# Patient Record
Sex: FEMALE | Race: WHITE | NOT HISPANIC OR LATINO | Employment: OTHER | URBAN - METROPOLITAN AREA
[De-identification: names, ages, dates, MRNs, and addresses within clinical notes are randomized per-mention and may not be internally consistent; named-entity substitution may affect disease eponyms.]

---

## 2023-04-25 ENCOUNTER — CLINICAL SUPPORT (OUTPATIENT)
Dept: PRIMARY CARE | Facility: CLINIC | Age: 78
End: 2023-04-25
Payer: MEDICARE

## 2023-04-25 DIAGNOSIS — D51.9 ANEMIA DUE TO VITAMIN B12 DEFICIENCY, UNSPECIFIED B12 DEFICIENCY TYPE: ICD-10-CM

## 2023-04-25 PROCEDURE — 96372 THER/PROPH/DIAG INJ SC/IM: CPT | Performed by: INTERNAL MEDICINE

## 2023-04-25 RX ORDER — CYANOCOBALAMIN 1000 UG/ML
1000 INJECTION, SOLUTION INTRAMUSCULAR; SUBCUTANEOUS ONCE
Status: COMPLETED | OUTPATIENT
Start: 2023-04-25 | End: 2023-04-25

## 2023-04-25 RX ORDER — CYANOCOBALAMIN 1000 UG/ML
1000 INJECTION, SOLUTION INTRAMUSCULAR; SUBCUTANEOUS ONCE
Status: DISCONTINUED | OUTPATIENT
Start: 2023-04-25 | End: 2023-04-25

## 2023-04-25 RX ADMIN — CYANOCOBALAMIN 1000 MCG: 1000 INJECTION, SOLUTION INTRAMUSCULAR; SUBCUTANEOUS at 13:59

## 2023-05-12 DIAGNOSIS — Z00.00 HEALTH CARE MAINTENANCE: ICD-10-CM

## 2023-05-14 DIAGNOSIS — M81.0 OSTEOPOROSIS, UNSPECIFIED OSTEOPOROSIS TYPE, UNSPECIFIED PATHOLOGICAL FRACTURE PRESENCE: Primary | ICD-10-CM

## 2023-05-19 LAB — CALCIUM (MG/DL) IN SER/PLAS: 9.9 MG/DL (ref 8.6–10.6)

## 2023-05-23 DIAGNOSIS — M81.0 OSTEOPOROSIS, UNSPECIFIED OSTEOPOROSIS TYPE, UNSPECIFIED PATHOLOGICAL FRACTURE PRESENCE: ICD-10-CM

## 2023-05-26 DIAGNOSIS — Z00.00 HEALTH CARE MAINTENANCE: Primary | ICD-10-CM

## 2023-05-26 RX ORDER — DENOSUMAB 60 MG/ML
60 INJECTION SUBCUTANEOUS ONCE
Qty: 1 ML | Refills: 0 | Status: SHIPPED | OUTPATIENT
Start: 2023-05-26 | End: 2023-05-26 | Stop reason: SDUPTHER

## 2023-05-26 RX ORDER — DENOSUMAB 60 MG/ML
60 INJECTION SUBCUTANEOUS ONCE
Qty: 1 ML | Refills: 0 | Status: SHIPPED | OUTPATIENT
Start: 2023-05-26 | End: 2023-11-20 | Stop reason: SDUPTHER

## 2023-06-23 ENCOUNTER — OFFICE VISIT (OUTPATIENT)
Dept: PRIMARY CARE | Facility: CLINIC | Age: 78
End: 2023-06-23
Payer: MEDICARE

## 2023-06-23 VITALS — RESPIRATION RATE: 12 BRPM | HEART RATE: 88 BPM

## 2023-06-23 DIAGNOSIS — M79.89 LEG SWELLING: ICD-10-CM

## 2023-06-23 DIAGNOSIS — M23.304 DEGENERATION DISEASE OF MEDIAL MENISCUS OF LEFT KNEE: Primary | ICD-10-CM

## 2023-06-23 DIAGNOSIS — M17.12 PRIMARY OSTEOARTHRITIS OF LEFT KNEE: ICD-10-CM

## 2023-06-23 DIAGNOSIS — Z00.00 HEALTH CARE MAINTENANCE: ICD-10-CM

## 2023-06-23 PROCEDURE — 99213 OFFICE O/P EST LOW 20 MIN: CPT | Performed by: INTERNAL MEDICINE

## 2023-06-23 PROCEDURE — 1159F MED LIST DOCD IN RCRD: CPT | Performed by: INTERNAL MEDICINE

## 2023-06-23 RX ORDER — PREDNISONE 5 MG/1
5 TABLET ORAL 2 TIMES DAILY
Qty: 16 TABLET | Refills: 0 | Status: SHIPPED | OUTPATIENT
Start: 2023-06-23 | End: 2023-07-01

## 2023-06-23 NOTE — PROGRESS NOTES
Subjective   Patient ID: Tricia Cottrell is a 77 y.o. female who presents for No chief complaint on file..    HPI follow-up visit no staff no chest pain no shortness of breath no side effect with medication had tried only a few days of the Medrol Dosepak she had seen osteoarthritis spine physician after her knee was reexamined at orthopedics recall she had had a plain film ultrasound and MRI for some swelling of the left lateral lower leg although the films had shown medial pathology and a Bakers cyst she could walk up to 3 weeks at a time before she began to have some achiness there    Review of Systems    Objective   There were no vitals taken for this visit.    Physical Exam vital signs noted alert and oriented x3 NCAT no JVD chest clear to auscultation and percussion CV regular rate and rhythm S1-S2 without murmur gallop or rub extremities no clubbing cyanosis or edema on the right on the left there is some lateral calf swelling approximately 1 cm larger than the right musculoskeletal knee crepitus medially some joint effusion including posteriorly ankle more T's normal there is no swelling below the lateral and medial malleolus    Assessment/Plan impression left knee DJD medial meniscus and leg swelling  Plan we will begin low-dose prednisone 5 mg 1 p.o. twice daily with food #16 refill 0 and follow-up with traditional orthopedic if no better films were reviewed with her floresita exercise okay range of motion exercise topical liniment cream Tylenol to supplement continue with blood pressure management and medication and recheck as above and with Ortho and for regular visit

## 2023-07-24 ENCOUNTER — CLINICAL SUPPORT (OUTPATIENT)
Dept: PRIMARY CARE | Facility: CLINIC | Age: 78
End: 2023-07-24
Payer: MEDICARE

## 2023-07-24 DIAGNOSIS — E53.8 B12 DEFICIENCY: ICD-10-CM

## 2023-07-24 DIAGNOSIS — E53.8 B12 DEFICIENCY: Primary | ICD-10-CM

## 2023-07-24 PROCEDURE — 96372 THER/PROPH/DIAG INJ SC/IM: CPT | Performed by: INTERNAL MEDICINE

## 2023-07-24 RX ORDER — CYANOCOBALAMIN 1000 UG/ML
1000 INJECTION, SOLUTION INTRAMUSCULAR; SUBCUTANEOUS ONCE
Status: COMPLETED | OUTPATIENT
Start: 2023-07-24 | End: 2023-07-24

## 2023-07-24 RX ADMIN — CYANOCOBALAMIN 1000 MCG: 1000 INJECTION, SOLUTION INTRAMUSCULAR; SUBCUTANEOUS at 13:17

## 2023-07-24 NOTE — PROGRESS NOTES
Patient was in for a B 12 injection only. Patient brought in medication     B12 1000 mcg given     Left deltoid     NDC [id number]   Lot [id number]  Exp 01/2025      Patricia Urena

## 2023-07-25 ENCOUNTER — APPOINTMENT (OUTPATIENT)
Dept: PRIMARY CARE | Facility: CLINIC | Age: 78
End: 2023-07-25
Payer: MEDICARE

## 2023-09-19 ENCOUNTER — TELEPHONE (OUTPATIENT)
Dept: PRIMARY CARE | Facility: CLINIC | Age: 78
End: 2023-09-19
Payer: MEDICARE

## 2023-09-22 DIAGNOSIS — D51.9 ANEMIA DUE TO VITAMIN B12 DEFICIENCY, UNSPECIFIED B12 DEFICIENCY TYPE: Primary | ICD-10-CM

## 2023-09-22 RX ORDER — CYANOCOBALAMIN 1000 UG/ML
INJECTION, SOLUTION INTRAMUSCULAR; SUBCUTANEOUS
Qty: 1 ML | Refills: 3 | Status: SHIPPED | OUTPATIENT
Start: 2023-09-22 | End: 2024-01-26

## 2023-09-22 RX ORDER — CYANOCOBALAMIN 1000 UG/ML
INJECTION, SOLUTION INTRAMUSCULAR; SUBCUTANEOUS
COMMUNITY
Start: 2015-05-20 | End: 2023-09-22 | Stop reason: SDUPTHER

## 2023-10-24 ENCOUNTER — APPOINTMENT (OUTPATIENT)
Dept: PRIMARY CARE | Facility: CLINIC | Age: 78
End: 2023-10-24
Payer: MEDICARE

## 2023-10-31 ENCOUNTER — CLINICAL SUPPORT (OUTPATIENT)
Dept: PRIMARY CARE | Facility: CLINIC | Age: 78
End: 2023-10-31
Payer: MEDICARE

## 2023-10-31 DIAGNOSIS — D51.9 ANEMIA DUE TO VITAMIN B12 DEFICIENCY, UNSPECIFIED B12 DEFICIENCY TYPE: Primary | ICD-10-CM

## 2023-11-05 RX ORDER — CYANOCOBALAMIN 1000 UG/ML
1000 INJECTION, SOLUTION INTRAMUSCULAR; SUBCUTANEOUS ONCE
Status: DISCONTINUED | OUTPATIENT
Start: 2023-11-05 | End: 2024-04-18

## 2023-11-20 ENCOUNTER — LAB (OUTPATIENT)
Dept: LAB | Facility: LAB | Age: 78
End: 2023-11-20
Payer: MEDICARE

## 2023-11-20 DIAGNOSIS — M81.0 AGE-RELATED OSTEOPOROSIS WITHOUT CURRENT PATHOLOGICAL FRACTURE: Primary | ICD-10-CM

## 2023-11-20 DIAGNOSIS — M81.0 OSTEOPOROSIS, UNSPECIFIED OSTEOPOROSIS TYPE, UNSPECIFIED PATHOLOGICAL FRACTURE PRESENCE: Primary | ICD-10-CM

## 2023-11-20 DIAGNOSIS — Z00.00 HEALTH CARE MAINTENANCE: ICD-10-CM

## 2023-11-20 LAB — CALCIUM SERPL-MCNC: 9.3 MG/DL (ref 8.6–10.6)

## 2023-11-20 PROCEDURE — 82310 ASSAY OF CALCIUM: CPT

## 2023-11-20 PROCEDURE — 36415 COLL VENOUS BLD VENIPUNCTURE: CPT

## 2023-11-20 RX ORDER — ALBUTEROL SULFATE 0.83 MG/ML
3 SOLUTION RESPIRATORY (INHALATION) AS NEEDED
Status: CANCELLED | OUTPATIENT
Start: 2023-12-01

## 2023-11-20 RX ORDER — DIPHENHYDRAMINE HYDROCHLORIDE 50 MG/ML
50 INJECTION INTRAMUSCULAR; INTRAVENOUS AS NEEDED
Status: CANCELLED | OUTPATIENT
Start: 2023-12-01

## 2023-11-20 RX ORDER — DENOSUMAB 60 MG/ML
60 INJECTION SUBCUTANEOUS
Qty: 1 ML | Refills: 1 | Status: SHIPPED | OUTPATIENT
Start: 2023-11-20 | End: 2023-11-20 | Stop reason: SDUPTHER

## 2023-11-20 RX ORDER — FAMOTIDINE 10 MG/ML
20 INJECTION INTRAVENOUS ONCE AS NEEDED
Status: CANCELLED | OUTPATIENT
Start: 2023-12-01

## 2023-11-20 RX ORDER — EPINEPHRINE 0.3 MG/.3ML
0.3 INJECTION SUBCUTANEOUS EVERY 5 MIN PRN
Status: CANCELLED | OUTPATIENT
Start: 2023-12-01

## 2023-11-23 RX ORDER — DENOSUMAB 60 MG/ML
60 INJECTION SUBCUTANEOUS
Qty: 1 ML | Refills: 1 | Status: SHIPPED | OUTPATIENT
Start: 2023-11-23

## 2023-12-01 ENCOUNTER — INFUSION (OUTPATIENT)
Dept: INFUSION THERAPY | Facility: CLINIC | Age: 78
End: 2023-12-01
Payer: MEDICARE

## 2023-12-01 VITALS
OXYGEN SATURATION: 97 % | WEIGHT: 138.56 LBS | DIASTOLIC BLOOD PRESSURE: 110 MMHG | BODY MASS INDEX: 23.78 KG/M2 | TEMPERATURE: 96.9 F | RESPIRATION RATE: 16 BRPM | SYSTOLIC BLOOD PRESSURE: 176 MMHG | HEART RATE: 128 BPM

## 2023-12-01 DIAGNOSIS — M81.0 OSTEOPOROSIS, UNSPECIFIED OSTEOPOROSIS TYPE, UNSPECIFIED PATHOLOGICAL FRACTURE PRESENCE: ICD-10-CM

## 2023-12-01 PROCEDURE — 96372 THER/PROPH/DIAG INJ SC/IM: CPT | Performed by: NURSE PRACTITIONER

## 2023-12-01 RX ORDER — ALBUTEROL SULFATE 0.83 MG/ML
3 SOLUTION RESPIRATORY (INHALATION) AS NEEDED
OUTPATIENT
Start: 2024-05-01

## 2023-12-01 RX ORDER — DIPHENHYDRAMINE HYDROCHLORIDE 50 MG/ML
50 INJECTION INTRAMUSCULAR; INTRAVENOUS AS NEEDED
OUTPATIENT
Start: 2024-05-01

## 2023-12-01 RX ORDER — FAMOTIDINE 10 MG/ML
20 INJECTION INTRAVENOUS ONCE AS NEEDED
OUTPATIENT
Start: 2024-05-01

## 2023-12-01 RX ORDER — EPINEPHRINE 0.3 MG/.3ML
0.3 INJECTION SUBCUTANEOUS EVERY 5 MIN PRN
OUTPATIENT
Start: 2024-05-01

## 2023-12-01 ASSESSMENT — ENCOUNTER SYMPTOMS
BLOOD IN STOOL: 0
ARTHRALGIAS: 0
NUMBNESS: 0
APPETITE CHANGE: 0
NAUSEA: 0
SORE THROAT: 0
EYE PROBLEMS: 0
DIARRHEA: 0
FATIGUE: 0
UNEXPECTED WEIGHT CHANGE: 0
FEVER: 0
ABDOMINAL PAIN: 0
LIGHT-HEADEDNESS: 0
VOMITING: 0
CHILLS: 0
CONSTIPATION: 0
LEG SWELLING: 0
PSYCHIATRIC NEGATIVE: 1
COUGH: 0
SHORTNESS OF BREATH: 0
EXTREMITY WEAKNESS: 0
VOICE CHANGE: 0
FREQUENCY: 0
WOUND: 0
HEMATOLOGIC/LYMPHATIC NEGATIVE: 1
HEADACHES: 0
DIZZINESS: 0
WHEEZING: 0
HEMATURIA: 0
MYALGIAS: 0
TROUBLE SWALLOWING: 0
PALPITATIONS: 0
DYSURIA: 0

## 2023-12-01 NOTE — PROGRESS NOTES
[facility]   infusion Clinic Note   Date: 2023   Name: Gertrudis Cottrell  : 1945   MRN: [id number]         Reason for Visit: Injections (Every 6 month Prolia 60mg subcutaneous injection)         Visit Type: INJECTION      Ordered By: Ezequiel Briseno MD      Accompanied by:Self      Diagnosis: Osteoporosis, unspecified osteoporosis type, unspecified pathological fracture presence       Allergies:   Allergies as of 2023    (No Known Allergies)         Current Medications has a current medication list which includes the following prescription(s): calcium carbonate/vitamin d3, cyanocobalamin, and prolia, and the following Facility-Administered Medications: cyanocobalamin and denosumab.       Vitals:  Vitals:    23 0852   BP: (!) 176/110   BP Location: Right arm   Patient Position: Sitting   BP Cuff Size: Adult   Pulse: (!) 128   Resp: 16   Temp: 36.1 °C (96.9 °F)   TempSrc: Temporal   SpO2: 97%   Weight: 62.9 kg (138 lb 9 oz)             Infusion Pre-procedure Checklist:   - Allergies reviewed: yes   - Medications reviewed: yes       - Previous reaction to current treatment: no      Assess patient for the concerns below. Document provider notification as appropriate.  - Active or recent infection with/without current antibiotic use: no  - Recent or planned invasive dental work: no  - Recent or planned surgeries: no  - Recently received or plans to receive vaccinations: yes Planning on receiving the RSV  - Has treatment related toxicities: no  - Is pregnant:  no      Pain: 0   - Is the pain different from normal: n/a   - Is the pain tolerable: n/a   - Is your Doctor aware:  n/a      Labs: 2023 Calcium 9.3           Fall Risk Screening: Carson Fall Risk  History of Falling, Immediate or Within 3 Months: No  Ambulatory Aid: Walks without aid/bedrest/nurse assist  Intravenous Therapy/Heparin Lock: No  Gait/Transferring: Normal/bedrest/immobile  Mental Status:  Oriented to own ability       Review Of Systems:  Review of Systems   Constitutional:  Negative for appetite change, chills, fatigue, fever and unexpected weight change.   HENT:   Negative for hearing loss, mouth sores, sore throat, tinnitus, trouble swallowing and voice change.    Eyes:  Negative for eye problems.        Glasses for reading   Respiratory:  Negative for cough, shortness of breath and wheezing.    Cardiovascular:  Negative for chest pain, leg swelling and palpitations.   Gastrointestinal:  Negative for abdominal pain, blood in stool, constipation, diarrhea, nausea and vomiting.   Genitourinary:  Negative for dysuria, frequency and hematuria.    Musculoskeletal:  Negative for arthralgias and myalgias.   Skin:  Negative for itching, rash and wound.   Neurological:  Negative for dizziness, extremity weakness, headaches, light-headedness and numbness.   Hematological: Negative.    Psychiatric/Behavioral: Negative.           Infusion Readiness:   - Assessment Concerns Related to Infusion: No  - Provider notified: n/a      Document Below Only If Indicated:   New Patient Education:    N/A (returning patient for continuation of therapy. Ongoing education provided as needed.)        Treatment Conditions & Drug Specific Questions:    Denosumab  (PROLIA. XGEVA)    (Unless otherwise specified on patient specific therapy plan):     TREATMENT CONDITIONS:  Unless otherwise specified on patient specific therapy plan HOLD and notify provider prior to proceeding with today's injection if patients:  o Calcium is LESS THAN 8.6 mg/dL OR  Ionized Calcium LESS THAN 1.1 mmol/L  o Recent or planned invasive dental procedure (within 4 weeks)    Lab Results   Component Value Date    CALCIUM 9.3 11/20/2023        Labs reviewed and patient meets treatment conditions? Yes    DRUG SPECIFIC QUESTIONS:  Is the patient taking calcium and vitamin D? Yes  (Recommended)    Pt Instructed on following risks: (1) hypocalcemia, (2)  osteonecrosis of the jaw, (3) atypical femoral fractures, (4) serious infections, and (5) dermatologic reactions?  Yes      REMINDER:  REMS DRUG    Recommended Vitals/Observation:  Vitals: Obtain vitals prior to injection.  Observation: Patient may leave immediately following injection.        Weight Based Drug Calculations:    WEIGHT BASED DRUGS: NOT APPLICABLE / FLAT DOSE          Initiated By: Maria Dolores Tillman LPN   Time: 9:32 AM     We administered denosumab.

## 2023-12-01 NOTE — PATIENT INSTRUCTIONS
Today :We administered denosumab. (Prolia)   Blood Calcium within 28 days of next visit    For:   1. Osteoporosis, unspecified osteoporosis type, unspecified pathological fracture presence         Your next appointment is due in:  6 months      Please read the  Medication Guide that was given to you.     (Tell all doctors including dentists that you are taking this medication)     Go to the emergency room or call 911 if:  -You have signs of allergic reaction:   -Rash, hives, itching.   -Swollen, blistered, peeling skin.   -Swelling of face, lips, mouth, tongue or throat.   -Tightness of chest, trouble breathing, swallowing or talking     Call your doctor:  - If injection site gets red, warm, swollen, itchy or leaks fluid or pus.     (Leave band aid on your injection site for at least 2 hours and keep area clean and dry  - If you get sick or have symptoms of infection or are not feeling well for any reason.    (Wash your hands often, stay away from people who are sick)  - If you have side effects from your medication that do not go away or are bothersome.     (Refer to the teaching your nurse gave you for side effects to call your doctor about)    - Common side effects may include:  stuffy nose, headache, feeling tired, muscle aches, upset stomach  - Before receiving any vaccines     - Call the [facility] at   If:  - You get sick, are on antibiotics, have had a recent vaccine, have surgery or dental work and your doctor wants your visit rescheduled.  - You need to cancel and reschedule your visit for any reason. Call at least 2 days before your visit if you need to cancel.   - Your insurance changes before your next visit.    (We will need to get approval from your new insurance. This can take up to two weeks.)     The [facility] is opened Monday thru Friday. We are closed on weekends and holidays.   Voice mail will take your call if the center is closed. If you leave a message  please allow 24 hours for a call back during weekdays. If you leave a message on a weekend/holiday, we will call you back the next business day.

## 2024-01-04 DIAGNOSIS — Z00.00 HEALTH CARE MAINTENANCE: Primary | ICD-10-CM

## 2024-01-04 RX ORDER — ATORVASTATIN CALCIUM 10 MG/1
10 TABLET, FILM COATED ORAL NIGHTLY
Qty: 90 TABLET | Refills: 0 | Status: SHIPPED | OUTPATIENT
Start: 2024-01-04 | End: 2024-04-04

## 2024-01-04 RX ORDER — ATORVASTATIN CALCIUM 10 MG/1
1 TABLET, FILM COATED ORAL NIGHTLY
COMMUNITY
Start: 2014-03-09 | End: 2024-01-04 | Stop reason: SDUPTHER

## 2024-01-04 RX ORDER — LOSARTAN POTASSIUM 25 MG/1
25 TABLET ORAL DAILY
Qty: 90 TABLET | Refills: 0 | Status: SHIPPED | OUTPATIENT
Start: 2024-01-04 | End: 2024-04-04

## 2024-01-04 RX ORDER — LEVOTHYROXINE SODIUM 100 UG/1
100 TABLET ORAL
Qty: 90 TABLET | Refills: 0 | Status: SHIPPED | OUTPATIENT
Start: 2024-01-04 | End: 2024-04-04

## 2024-01-04 RX ORDER — LOSARTAN POTASSIUM 25 MG/1
1 TABLET ORAL DAILY
COMMUNITY
Start: 2014-05-10 | End: 2024-01-04 | Stop reason: SDUPTHER

## 2024-01-04 RX ORDER — LEVOTHYROXINE SODIUM 100 UG/1
100 TABLET ORAL
COMMUNITY
Start: 2014-04-11 | End: 2024-01-04 | Stop reason: SDUPTHER

## 2024-01-26 DIAGNOSIS — D51.9 ANEMIA DUE TO VITAMIN B12 DEFICIENCY, UNSPECIFIED B12 DEFICIENCY TYPE: ICD-10-CM

## 2024-01-26 RX ORDER — CYANOCOBALAMIN 1000 UG/ML
INJECTION, SOLUTION INTRAMUSCULAR; SUBCUTANEOUS
Qty: 3 ML | Refills: 1 | Status: SHIPPED | OUTPATIENT
Start: 2024-01-26

## 2024-01-31 ENCOUNTER — APPOINTMENT (OUTPATIENT)
Dept: PRIMARY CARE | Facility: CLINIC | Age: 79
End: 2024-01-31
Payer: MEDICARE

## 2024-02-02 ENCOUNTER — APPOINTMENT (OUTPATIENT)
Dept: PRIMARY CARE | Facility: CLINIC | Age: 79
End: 2024-02-02
Payer: MEDICARE

## 2024-02-05 ENCOUNTER — TELEPHONE (OUTPATIENT)
Dept: OBSTETRICS AND GYNECOLOGY | Facility: CLINIC | Age: 79
End: 2024-02-05
Payer: MEDICARE

## 2024-02-05 DIAGNOSIS — Z12.31 ENCOUNTER FOR SCREENING MAMMOGRAM FOR BREAST CANCER: Primary | ICD-10-CM

## 2024-02-07 ENCOUNTER — OFFICE VISIT (OUTPATIENT)
Dept: PRIMARY CARE | Facility: CLINIC | Age: 79
End: 2024-02-07
Payer: MEDICARE

## 2024-02-07 DIAGNOSIS — E53.8 B12 DEFICIENCY: Primary | ICD-10-CM

## 2024-02-07 PROCEDURE — 1126F AMNT PAIN NOTED NONE PRSNT: CPT | Performed by: INTERNAL MEDICINE

## 2024-02-07 NOTE — PROGRESS NOTES
Pt is visible in the milieu  Flat, depressed  Unable to identify goal for today, as he "just woke up"  Tending ADLs  Appetite is good  Support and reassurance provided, staff availability reinforced  Pt depression is unrated, mild anxiety noted, denies hallucinations/homicidal and suicidal ideations  Agreed to informed staff should he begin to experience suicidal ideations  Denies pain  Subjective   Patient ID: Tricia Cottrell is a 78 y.o. female who presents for No chief complaint on file..    For vitamin B12 injection  HPI and the right deltoid area  Procedure area cleansed and prepped and 1 cc of the cyanocobalamin medication injected without incident bandage applied    Lot number [id number] for a be 2 1   expiration date 6/20/2025    Review of Systems    Objective   There were no vitals taken for this visit.    Physical Exam    Assessment/Plan impression vitamin B12 injection  Plan will check him for her CPE and blood work next month as previously scheduled follow-up for next vitamin B12 injection as arranged

## 2024-03-06 ENCOUNTER — OFFICE VISIT (OUTPATIENT)
Dept: PRIMARY CARE | Facility: CLINIC | Age: 79
End: 2024-03-06
Payer: MEDICARE

## 2024-03-06 ENCOUNTER — LAB (OUTPATIENT)
Dept: LAB | Facility: LAB | Age: 79
End: 2024-03-06
Payer: MEDICARE

## 2024-03-06 VITALS — WEIGHT: 138 LBS | BODY MASS INDEX: 23.69 KG/M2 | DIASTOLIC BLOOD PRESSURE: 77 MMHG | SYSTOLIC BLOOD PRESSURE: 133 MMHG

## 2024-03-06 DIAGNOSIS — D51.9 ANEMIA DUE TO VITAMIN B12 DEFICIENCY, UNSPECIFIED B12 DEFICIENCY TYPE: ICD-10-CM

## 2024-03-06 DIAGNOSIS — Z00.00 HEALTH CARE MAINTENANCE: Primary | ICD-10-CM

## 2024-03-06 DIAGNOSIS — E03.9 HYPOTHYROIDISM, UNSPECIFIED TYPE: ICD-10-CM

## 2024-03-06 DIAGNOSIS — M81.0 OSTEOPOROSIS, UNSPECIFIED OSTEOPOROSIS TYPE, UNSPECIFIED PATHOLOGICAL FRACTURE PRESENCE: Chronic | ICD-10-CM

## 2024-03-06 DIAGNOSIS — E78.2 MIXED HYPERLIPIDEMIA: ICD-10-CM

## 2024-03-06 DIAGNOSIS — Z12.11 ENCOUNTER FOR SCREENING FOR MALIGNANT NEOPLASM OF COLON: ICD-10-CM

## 2024-03-06 LAB
25(OH)D3 SERPL-MCNC: 30 NG/ML (ref 30–100)
ALBUMIN SERPL BCP-MCNC: 4 G/DL (ref 3.4–5)
ALP SERPL-CCNC: 74 U/L (ref 33–136)
ALT SERPL W P-5'-P-CCNC: 18 U/L (ref 7–45)
ANION GAP SERPL CALC-SCNC: 12 MMOL/L (ref 10–20)
AST SERPL W P-5'-P-CCNC: 23 U/L (ref 9–39)
BILIRUB SERPL-MCNC: 0.5 MG/DL (ref 0–1.2)
BUN SERPL-MCNC: 14 MG/DL (ref 6–23)
CALCIUM SERPL-MCNC: 9.6 MG/DL (ref 8.6–10.6)
CHLORIDE SERPL-SCNC: 104 MMOL/L (ref 98–107)
CHOLEST SERPL-MCNC: 165 MG/DL (ref 0–199)
CHOLESTEROL/HDL RATIO: 3.7
CO2 SERPL-SCNC: 27 MMOL/L (ref 21–32)
CREAT SERPL-MCNC: 0.77 MG/DL (ref 0.5–1.05)
EGFRCR SERPLBLD CKD-EPI 2021: 79 ML/MIN/1.73M*2
ERYTHROCYTE [DISTWIDTH] IN BLOOD BY AUTOMATED COUNT: 13.4 % (ref 11.5–14.5)
GLUCOSE SERPL-MCNC: 103 MG/DL (ref 74–99)
HCT VFR BLD AUTO: 36.8 % (ref 36–46)
HDLC SERPL-MCNC: 44.3 MG/DL
HGB BLD-MCNC: 11.8 G/DL (ref 12–16)
LDLC SERPL CALC-MCNC: 77 MG/DL
MCH RBC QN AUTO: 28.1 PG (ref 26–34)
MCHC RBC AUTO-ENTMCNC: 32.1 G/DL (ref 32–36)
MCV RBC AUTO: 88 FL (ref 80–100)
NON HDL CHOLESTEROL: 121 MG/DL (ref 0–149)
NRBC BLD-RTO: 0 /100 WBCS (ref 0–0)
PLATELET # BLD AUTO: 188 X10*3/UL (ref 150–450)
POTASSIUM SERPL-SCNC: 4.6 MMOL/L (ref 3.5–5.3)
PROT SERPL-MCNC: 7.1 G/DL (ref 6.4–8.2)
RBC # BLD AUTO: 4.2 X10*6/UL (ref 4–5.2)
SODIUM SERPL-SCNC: 138 MMOL/L (ref 136–145)
T4 FREE SERPL-MCNC: 1.24 NG/DL (ref 0.78–1.48)
TRIGL SERPL-MCNC: 218 MG/DL (ref 0–149)
TSH SERPL-ACNC: 0.16 MIU/L (ref 0.44–3.98)
VIT B12 SERPL-MCNC: 211 PG/ML (ref 211–911)
VLDL: 44 MG/DL (ref 0–40)
WBC # BLD AUTO: 5.8 X10*3/UL (ref 4.4–11.3)

## 2024-03-06 PROCEDURE — 1160F RVW MEDS BY RX/DR IN RCRD: CPT | Performed by: INTERNAL MEDICINE

## 2024-03-06 PROCEDURE — 80053 COMPREHEN METABOLIC PANEL: CPT

## 2024-03-06 PROCEDURE — 80061 LIPID PANEL: CPT

## 2024-03-06 PROCEDURE — 84439 ASSAY OF FREE THYROXINE: CPT

## 2024-03-06 PROCEDURE — 82306 VITAMIN D 25 HYDROXY: CPT

## 2024-03-06 PROCEDURE — 82607 VITAMIN B-12: CPT

## 2024-03-06 PROCEDURE — 84443 ASSAY THYROID STIM HORMONE: CPT

## 2024-03-06 PROCEDURE — G0439 PPPS, SUBSEQ VISIT: HCPCS | Performed by: INTERNAL MEDICINE

## 2024-03-06 PROCEDURE — 99214 OFFICE O/P EST MOD 30 MIN: CPT | Performed by: INTERNAL MEDICINE

## 2024-03-06 PROCEDURE — 1036F TOBACCO NON-USER: CPT | Performed by: INTERNAL MEDICINE

## 2024-03-06 PROCEDURE — 1126F AMNT PAIN NOTED NONE PRSNT: CPT | Performed by: INTERNAL MEDICINE

## 2024-03-06 PROCEDURE — 1159F MED LIST DOCD IN RCRD: CPT | Performed by: INTERNAL MEDICINE

## 2024-03-06 PROCEDURE — 85027 COMPLETE CBC AUTOMATED: CPT

## 2024-03-06 PROCEDURE — 36415 COLL VENOUS BLD VENIPUNCTURE: CPT

## 2024-03-06 PROCEDURE — 1170F FXNL STATUS ASSESSED: CPT | Performed by: INTERNAL MEDICINE

## 2024-03-06 PROCEDURE — 93000 ELECTROCARDIOGRAM COMPLETE: CPT | Performed by: INTERNAL MEDICINE

## 2024-03-06 NOTE — PROGRESS NOTES
Subjective   Patient ID: Tricia Cottrell is a 78 y.o. female who presents for No chief complaint on file..    HPI  Exam CPE see updated front sheet no chest pain no shortness of breath no side effect with medication occasional left knee there for about has not needed cortisone in quite some time has been using a very low B12 injections bowels normal no dysuria    Past medical history osteoporosis    Medications noted and unchanged    Allergies noted and unchanged    Social history no tobacco no    Family history noted and unchanged    Prevention exercises regularly plan COVID vaccination booster she is okay with Cologuard instead of the colonoscopy    Depression screen not depressed    Review of Systems    Objective   There were no vitals taken for this visit.    Physical Exam vital signs noted alert and oriented x 3 NCAT PERRLA EOMI nares without discharge OP benign TM normal bilateral EAC clear bilateral no AC nodes no JVD.  Thyromegaly chest clear to auscultations regular rate and rhythm S1-S2 abdomen soft nontender normal active bowel sounds LS spine normal curvature negative straight leg raise negative logroll negative SI joint extremities no clubbing cyanosis pulses DTR 2+ upper extremity musculoskeletal left knee full range of motion without much in the way of DJD changes some small posterior effusion    Assessment/Plan impression General medical examination anemia vitamin B12 DJD knee hyperlipidemia osteoporosis hypothyroid  Right  Plan check EKG advised on heart she is had blood work for Prolia including chemistry panel to check on glucose potassium function as well as calcium check with the level has had her mammogram okay for Cologuard requisition made check lipid panel advised on cholesterol profile check TSH  Thyroid check CBC advised on blood counts good diet regular exercise increase water consumption Tylenol as needed for the knee follow-up with orthopedics when needed recheck based on above 2  months for vitamin B12 shot and 3 months for her blood pressure TT 60 cc 31

## 2024-03-11 ASSESSMENT — ENCOUNTER SYMPTOMS
LOSS OF SENSATION IN FEET: 0
DEPRESSION: 0
OCCASIONAL FEELINGS OF UNSTEADINESS: 0

## 2024-03-11 ASSESSMENT — ACTIVITIES OF DAILY LIVING (ADL)
MANAGING_FINANCES: INDEPENDENT
GROCERY_SHOPPING: INDEPENDENT
DRESSING: INDEPENDENT
BATHING: INDEPENDENT
DOING_HOUSEWORK: INDEPENDENT
TAKING_MEDICATION: INDEPENDENT

## 2024-03-11 ASSESSMENT — PATIENT HEALTH QUESTIONNAIRE - PHQ9
2. FEELING DOWN, DEPRESSED OR HOPELESS: NOT AT ALL
1. LITTLE INTEREST OR PLEASURE IN DOING THINGS: NOT AT ALL
SUM OF ALL RESPONSES TO PHQ9 QUESTIONS 1 AND 2: 0

## 2024-03-14 ENCOUNTER — APPOINTMENT (OUTPATIENT)
Dept: RADIOLOGY | Facility: CLINIC | Age: 79
End: 2024-03-14
Payer: COMMERCIAL

## 2024-03-15 ENCOUNTER — HOSPITAL ENCOUNTER (OUTPATIENT)
Dept: RADIOLOGY | Facility: CLINIC | Age: 79
Discharge: HOME | End: 2024-03-15
Payer: MEDICARE

## 2024-03-15 VITALS — BODY MASS INDEX: 23.56 KG/M2 | HEIGHT: 64 IN | WEIGHT: 138 LBS

## 2024-03-15 DIAGNOSIS — Z12.31 ENCOUNTER FOR SCREENING MAMMOGRAM FOR BREAST CANCER: ICD-10-CM

## 2024-03-15 PROCEDURE — 77063 BREAST TOMOSYNTHESIS BI: CPT

## 2024-03-15 PROCEDURE — 77063 BREAST TOMOSYNTHESIS BI: CPT | Performed by: RADIOLOGY

## 2024-03-15 PROCEDURE — 77067 SCR MAMMO BI INCL CAD: CPT | Performed by: RADIOLOGY

## 2024-03-18 DIAGNOSIS — R92.8 ABNORMALITY OF RIGHT BREAST ON SCREENING MAMMOGRAM: Primary | ICD-10-CM

## 2024-03-20 ENCOUNTER — HOSPITAL ENCOUNTER (OUTPATIENT)
Dept: RADIOLOGY | Facility: CLINIC | Age: 79
Discharge: HOME | End: 2024-03-20
Payer: MEDICARE

## 2024-03-20 DIAGNOSIS — R92.8 ABNORMALITY OF RIGHT BREAST ON SCREENING MAMMOGRAM: ICD-10-CM

## 2024-03-20 LAB — NONINV COLON CA DNA+OCC BLD SCRN STL QL: POSITIVE

## 2024-03-20 PROCEDURE — 76642 ULTRASOUND BREAST LIMITED: CPT | Mod: RT

## 2024-03-20 PROCEDURE — 77061 BREAST TOMOSYNTHESIS UNI: CPT | Mod: RT

## 2024-03-20 PROCEDURE — 77065 DX MAMMO INCL CAD UNI: CPT | Mod: RIGHT SIDE | Performed by: RADIOLOGY

## 2024-03-20 PROCEDURE — G0279 TOMOSYNTHESIS, MAMMO: HCPCS | Mod: RIGHT SIDE | Performed by: RADIOLOGY

## 2024-03-20 PROCEDURE — 76981 USE PARENCHYMA: CPT | Mod: RT

## 2024-03-20 PROCEDURE — 76642 ULTRASOUND BREAST LIMITED: CPT | Mod: RIGHT SIDE | Performed by: RADIOLOGY

## 2024-04-04 DIAGNOSIS — Z00.00 HEALTH CARE MAINTENANCE: ICD-10-CM

## 2024-04-04 RX ORDER — LEVOTHYROXINE SODIUM 100 UG/1
100 TABLET ORAL
Qty: 90 TABLET | Refills: 0 | Status: SHIPPED | OUTPATIENT
Start: 2024-04-04

## 2024-04-04 RX ORDER — ATORVASTATIN CALCIUM 10 MG/1
10 TABLET, FILM COATED ORAL NIGHTLY
Qty: 90 TABLET | Refills: 0 | Status: SHIPPED | OUTPATIENT
Start: 2024-04-04

## 2024-04-04 RX ORDER — LOSARTAN POTASSIUM 25 MG/1
25 TABLET ORAL DAILY
Qty: 90 TABLET | Refills: 0 | Status: SHIPPED | OUTPATIENT
Start: 2024-04-04

## 2024-04-12 ENCOUNTER — OFFICE VISIT (OUTPATIENT)
Dept: PRIMARY CARE | Facility: CLINIC | Age: 79
End: 2024-04-12
Payer: MEDICARE

## 2024-04-12 VITALS — SYSTOLIC BLOOD PRESSURE: 139 MMHG | DIASTOLIC BLOOD PRESSURE: 76 MMHG

## 2024-04-12 DIAGNOSIS — D51.9 ANEMIA DUE TO VITAMIN B12 DEFICIENCY, UNSPECIFIED B12 DEFICIENCY TYPE: ICD-10-CM

## 2024-04-12 DIAGNOSIS — Z12.11 ENCOUNTER FOR SCREENING FOR MALIGNANT NEOPLASM OF COLON: Primary | ICD-10-CM

## 2024-04-12 DIAGNOSIS — R19.5 POSITIVE COLORECTAL CANCER SCREENING USING COLOGUARD TEST: ICD-10-CM

## 2024-04-12 DIAGNOSIS — K21.9 GASTROESOPHAGEAL REFLUX DISEASE WITHOUT ESOPHAGITIS: ICD-10-CM

## 2024-04-12 PROCEDURE — 1160F RVW MEDS BY RX/DR IN RCRD: CPT | Performed by: INTERNAL MEDICINE

## 2024-04-12 PROCEDURE — 1159F MED LIST DOCD IN RCRD: CPT | Performed by: INTERNAL MEDICINE

## 2024-04-12 PROCEDURE — 99213 OFFICE O/P EST LOW 20 MIN: CPT | Performed by: INTERNAL MEDICINE

## 2024-04-12 NOTE — PROGRESS NOTES
Subjective   Patient ID: Tricia Cottrell is a 78 y.o. female who presents for No chief complaint on file..    HPI Follow-up visit no chest pain no shortness of breath discussed with recent Cologuard that was positive recent blood count with slightly lower values and that and vitamin B12 she has had colonoscopy and EGD in the past recently has had some upset to her stomach stools appear to be normal    Review of Systems    Objective   There were no vitals taken for this visit.    Physical Exam vital signs noted alert and oriented x 3 NCAT not much in the way of pallor no icterus no jaundice no JVD chest clear to auscultation CV regular rate and rhythm S1-S2 abdomen soft nontender normal active bowel sounds extremities no clubbing cyanosis or edema normal distal pulses    Assessment/Plan    impression p anemia GERD positive Cologuard  Plan increase frequency of vitamin B12 injections to monthly scheduled for EGD and colonoscopy requisition made continue to monitor blood pressure good diet regular exercise increase waterconsumption avoid nsaid ppi +/- if having endoscopy soon and recheck based on above and for next vitamin B12 injection

## 2024-04-15 DIAGNOSIS — Z12.11 SPECIAL SCREENING FOR MALIGNANT NEOPLASMS, COLON: ICD-10-CM

## 2024-04-15 RX ORDER — POLYETHYLENE GLYCOL 3350, SODIUM SULFATE ANHYDROUS, SODIUM BICARBONATE, SODIUM CHLORIDE, POTASSIUM CHLORIDE 236; 22.74; 6.74; 5.86; 2.97 G/4L; G/4L; G/4L; G/4L; G/4L
POWDER, FOR SOLUTION ORAL
Qty: 4000 ML | Refills: 0 | Status: SHIPPED | OUTPATIENT
Start: 2024-04-15 | End: 2024-05-30 | Stop reason: HOSPADM

## 2024-04-18 NOTE — PROGRESS NOTES
Patient did receive B12 today but we are not able to document due to not having LOT/NDC numbers or expiration date.

## 2024-04-19 ENCOUNTER — OFFICE VISIT (OUTPATIENT)
Dept: PRIMARY CARE | Facility: CLINIC | Age: 79
End: 2024-04-19
Payer: MEDICARE

## 2024-04-19 DIAGNOSIS — D51.9 ANEMIA DUE TO VITAMIN B12 DEFICIENCY, UNSPECIFIED B12 DEFICIENCY TYPE: Primary | ICD-10-CM

## 2024-04-19 PROCEDURE — 1159F MED LIST DOCD IN RCRD: CPT | Performed by: INTERNAL MEDICINE

## 2024-04-19 PROCEDURE — 96372 THER/PROPH/DIAG INJ SC/IM: CPT | Performed by: INTERNAL MEDICINE

## 2024-04-19 PROCEDURE — 1160F RVW MEDS BY RX/DR IN RCRD: CPT | Performed by: INTERNAL MEDICINE

## 2024-04-19 RX ORDER — CYANOCOBALAMIN 1000 UG/ML
1000 INJECTION, SOLUTION INTRAMUSCULAR; SUBCUTANEOUS ONCE
Status: COMPLETED | OUTPATIENT
Start: 2024-04-19 | End: 2024-04-19

## 2024-04-19 RX ADMIN — CYANOCOBALAMIN 1000 MCG: 1000 INJECTION, SOLUTION INTRAMUSCULAR; SUBCUTANEOUS at 14:05

## 2024-05-07 ENCOUNTER — APPOINTMENT (OUTPATIENT)
Dept: PRIMARY CARE | Facility: CLINIC | Age: 79
End: 2024-05-07
Payer: MEDICARE

## 2024-05-09 ENCOUNTER — OFFICE VISIT (OUTPATIENT)
Dept: GASTROENTEROLOGY | Facility: EXTERNAL LOCATION | Age: 79
End: 2024-05-09
Payer: MEDICARE

## 2024-05-09 DIAGNOSIS — K57.30 DIVERTICULOSIS OF LARGE INTESTINE WITHOUT DIVERTICULITIS: ICD-10-CM

## 2024-05-09 DIAGNOSIS — K29.70 GASTRITIS WITHOUT BLEEDING, UNSPECIFIED CHRONICITY, UNSPECIFIED GASTRITIS TYPE: ICD-10-CM

## 2024-05-09 DIAGNOSIS — K21.9 GASTROESOPHAGEAL REFLUX DISEASE WITHOUT ESOPHAGITIS: ICD-10-CM

## 2024-05-09 DIAGNOSIS — Z12.11 ENCOUNTER FOR SCREENING FOR MALIGNANT NEOPLASM OF COLON: ICD-10-CM

## 2024-05-09 DIAGNOSIS — D12.5 BENIGN NEOPLASM OF SIGMOID COLON: ICD-10-CM

## 2024-05-09 DIAGNOSIS — R19.5 POSITIVE COLORECTAL CANCER SCREENING USING COLOGUARD TEST: Primary | ICD-10-CM

## 2024-05-09 DIAGNOSIS — R10.13 ABDOMINAL PAIN, EPIGASTRIC: ICD-10-CM

## 2024-05-09 DIAGNOSIS — D12.0 BENIGN NEOPLASM OF CECUM: ICD-10-CM

## 2024-05-09 DIAGNOSIS — K64.9 HEMORRHOIDS WITHOUT COMPLICATION: ICD-10-CM

## 2024-05-09 DIAGNOSIS — D12.2 BENIGN NEOPLASM OF ASCENDING COLON: ICD-10-CM

## 2024-05-09 DIAGNOSIS — K44.9 HIATAL HERNIA: ICD-10-CM

## 2024-05-09 PROCEDURE — 88341 IMHCHEM/IMCYTCHM EA ADD ANTB: CPT | Performed by: PATHOLOGY

## 2024-05-09 PROCEDURE — 88342 IMHCHEM/IMCYTCHM 1ST ANTB: CPT | Performed by: PATHOLOGY

## 2024-05-09 PROCEDURE — 0753T DGTZ GLS MCRSCP SLD LEVEL IV: CPT

## 2024-05-09 PROCEDURE — 43239 EGD BIOPSY SINGLE/MULTIPLE: CPT | Performed by: INTERNAL MEDICINE

## 2024-05-09 PROCEDURE — 0760T DGTZ GLS MCRSCP SL IMM 1ST: CPT

## 2024-05-09 PROCEDURE — 88342 IMHCHEM/IMCYTCHM 1ST ANTB: CPT

## 2024-05-09 PROCEDURE — 88305 TISSUE EXAM BY PATHOLOGIST: CPT | Performed by: PATHOLOGY

## 2024-05-09 PROCEDURE — 45385 COLONOSCOPY W/LESION REMOVAL: CPT | Performed by: INTERNAL MEDICINE

## 2024-05-09 PROCEDURE — 1160F RVW MEDS BY RX/DR IN RCRD: CPT | Performed by: INTERNAL MEDICINE

## 2024-05-09 PROCEDURE — 1159F MED LIST DOCD IN RCRD: CPT | Performed by: INTERNAL MEDICINE

## 2024-05-09 PROCEDURE — 88305 TISSUE EXAM BY PATHOLOGIST: CPT

## 2024-05-09 PROCEDURE — 88341 IMHCHEM/IMCYTCHM EA ADD ANTB: CPT

## 2024-05-09 NOTE — PROGRESS NOTES
EGD/colonoscopy performed today 5/9/2024 at the [facility] ([facility]).  See procedure report(s) under Media tab.

## 2024-05-10 ENCOUNTER — LAB REQUISITION (OUTPATIENT)
Dept: LAB | Facility: HOSPITAL | Age: 79
End: 2024-05-10
Payer: COMMERCIAL

## 2024-05-17 ENCOUNTER — TELEPHONE (OUTPATIENT)
Dept: PRIMARY CARE | Facility: CLINIC | Age: 79
End: 2024-05-17

## 2024-05-17 DIAGNOSIS — D51.9 ANEMIA DUE TO VITAMIN B12 DEFICIENCY, UNSPECIFIED B12 DEFICIENCY TYPE: Primary | ICD-10-CM

## 2024-05-17 DIAGNOSIS — S42.409K: Primary | ICD-10-CM

## 2024-05-20 ENCOUNTER — OFFICE VISIT (OUTPATIENT)
Dept: ORTHOPEDIC SURGERY | Facility: HOSPITAL | Age: 79
End: 2024-05-20
Payer: MEDICARE

## 2024-05-20 VITALS — BODY MASS INDEX: 23.9 KG/M2 | HEIGHT: 64 IN | WEIGHT: 140 LBS

## 2024-05-20 DIAGNOSIS — S42.412A LEFT SUPRACONDYLAR HUMERUS FRACTURE, CLOSED, INITIAL ENCOUNTER: Primary | ICD-10-CM

## 2024-05-20 PROCEDURE — 1159F MED LIST DOCD IN RCRD: CPT | Performed by: FAMILY MEDICINE

## 2024-05-20 PROCEDURE — 1160F RVW MEDS BY RX/DR IN RCRD: CPT | Performed by: FAMILY MEDICINE

## 2024-05-20 PROCEDURE — 99204 OFFICE O/P NEW MOD 45 MIN: CPT | Performed by: FAMILY MEDICINE

## 2024-05-20 PROCEDURE — 99214 OFFICE O/P EST MOD 30 MIN: CPT | Performed by: FAMILY MEDICINE

## 2024-05-20 PROCEDURE — 1036F TOBACCO NON-USER: CPT | Performed by: FAMILY MEDICINE

## 2024-05-20 PROCEDURE — 1125F AMNT PAIN NOTED PAIN PRSNT: CPT | Performed by: FAMILY MEDICINE

## 2024-05-20 RX ORDER — HYDROCODONE BITARTRATE AND ACETAMINOPHEN 5; 325 MG/1; MG/1
1 TABLET ORAL EVERY 6 HOURS
COMMUNITY
Start: 2024-05-16 | End: 2024-05-21 | Stop reason: ALTCHOICE

## 2024-05-20 RX ORDER — IBUPROFEN 600 MG/1
600 TABLET ORAL EVERY 8 HOURS PRN
COMMUNITY
Start: 2024-05-16 | End: 2024-05-30 | Stop reason: HOSPADM

## 2024-05-20 ASSESSMENT — PAIN SCALES - GENERAL: PAINLEVEL_OUTOF10: 3

## 2024-05-20 ASSESSMENT — PAIN - FUNCTIONAL ASSESSMENT: PAIN_FUNCTIONAL_ASSESSMENT: 0-10

## 2024-05-20 NOTE — PROGRESS NOTES
** Please excuse any errors in grammar or translation related to this dictation. Voice recognition software was utilized to prepare this document. **    Assessment & Plan:  Dx: Left supracondylar elbow fracture with posterior displacement.    Informed patient she will likely need surgical intervention to address this injury.  She has been set up with an appointment to see Dr. Park tomorrow.  Continue to take pain medication (Norco) prescribed by the ER as needed.  Can alternate use of Norco with ibuprofen however she minimize additional acetaminophen use.  Continue wearing splint and sling for comfort and immobilization until visit tomorrow.  All questions answered and patient is agreeable to plan of care.      Chief complaint:  Left elbow fracture    HPI:  78 y.o. female presents the Ortho injury clinic with a complaint of left elbow fracture.  This occurred after she tripped and fell landing on her left elbow on 5/16 while on vacation in [place].  She was evaluated in the ER and informed of a displaced fracture.  She was placed into a posterior arm splint and a sling.  Reports she has sensation and motion in her fingers.    Patient Active Problem List   Diagnosis    Anemia due to vitamin B12 deficiency    Osteoporosis     Past Surgical History:   Procedure Laterality Date    BREAST BIOPSY  1980s    HYSTERECTOMY  1997     Current Outpatient Medications on File Prior to Visit   Medication Sig Dispense Refill    HYDROcodone-acetaminophen (Norco) 5-325 mg tablet Take 1 tablet by mouth every 6 hours.      ibuprofen 600 mg tablet Take 1 tablet (600 mg) by mouth every 8 hours if needed.      atorvastatin (Lipitor) 10 mg tablet TAKE 1 TABLET (10 MG) BY MOUTH ONCE DAILY AT BEDTIME. 90 tablet 0    calcium carbonate/vitamin D3 (CALCIUM + D ORAL) Take by mouth.      cyanocobalamin (Vitamin B-12) 1,000 mcg/mL injection INJECT 1ML IN THE MUSCLE ONCE A MONTH 3 mL 1    denosumab (Prolia) 60 mg/mL syringe Inject 1 mL (60 mg  total) under the skin every 6 months. 1 mL 1    levothyroxine (Synthroid, Levoxyl) 100 mcg tablet TAKE 1 TABLET (100 MCG) BY MOUTH ONCE DAILY IN THE MORNING. TAKE BEFORE MEALS. 90 tablet 0    losartan (Cozaar) 25 mg tablet TAKE 1 TABLET BY MOUTH EVERY DAY 90 tablet 0    polyethylene glycol-electrolytes (Golytely) 420 gram solution Begin drinking first half of prep 6pm the night before procedure. Start second half 5 hours before procedure time. 4000 mL 0     No current facility-administered medications on file prior to visit.       Exam:  Limited exam completed as patient is in posterior arm splint.  Sensation intact to light touch throughout volar surface of her hands and all digits.  2+ ulnar and radial pulses.  Brisk capillary refill.    Results:  Reviewed ER report and xray images patient provided on her iPad.  Posterior displaced supracondylar fracture present.    Lab Results   Component Value Date    HGBA1C 6.1 10/24/2018    CREATININE 0.77 03/06/2024    EGFR 79 03/06/2024

## 2024-05-21 ENCOUNTER — OFFICE VISIT (OUTPATIENT)
Dept: ORTHOPEDIC SURGERY | Facility: CLINIC | Age: 79
End: 2024-05-21
Payer: MEDICARE

## 2024-05-21 ENCOUNTER — HOSPITAL ENCOUNTER (OUTPATIENT)
Dept: RADIOLOGY | Facility: HOSPITAL | Age: 79
Discharge: HOME | End: 2024-05-21
Payer: MEDICARE

## 2024-05-21 ENCOUNTER — APPOINTMENT (OUTPATIENT)
Dept: ORTHOPEDIC SURGERY | Facility: CLINIC | Age: 79
End: 2024-05-21
Payer: MEDICARE

## 2024-05-21 DIAGNOSIS — S42.492A CLOSED BICONDYLAR FRACTURE OF DISTAL HUMERUS, LEFT, INITIAL ENCOUNTER: ICD-10-CM

## 2024-05-21 DIAGNOSIS — S42.492A CLOSED BICONDYLAR FRACTURE OF DISTAL HUMERUS, LEFT, INITIAL ENCOUNTER: Primary | ICD-10-CM

## 2024-05-21 PROCEDURE — 73200 CT UPPER EXTREMITY W/O DYE: CPT | Mod: LEFT SIDE | Performed by: RADIOLOGY

## 2024-05-21 PROCEDURE — 73200 CT UPPER EXTREMITY W/O DYE: CPT | Mod: LT

## 2024-05-21 PROCEDURE — 99214 OFFICE O/P EST MOD 30 MIN: CPT | Mod: 25 | Performed by: STUDENT IN AN ORGANIZED HEALTH CARE EDUCATION/TRAINING PROGRAM

## 2024-05-21 PROCEDURE — 1159F MED LIST DOCD IN RCRD: CPT | Performed by: STUDENT IN AN ORGANIZED HEALTH CARE EDUCATION/TRAINING PROGRAM

## 2024-05-21 PROCEDURE — 1160F RVW MEDS BY RX/DR IN RCRD: CPT | Performed by: STUDENT IN AN ORGANIZED HEALTH CARE EDUCATION/TRAINING PROGRAM

## 2024-05-21 PROCEDURE — 99204 OFFICE O/P NEW MOD 45 MIN: CPT | Performed by: STUDENT IN AN ORGANIZED HEALTH CARE EDUCATION/TRAINING PROGRAM

## 2024-05-21 RX ORDER — HYDROCODONE BITARTRATE AND ACETAMINOPHEN 5; 325 MG/1; MG/1
1 TABLET ORAL EVERY 6 HOURS PRN
Qty: 25 TABLET | Refills: 0 | Status: ON HOLD | OUTPATIENT
Start: 2024-05-21 | End: 2024-05-30

## 2024-05-21 NOTE — PROGRESS NOTES
CHIEF COMPLAINT: No chief complaint on file.    History: 78 y.o. female presents to the office today for evaluation of her left elbow.  Patient is right-hand dominant.  She is quite healthy for her age.  She is still active with her  doing activities and traveling.  Unfortunately, she was in [place] less than a week ago, and fell.  She does state a left distal humerus fracture.  She was placed in a posterior slab splint and they actually traveled back here to [place] for treatment.  She says that actually she is really not having too much pain in the elbow elicit shifts.  Has been relatively comfortable.  Comfortable today in the clinic.  Denies numbness or tingling in the fingers.    Past medical history, past surgical history, medications, allergies, family history, social history, and review of systems were reviewed today.    A 12 point review of systems was negative other than as stated in the HPI.    No past medical history on file.     No Known Allergies     Past Surgical History:   Procedure Laterality Date    BREAST BIOPSY  1980s    HYSTERECTOMY  1997        No family history on file.     Social History     Socioeconomic History    Marital status:      Spouse name: Not on file    Number of children: Not on file    Years of education: Not on file    Highest education level: Not on file   Occupational History    Not on file   Tobacco Use    Smoking status: Never    Smokeless tobacco: Never   Vaping Use    Vaping status: Never Used   Substance and Sexual Activity    Alcohol use: Not Currently    Drug use: Not Currently    Sexual activity: Not on file   Other Topics Concern    Not on file   Social History Narrative    Not on file     Social Determinants of Health     Financial Resource Strain: Not on file   Food Insecurity: Not on file   Transportation Needs: Not on file   Physical Activity: Not on file   Stress: Not on file   Social Connections: Not on file   Intimate Partner Violence: Not on file  "  Housing Stability: Not on file        CURRENT MEDICATIONS:   Current Outpatient Medications   Medication Sig Dispense Refill    atorvastatin (Lipitor) 10 mg tablet TAKE 1 TABLET (10 MG) BY MOUTH ONCE DAILY AT BEDTIME. 90 tablet 0    calcium carbonate/vitamin D3 (CALCIUM + D ORAL) Take by mouth.      cyanocobalamin (Vitamin B-12) 1,000 mcg/mL injection INJECT 1ML IN THE MUSCLE ONCE A MONTH 3 mL 1    denosumab (Prolia) 60 mg/mL syringe Inject 1 mL (60 mg total) under the skin every 6 months. 1 mL 1    HYDROcodone-acetaminophen (Norco) 5-325 mg tablet Take 1 tablet by mouth every 6 hours if needed for severe pain (7 - 10) for up to 7 days. 25 tablet 0    ibuprofen 600 mg tablet Take 1 tablet (600 mg) by mouth every 8 hours if needed.      levothyroxine (Synthroid, Levoxyl) 100 mcg tablet TAKE 1 TABLET (100 MCG) BY MOUTH ONCE DAILY IN THE MORNING. TAKE BEFORE MEALS. 90 tablet 0    losartan (Cozaar) 25 mg tablet TAKE 1 TABLET BY MOUTH EVERY DAY 90 tablet 0    polyethylene glycol-electrolytes (Golytely) 420 gram solution Begin drinking first half of prep 6pm the night before procedure. Start second half 5 hours before procedure time. 4000 mL 0     No current facility-administered medications for this visit.       Physical Examination:      6/1/2023    10:45 AM 6/23/2023     1:10 PM 12/1/2023     8:52 AM 3/6/2024     2:01 PM 3/15/2024    10:04 AM 4/12/2024     2:58 PM 5/20/2024     9:01 AM   Vitals   Systolic 121  176 133  139    Diastolic 75  110 77  76    Heart Rate 80 88 128       Temp 36.6 °C (97.8 °F)  36.1 °C (96.9 °F)       Resp 16 12 16       Height (in)     1.626 m (5' 4\")  1.626 m (5' 4\")   Weight (lb) 140.5  138.56 138 138  140   BMI 24.12 kg/m2  23.78 kg/m2 23.69 kg/m2 23.69 kg/m2  24.03 kg/m2   BSA (m2) 1.7 m2  1.69 m2 1.68 m2 1.68 m2  1.69 m2   Visit Report  Report  Report  Report Report      There is no height or weight on file to calculate BMI.    Well-appearing, appears stated age, pleasant and " cooperative, appropriate mood and behavior. Height and weight reviewed. Alert and oriented x3.  Auditory function intact.  No acute distress.  Intact ocular function, NIRAJ, EOMI. Breathing is unlabored .  There is no evidence of jugular venous distension. Skin appearance is normal without evidence of rash or other lesions. 2+ radial pulses bilaterally, fingers pink and wwp, good capillary refill, no pitting edema. No appreciable lymphadenopathy in bilateral upper extremities. SILT throughout both upper extremities, median/radial/ulnar/musculocutaneous/axillary nerve motor and sensory intact (except for abnormalities noted in focused musculoskeletal exam section below).     Neck exam: Full range of motion of the neck in flexion/extension and rotational movements. No significant areas of tenderness to palpation in the neck.    On exam of bilateral upper extremities, full range of motion of the fingers distally, neurovascular intact.  Range of motion of the shoulder and elbow are deferred secondary to fracture.  Range of motion on the right side in terms of the elbow is rectal motion from 0-1 50.    Imaging: Radiographs of the left elbow reviewed today.  Personally interpreted by myself.  There is an intra-articular left distal humerus fracture.  There does not appear to be at least 1 intra-articular split.  There is some comminution.    Assessment: Left intra-articular distal humerus fracture    Plan: Long discussion with the patient and  about treatment options.  Discussed that there are 3 options in the situation.  The first to be nonoperative management in a sling for a few weeks, then range of motion.  The other option would be surgical which would be either open reduction internal fixation or total elbow.  I talked with him about the restrictions of total elbow and possible complications.  They were not interested in the total elbow option.  The patient is active, like maximize her function.  Therefore, I  do think that surgery is reasonable.  They like to proceed with open reduction internal fixation.  Will get a stat CT scan to further evaluate the bony fragments. I will will likely contact one of our orthopedic trauma colleagues to assist, as they are specialists in complex fractures.  Once the CT scan is completed I have communicated with my colleagues, we will reach out to the patient with a final plan.    Dragon software was used to dictate this note, please be aware that minor errors in transcription may be present.    Santy Park MD    Shoulder/Elbow Surgery  [facility]

## 2024-05-22 ENCOUNTER — PREP FOR PROCEDURE (OUTPATIENT)
Dept: OPERATING ROOM | Facility: HOSPITAL | Age: 79
End: 2024-05-22

## 2024-05-22 ENCOUNTER — OFFICE VISIT (OUTPATIENT)
Dept: PRIMARY CARE | Facility: CLINIC | Age: 79
End: 2024-05-22
Payer: MEDICARE

## 2024-05-22 ENCOUNTER — LAB REQUISITION (OUTPATIENT)
Dept: LAB | Facility: HOSPITAL | Age: 79
End: 2024-05-22
Payer: MEDICARE

## 2024-05-22 ENCOUNTER — LAB (OUTPATIENT)
Dept: LAB | Facility: LAB | Age: 79
End: 2024-05-22
Payer: MEDICARE

## 2024-05-22 ENCOUNTER — OFFICE VISIT (OUTPATIENT)
Dept: ORTHOPEDIC SURGERY | Facility: CLINIC | Age: 79
End: 2024-05-22
Payer: MEDICARE

## 2024-05-22 VITALS — WEIGHT: 140 LBS | HEIGHT: 64 IN | BODY MASS INDEX: 23.9 KG/M2

## 2024-05-22 DIAGNOSIS — S42.411A CLOSED FRACTURE OF SUPRACONDYLAR HUMERUS, RIGHT, INITIAL ENCOUNTER: Primary | ICD-10-CM

## 2024-05-22 DIAGNOSIS — S42.222B: ICD-10-CM

## 2024-05-22 DIAGNOSIS — E53.8 B12 DEFICIENCY: Primary | ICD-10-CM

## 2024-05-22 DIAGNOSIS — Z01.818 ENCOUNTER FOR OTHER PREPROCEDURAL EXAMINATION: ICD-10-CM

## 2024-05-22 DIAGNOSIS — Z01.818 PRE-OP TESTING: ICD-10-CM

## 2024-05-22 DIAGNOSIS — M81.0 OSTEOPOROSIS, UNSPECIFIED OSTEOPOROSIS TYPE, UNSPECIFIED PATHOLOGICAL FRACTURE PRESENCE: Primary | ICD-10-CM

## 2024-05-22 LAB
ABO GROUP (TYPE) IN BLOOD: NORMAL
ANION GAP SERPL CALC-SCNC: 12 MMOL/L (ref 10–20)
ANTIBODY SCREEN: NORMAL
BASOPHILS # BLD AUTO: 0.04 X10*3/UL (ref 0–0.1)
BASOPHILS NFR BLD AUTO: 0.5 %
BUN SERPL-MCNC: 15 MG/DL (ref 6–23)
CALCIUM SERPL-MCNC: 8.8 MG/DL (ref 8.6–10.3)
CHLORIDE SERPL-SCNC: 104 MMOL/L (ref 98–107)
CO2 SERPL-SCNC: 27 MMOL/L (ref 21–32)
CREAT SERPL-MCNC: 0.65 MG/DL (ref 0.5–1.05)
EGFRCR SERPLBLD CKD-EPI 2021: 90 ML/MIN/1.73M*2
EOSINOPHIL # BLD AUTO: 0.27 X10*3/UL (ref 0–0.4)
EOSINOPHIL NFR BLD AUTO: 3.6 %
ERYTHROCYTE [DISTWIDTH] IN BLOOD BY AUTOMATED COUNT: 13.5 % (ref 11.5–14.5)
GLUCOSE SERPL-MCNC: 111 MG/DL (ref 74–99)
HCT VFR BLD AUTO: 36.1 % (ref 36–46)
HGB BLD-MCNC: 11.8 G/DL (ref 12–16)
IMM GRANULOCYTES # BLD AUTO: 0.02 X10*3/UL (ref 0–0.5)
IMM GRANULOCYTES NFR BLD AUTO: 0.3 % (ref 0–0.9)
INR PPP: 1.1 (ref 0.9–1.1)
LYMPHOCYTES # BLD AUTO: 1.2 X10*3/UL (ref 0.8–3)
LYMPHOCYTES NFR BLD AUTO: 16 %
MCH RBC QN AUTO: 28.2 PG (ref 26–34)
MCHC RBC AUTO-ENTMCNC: 32.7 G/DL (ref 32–36)
MCV RBC AUTO: 86 FL (ref 80–100)
MONOCYTES # BLD AUTO: 0.65 X10*3/UL (ref 0.05–0.8)
MONOCYTES NFR BLD AUTO: 8.7 %
NEUTROPHILS # BLD AUTO: 5.33 X10*3/UL (ref 1.6–5.5)
NEUTROPHILS NFR BLD AUTO: 70.9 %
NRBC BLD-RTO: 0 /100 WBCS (ref 0–0)
PLATELET # BLD AUTO: 205 X10*3/UL (ref 150–450)
POTASSIUM SERPL-SCNC: 4.6 MMOL/L (ref 3.5–5.3)
PROTHROMBIN TIME: 11.9 SECONDS (ref 9.8–12.8)
RBC # BLD AUTO: 4.19 X10*6/UL (ref 4–5.2)
RH FACTOR (ANTIGEN D): NORMAL
SODIUM SERPL-SCNC: 138 MMOL/L (ref 136–145)
WBC # BLD AUTO: 7.5 X10*3/UL (ref 4.4–11.3)

## 2024-05-22 PROCEDURE — 86905 BLOOD TYPING RBC ANTIGENS: CPT | Mod: 91

## 2024-05-22 PROCEDURE — 86900 BLOOD TYPING SEROLOGIC ABO: CPT

## 2024-05-22 PROCEDURE — 86901 BLOOD TYPING SEROLOGIC RH(D): CPT

## 2024-05-22 PROCEDURE — 1036F TOBACCO NON-USER: CPT | Performed by: ORTHOPAEDIC SURGERY

## 2024-05-22 PROCEDURE — 85610 PROTHROMBIN TIME: CPT

## 2024-05-22 PROCEDURE — 29105 APPLICATION LONG ARM SPLINT: CPT | Performed by: ORTHOPAEDIC SURGERY

## 2024-05-22 PROCEDURE — 85025 COMPLETE CBC W/AUTO DIFF WBC: CPT

## 2024-05-22 PROCEDURE — 36415 COLL VENOUS BLD VENIPUNCTURE: CPT

## 2024-05-22 PROCEDURE — 86870 RBC ANTIBODY IDENTIFICATION: CPT

## 2024-05-22 PROCEDURE — 1160F RVW MEDS BY RX/DR IN RCRD: CPT | Performed by: ORTHOPAEDIC SURGERY

## 2024-05-22 PROCEDURE — 96372 THER/PROPH/DIAG INJ SC/IM: CPT | Performed by: INTERNAL MEDICINE

## 2024-05-22 PROCEDURE — 86850 RBC ANTIBODY SCREEN: CPT

## 2024-05-22 PROCEDURE — 1160F RVW MEDS BY RX/DR IN RCRD: CPT | Performed by: INTERNAL MEDICINE

## 2024-05-22 PROCEDURE — 86077 PHYS BLOOD BANK SERV XMATCH: CPT | Performed by: ORTHOPAEDIC SURGERY

## 2024-05-22 PROCEDURE — 99215 OFFICE O/P EST HI 40 MIN: CPT | Performed by: ORTHOPAEDIC SURGERY

## 2024-05-22 PROCEDURE — 1159F MED LIST DOCD IN RCRD: CPT | Performed by: ORTHOPAEDIC SURGERY

## 2024-05-22 PROCEDURE — 1159F MED LIST DOCD IN RCRD: CPT | Performed by: INTERNAL MEDICINE

## 2024-05-22 PROCEDURE — 80048 BASIC METABOLIC PNL TOTAL CA: CPT

## 2024-05-22 RX ORDER — CYANOCOBALAMIN 1000 UG/ML
1000 INJECTION, SOLUTION INTRAMUSCULAR; SUBCUTANEOUS ONCE
Status: SHIPPED | OUTPATIENT
Start: 2024-05-22

## 2024-05-22 RX ORDER — CEFAZOLIN SODIUM 2 G/100ML
2 INJECTION, SOLUTION INTRAVENOUS ONCE
Status: CANCELLED | OUTPATIENT
Start: 2024-05-22 | End: 2024-05-22

## 2024-05-22 RX ORDER — CYANOCOBALAMIN 1000 UG/ML
1000 INJECTION, SOLUTION INTRAMUSCULAR; SUBCUTANEOUS ONCE
Status: COMPLETED | OUTPATIENT
Start: 2024-05-22 | End: 2024-05-22

## 2024-05-22 RX ORDER — CYANOCOBALAMIN 1000 UG/ML
1000 INJECTION, SOLUTION INTRAMUSCULAR; SUBCUTANEOUS ONCE
Status: CANCELLED | OUTPATIENT
Start: 2024-05-22 | End: 2024-05-22

## 2024-05-22 RX ORDER — SODIUM CHLORIDE 9 MG/ML
100 INJECTION, SOLUTION INTRAVENOUS CONTINUOUS
Status: CANCELLED | OUTPATIENT
Start: 2024-05-22

## 2024-05-22 RX ADMIN — CYANOCOBALAMIN 1000 MCG: 1000 INJECTION, SOLUTION INTRAMUSCULAR; SUBCUTANEOUS at 13:23

## 2024-05-22 ASSESSMENT — PAIN - FUNCTIONAL ASSESSMENT: PAIN_FUNCTIONAL_ASSESSMENT: NO/DENIES PAIN

## 2024-05-22 NOTE — PROGRESS NOTES
78-year-old female who is 6 days out from a fall onto her left elbow.  She was vacationing in [place] and sustained an intra-articular left distal humerus fracture.  She was treated in a long-arm splint.  Yesterday she saw my partner Dr. Park who referred to me.  She is now here for a preoperative consultation.  She denies numbness or tingling in her fingers.  She is right-hand dominant.  She is quite active.  She is also in a sling.  She has some cramping but no significant pain in her left elbow.  She is otherwise healthy with stable medical comorbidities.    The patients full medical history, surgical history, medications, allergies, family, medical history, social history, and a complete 30 point review of systems is documented in the medical record on the signed, scanned medical intake sheet or reviewed in the history of present illness.    Gen: The patient is alert and oriented ×3, is in no acute distress, and appear their stated age and weight.    Psychiatric: Mood and affect are appropriate.    Eyes: Sclera are white, and pupils are round and symmetric.    ENT: Mucous membranes are moist.     Neck: Supple. Thyroid is midline.    Respiratory: Respirations are nonlabored, chest rise is symmetric.    Cardiac: Rate is regular by palpation of distal pulses.     Abdomen: Nondistended.    Integument: No obvious cutaneous lesions are noted. No signs of lymphangitis. No signs of systemic edema.    Musculoskeletal examination of the left upper extremity demonstrates evolving ecchymosis throughout the arm and hand.  Very small superficial medial abrasion but no lacerations in the skin.  She can actively move her digits and wrist and there is no sensory deficits noted to light touch.  Hand is well-perfused.      Multiple views of her x-rays from outside source were reviewed and demonstrate an intra-articular distal humerus fracture    CT scan confirms that with some intra-articular comminution and comminution of the  talat.    78-year-old female with a displaced intra-articular supracondylar distal humerus fracture on the left that requires surgical intervention for stabilization.  Additional options were discussed but I believe this to her best treatment option.  She was placed in a long-arm plaster splint in the office today.  Will plan for surgery next Thursday 5/30.  Nonoperative and operative treatment options were presented to the patient. After discussion, operative treatment was elected. Risks and benefits of surgery were discussed with the patient which include, but are not limited to, death, infection, bleeding, neurologic damage, nonunion, malunion, posttraumatic arthritis, incomplete resolution of symptoms, failure of the operation, and others. The patient understood and elected to proceed.    Natural History reviewed. All questions answered. The patient was in agreement with the plan.      **This note was created using voice recognition software and was not corrected for typographical or grammatical errors.**

## 2024-05-23 LAB
BB ANTIBODY IDENTIFICATION: NORMAL
CASE #: NORMAL
PATH REV-IMMUNOHEMATOLOGY-PR30: NORMAL

## 2024-05-28 ENCOUNTER — APPOINTMENT (OUTPATIENT)
Dept: PRIMARY CARE | Facility: CLINIC | Age: 79
End: 2024-05-28
Payer: MEDICARE

## 2024-05-28 LAB
LAB AP ASR DISCLAIMER: NORMAL
LABORATORY COMMENT REPORT: NORMAL
PATH REPORT.FINAL DX SPEC: NORMAL
PATH REPORT.GROSS SPEC: NORMAL
PATH REPORT.RELEVANT HX SPEC: NORMAL
PATH REPORT.TOTAL CANCER: NORMAL

## 2024-05-29 ENCOUNTER — ANESTHESIA EVENT (OUTPATIENT)
Dept: OPERATING ROOM | Facility: HOSPITAL | Age: 79
End: 2024-05-29
Payer: MEDICARE

## 2024-05-30 ENCOUNTER — APPOINTMENT (OUTPATIENT)
Dept: RADIOLOGY | Facility: HOSPITAL | Age: 79
End: 2024-05-30
Payer: MEDICARE

## 2024-05-30 ENCOUNTER — ANESTHESIA (OUTPATIENT)
Dept: OPERATING ROOM | Facility: HOSPITAL | Age: 79
End: 2024-05-30
Payer: MEDICARE

## 2024-05-30 ENCOUNTER — HOSPITAL ENCOUNTER (OUTPATIENT)
Facility: HOSPITAL | Age: 79
Setting detail: OUTPATIENT SURGERY
Discharge: HOME | End: 2024-05-30
Attending: ORTHOPAEDIC SURGERY | Admitting: ORTHOPAEDIC SURGERY
Payer: MEDICARE

## 2024-05-30 VITALS
SYSTOLIC BLOOD PRESSURE: 119 MMHG | OXYGEN SATURATION: 96 % | BODY MASS INDEX: 23.9 KG/M2 | HEIGHT: 64 IN | RESPIRATION RATE: 16 BRPM | HEART RATE: 80 BPM | DIASTOLIC BLOOD PRESSURE: 61 MMHG | TEMPERATURE: 97 F | WEIGHT: 139.99 LBS

## 2024-05-30 DIAGNOSIS — S42.411A CLOSED FRACTURE OF SUPRACONDYLAR HUMERUS, RIGHT, INITIAL ENCOUNTER: Primary | ICD-10-CM

## 2024-05-30 DIAGNOSIS — S42.492A CLOSED BICONDYLAR FRACTURE OF DISTAL HUMERUS, LEFT, INITIAL ENCOUNTER: ICD-10-CM

## 2024-05-30 LAB
ABO GROUP (TYPE) IN BLOOD: NORMAL
RH FACTOR (ANTIGEN D): NORMAL

## 2024-05-30 PROCEDURE — C1713 ANCHOR/SCREW BN/BN,TIS/BN: HCPCS | Mod: MUE | Performed by: ORTHOPAEDIC SURGERY

## 2024-05-30 PROCEDURE — 2720000007 HC OR 272 NO HCPCS: Performed by: ORTHOPAEDIC SURGERY

## 2024-05-30 PROCEDURE — 36415 COLL VENOUS BLD VENIPUNCTURE: CPT | Performed by: ORTHOPAEDIC SURGERY

## 2024-05-30 PROCEDURE — 3600000004 HC OR TIME - INITIAL BASE CHARGE - PROCEDURE LEVEL FOUR: Performed by: ORTHOPAEDIC SURGERY

## 2024-05-30 PROCEDURE — 7100000001 HC RECOVERY ROOM TIME - INITIAL BASE CHARGE: Performed by: ORTHOPAEDIC SURGERY

## 2024-05-30 PROCEDURE — 24546 OPTX HUM FX W/NTRCNDYLR XTN: CPT | Performed by: ORTHOPAEDIC SURGERY

## 2024-05-30 PROCEDURE — 99223 1ST HOSP IP/OBS HIGH 75: CPT | Performed by: STUDENT IN AN ORGANIZED HEALTH CARE EDUCATION/TRAINING PROGRAM

## 2024-05-30 PROCEDURE — 3600000009 HC OR TIME - EACH INCREMENTAL 1 MINUTE - PROCEDURE LEVEL FOUR: Performed by: ORTHOPAEDIC SURGERY

## 2024-05-30 PROCEDURE — 3700000002 HC GENERAL ANESTHESIA TIME - EACH INCREMENTAL 1 MINUTE: Performed by: ORTHOPAEDIC SURGERY

## 2024-05-30 PROCEDURE — 2500000004 HC RX 250 GENERAL PHARMACY W/ HCPCS (ALT 636 FOR OP/ED): Performed by: ANESTHESIOLOGY

## 2024-05-30 PROCEDURE — 2500000004 HC RX 250 GENERAL PHARMACY W/ HCPCS (ALT 636 FOR OP/ED): Performed by: ORTHOPAEDIC SURGERY

## 2024-05-30 PROCEDURE — 3700000001 HC GENERAL ANESTHESIA TIME - INITIAL BASE CHARGE: Performed by: ORTHOPAEDIC SURGERY

## 2024-05-30 PROCEDURE — 7100000002 HC RECOVERY ROOM TIME - EACH INCREMENTAL 1 MINUTE: Performed by: ORTHOPAEDIC SURGERY

## 2024-05-30 PROCEDURE — 7100000010 HC PHASE TWO TIME - EACH INCREMENTAL 1 MINUTE: Performed by: ORTHOPAEDIC SURGERY

## 2024-05-30 PROCEDURE — 7100000009 HC PHASE TWO TIME - INITIAL BASE CHARGE: Performed by: ORTHOPAEDIC SURGERY

## 2024-05-30 PROCEDURE — 86902 BLOOD TYPE ANTIGEN DONOR EA: CPT

## 2024-05-30 PROCEDURE — 2500000005 HC RX 250 GENERAL PHARMACY W/O HCPCS: Performed by: NURSE ANESTHETIST, CERTIFIED REGISTERED

## 2024-05-30 PROCEDURE — A4217 STERILE WATER/SALINE, 500 ML: HCPCS | Performed by: ORTHOPAEDIC SURGERY

## 2024-05-30 PROCEDURE — 2500000004 HC RX 250 GENERAL PHARMACY W/ HCPCS (ALT 636 FOR OP/ED)

## 2024-05-30 PROCEDURE — 2780000003 HC OR 278 NO HCPCS: Mod: MUE | Performed by: ORTHOPAEDIC SURGERY

## 2024-05-30 PROCEDURE — 2500000004 HC RX 250 GENERAL PHARMACY W/ HCPCS (ALT 636 FOR OP/ED): Performed by: NURSE ANESTHETIST, CERTIFIED REGISTERED

## 2024-05-30 DEVICE — SCREW, LOCK VA 2.7 X 16 ST T8 SDRV: Type: IMPLANTABLE DEVICE | Site: ELBOW | Status: FUNCTIONAL

## 2024-05-30 DEVICE — SCREW, LOCK VA 2.7 X 26 ST T8: Type: IMPLANTABLE DEVICE | Site: ELBOW | Status: FUNCTIONAL

## 2024-05-30 DEVICE — IMPLANTABLE DEVICE: Type: IMPLANTABLE DEVICE | Site: ELBOW | Status: FUNCTIONAL

## 2024-05-30 DEVICE — SCREW, LOCK VA 2.7 X 44 ST T8: Type: IMPLANTABLE DEVICE | Site: ELBOW | Status: FUNCTIONAL

## 2024-05-30 DEVICE — SCREW, LOCK VA 2.7 X 28 ST T8: Type: IMPLANTABLE DEVICE | Site: ELBOW | Status: FUNCTIONAL

## 2024-05-30 DEVICE — SCREW, CORTICAL, SELF-TAPPING, 3.5 X 18 MM, STAINLESS STEEL: Type: IMPLANTABLE DEVICE | Site: ELBOW | Status: FUNCTIONAL

## 2024-05-30 DEVICE — SCREW, CORTEX, SELF TAP, W/T6 STARDRIVE, 2.0 X 16MM: Type: IMPLANTABLE DEVICE | Site: ELBOW | Status: FUNCTIONAL

## 2024-05-30 DEVICE — SCREW, CORTICAL, SELF-TAPPING, 3.5 X 24 MM, STAINLESS STEEL: Type: IMPLANTABLE DEVICE | Site: ELBOW | Status: FUNCTIONAL

## 2024-05-30 DEVICE — SCREW, CORTEX, SELF TAP, W/T6 STARDRIVE, 2.0 X 12MM: Type: IMPLANTABLE DEVICE | Site: ELBOW | Status: FUNCTIONAL

## 2024-05-30 DEVICE — SCREW, LOCK VA 2.7 X 42 ST T8: Type: IMPLANTABLE DEVICE | Site: ELBOW | Status: FUNCTIONAL

## 2024-05-30 DEVICE — SCREW, LOCK 2.7 X 20 VA ST T8 STARDRIVE RECESS: Type: IMPLANTABLE DEVICE | Site: ELBOW | Status: FUNCTIONAL

## 2024-05-30 DEVICE — SCREW, CORT 2.0 X 14 ST T6 SDRV: Type: IMPLANTABLE DEVICE | Site: ELBOW | Status: FUNCTIONAL

## 2024-05-30 RX ORDER — LIDOCAINE HCL/PF 100 MG/5ML
SYRINGE (ML) INTRAVENOUS AS NEEDED
Status: DISCONTINUED | OUTPATIENT
Start: 2024-05-30 | End: 2024-05-30

## 2024-05-30 RX ORDER — SODIUM CHLORIDE, SODIUM LACTATE, POTASSIUM CHLORIDE, CALCIUM CHLORIDE 600; 310; 30; 20 MG/100ML; MG/100ML; MG/100ML; MG/100ML
INJECTION, SOLUTION INTRAVENOUS CONTINUOUS PRN
Status: DISCONTINUED | OUTPATIENT
Start: 2024-05-30 | End: 2024-05-30

## 2024-05-30 RX ORDER — AMOXICILLIN 250 MG
1 CAPSULE ORAL DAILY
Qty: 7 TABLET | Refills: 2 | Status: SHIPPED | OUTPATIENT
Start: 2024-05-30 | End: 2024-06-20

## 2024-05-30 RX ORDER — OXYCODONE HYDROCHLORIDE 5 MG/1
5 TABLET ORAL EVERY 4 HOURS PRN
Status: DISCONTINUED | OUTPATIENT
Start: 2024-05-30 | End: 2024-05-30 | Stop reason: HOSPADM

## 2024-05-30 RX ORDER — VANCOMYCIN HYDROCHLORIDE 1 G/20ML
INJECTION, POWDER, LYOPHILIZED, FOR SOLUTION INTRAVENOUS AS NEEDED
Status: DISCONTINUED | OUTPATIENT
Start: 2024-05-30 | End: 2024-05-30 | Stop reason: HOSPADM

## 2024-05-30 RX ORDER — SODIUM CHLORIDE 9 MG/ML
100 INJECTION, SOLUTION INTRAVENOUS CONTINUOUS
Status: DISCONTINUED | OUTPATIENT
Start: 2024-05-30 | End: 2024-05-30 | Stop reason: HOSPADM

## 2024-05-30 RX ORDER — TOBRAMYCIN 1.2 G/30ML
INJECTION, POWDER, LYOPHILIZED, FOR SOLUTION INTRAVENOUS AS NEEDED
Status: DISCONTINUED | OUTPATIENT
Start: 2024-05-30 | End: 2024-05-30 | Stop reason: HOSPADM

## 2024-05-30 RX ORDER — FENTANYL CITRATE 50 UG/ML
INJECTION, SOLUTION INTRAMUSCULAR; INTRAVENOUS AS NEEDED
Status: DISCONTINUED | OUTPATIENT
Start: 2024-05-30 | End: 2024-05-30

## 2024-05-30 RX ORDER — CEFAZOLIN 1 G/1
INJECTION, POWDER, FOR SOLUTION INTRAVENOUS AS NEEDED
Status: DISCONTINUED | OUTPATIENT
Start: 2024-05-30 | End: 2024-05-30

## 2024-05-30 RX ORDER — SODIUM CHLORIDE, SODIUM LACTATE, POTASSIUM CHLORIDE, CALCIUM CHLORIDE 600; 310; 30; 20 MG/100ML; MG/100ML; MG/100ML; MG/100ML
100 INJECTION, SOLUTION INTRAVENOUS CONTINUOUS
Status: DISCONTINUED | OUTPATIENT
Start: 2024-05-30 | End: 2024-05-30 | Stop reason: HOSPADM

## 2024-05-30 RX ORDER — FERROUS SULFATE, DRIED 160(50) MG
1 TABLET, EXTENDED RELEASE ORAL DAILY
Qty: 30 TABLET | Refills: 2 | Status: SHIPPED | OUTPATIENT
Start: 2024-05-30 | End: 2024-06-29

## 2024-05-30 RX ORDER — ROCURONIUM BROMIDE 10 MG/ML
INJECTION, SOLUTION INTRAVENOUS AS NEEDED
Status: DISCONTINUED | OUTPATIENT
Start: 2024-05-30 | End: 2024-05-30

## 2024-05-30 RX ORDER — HYDROCODONE BITARTRATE AND ACETAMINOPHEN 5; 325 MG/1; MG/1
1 TABLET ORAL EVERY 6 HOURS PRN
Qty: 28 TABLET | Refills: 0 | Status: SHIPPED | OUTPATIENT
Start: 2024-05-30

## 2024-05-30 RX ORDER — HYDROMORPHONE HYDROCHLORIDE 1 MG/ML
0.5 INJECTION, SOLUTION INTRAMUSCULAR; INTRAVENOUS; SUBCUTANEOUS EVERY 5 MIN PRN
Status: DISCONTINUED | OUTPATIENT
Start: 2024-05-30 | End: 2024-05-30 | Stop reason: HOSPADM

## 2024-05-30 RX ORDER — ONDANSETRON HYDROCHLORIDE 2 MG/ML
4 INJECTION, SOLUTION INTRAVENOUS ONCE AS NEEDED
Status: DISCONTINUED | OUTPATIENT
Start: 2024-05-30 | End: 2024-05-30 | Stop reason: HOSPADM

## 2024-05-30 RX ORDER — ASPIRIN 81 MG/1
81 TABLET ORAL 2 TIMES DAILY
Qty: 60 TABLET | Refills: 0 | Status: SHIPPED | OUTPATIENT
Start: 2024-05-30 | End: 2024-06-29

## 2024-05-30 RX ORDER — PROPOFOL 10 MG/ML
INJECTION, EMULSION INTRAVENOUS AS NEEDED
Status: DISCONTINUED | OUTPATIENT
Start: 2024-05-30 | End: 2024-05-30

## 2024-05-30 RX ORDER — PHENYLEPHRINE HCL IN 0.9% NACL 0.4MG/10ML
SYRINGE (ML) INTRAVENOUS AS NEEDED
Status: DISCONTINUED | OUTPATIENT
Start: 2024-05-30 | End: 2024-05-30

## 2024-05-30 RX ORDER — EPINEPHRINE 1 MG/ML
INJECTION INTRAMUSCULAR; INTRAVENOUS; SUBCUTANEOUS AS NEEDED
Status: DISCONTINUED | OUTPATIENT
Start: 2024-05-30 | End: 2024-05-30

## 2024-05-30 RX ORDER — CEFAZOLIN SODIUM 2 G/100ML
2 INJECTION, SOLUTION INTRAVENOUS ONCE
Status: DISCONTINUED | OUTPATIENT
Start: 2024-05-30 | End: 2024-05-30 | Stop reason: HOSPADM

## 2024-05-30 RX ORDER — LIDOCAINE HYDROCHLORIDE 10 MG/ML
0.1 INJECTION INFILTRATION; PERINEURAL ONCE
Status: DISCONTINUED | OUTPATIENT
Start: 2024-05-30 | End: 2024-05-30 | Stop reason: HOSPADM

## 2024-05-30 RX ORDER — ONDANSETRON HYDROCHLORIDE 2 MG/ML
INJECTION, SOLUTION INTRAVENOUS AS NEEDED
Status: DISCONTINUED | OUTPATIENT
Start: 2024-05-30 | End: 2024-05-30

## 2024-05-30 RX ORDER — CEPHALEXIN 500 MG/1
500 CAPSULE ORAL 3 TIMES DAILY
Qty: 3 CAPSULE | Refills: 0 | Status: SHIPPED | OUTPATIENT
Start: 2024-05-30 | End: 2024-05-31

## 2024-05-30 RX ORDER — ROPIVACAINE HYDROCHLORIDE 5 MG/ML
INJECTION, SOLUTION EPIDURAL; INFILTRATION; PERINEURAL AS NEEDED
Status: DISCONTINUED | OUTPATIENT
Start: 2024-05-30 | End: 2024-05-30

## 2024-05-30 RX ORDER — TRANEXAMIC ACID 100 MG/ML
INJECTION, SOLUTION INTRAVENOUS AS NEEDED
Status: DISCONTINUED | OUTPATIENT
Start: 2024-05-30 | End: 2024-05-30

## 2024-05-30 RX ORDER — SODIUM CHLORIDE 0.9 G/100ML
IRRIGANT IRRIGATION AS NEEDED
Status: DISCONTINUED | OUTPATIENT
Start: 2024-05-30 | End: 2024-05-30 | Stop reason: HOSPADM

## 2024-05-30 RX ADMIN — Medication 160 MCG: at 07:42

## 2024-05-30 RX ADMIN — PROPOFOL 100 MG: 10 INJECTION, EMULSION INTRAVENOUS at 07:25

## 2024-05-30 RX ADMIN — CEFAZOLIN 2 G: 1 INJECTION, POWDER, FOR SOLUTION INTRAMUSCULAR; INTRAVENOUS at 07:41

## 2024-05-30 RX ADMIN — Medication 120 MCG: at 07:38

## 2024-05-30 RX ADMIN — ROCURONIUM BROMIDE 20 MG: 10 INJECTION INTRAVENOUS at 09:09

## 2024-05-30 RX ADMIN — ONDANSETRON 4 MG: 2 INJECTION INTRAMUSCULAR; INTRAVENOUS at 09:59

## 2024-05-30 RX ADMIN — ROCURONIUM BROMIDE 20 MG: 10 INJECTION INTRAVENOUS at 09:55

## 2024-05-30 RX ADMIN — SUGAMMADEX 400 MG: 100 INJECTION, SOLUTION INTRAVENOUS at 10:13

## 2024-05-30 RX ADMIN — TRANEXAMIC ACID 1000 MG: 100 INJECTION INTRAVENOUS at 07:41

## 2024-05-30 RX ADMIN — LIDOCAINE HYDROCHLORIDE 75 MG: 20 INJECTION INTRAVENOUS at 07:25

## 2024-05-30 RX ADMIN — Medication 120 MCG: at 07:52

## 2024-05-30 RX ADMIN — Medication 200 MCG: at 07:54

## 2024-05-30 RX ADMIN — FENTANYL CITRATE 100 MCG: 50 INJECTION, SOLUTION INTRAMUSCULAR; INTRAVENOUS at 07:25

## 2024-05-30 RX ADMIN — SODIUM CHLORIDE, POTASSIUM CHLORIDE, SODIUM LACTATE AND CALCIUM CHLORIDE 100 ML/HR: 600; 310; 30; 20 INJECTION, SOLUTION INTRAVENOUS at 10:43

## 2024-05-30 RX ADMIN — PROPOFOL 100 MG: 10 INJECTION, EMULSION INTRAVENOUS at 07:33

## 2024-05-30 RX ADMIN — ROPIVACAINE HYDROCHLORIDE 30 ML: 5 INJECTION, SOLUTION EPIDURAL; INFILTRATION; PERINEURAL at 06:15

## 2024-05-30 RX ADMIN — ROCURONIUM BROMIDE 50 MG: 10 INJECTION INTRAVENOUS at 07:25

## 2024-05-30 RX ADMIN — EPINEPHRINE 100 MCG: 1 INJECTION INTRAMUSCULAR; INTRAVENOUS; SUBCUTANEOUS at 06:15

## 2024-05-30 RX ADMIN — DEXAMETHASONE SODIUM PHOSPHATE 10 MG: 4 INJECTION INTRA-ARTICULAR; INTRALESIONAL; INTRAMUSCULAR; INTRAVENOUS; SOFT TISSUE at 07:41

## 2024-05-30 RX ADMIN — Medication 120 MCG: at 08:06

## 2024-05-30 RX ADMIN — ROCURONIUM BROMIDE 20 MG: 10 INJECTION INTRAVENOUS at 08:29

## 2024-05-30 RX ADMIN — SODIUM CHLORIDE, POTASSIUM CHLORIDE, SODIUM LACTATE AND CALCIUM CHLORIDE: 600; 310; 30; 20 INJECTION, SOLUTION INTRAVENOUS at 06:53

## 2024-05-30 SDOH — HEALTH STABILITY: MENTAL HEALTH: CURRENT SMOKER: 0

## 2024-05-30 ASSESSMENT — PAIN - FUNCTIONAL ASSESSMENT
PAIN_FUNCTIONAL_ASSESSMENT: 0-10

## 2024-05-30 ASSESSMENT — COLUMBIA-SUICIDE SEVERITY RATING SCALE - C-SSRS
2. HAVE YOU ACTUALLY HAD ANY THOUGHTS OF KILLING YOURSELF?: NO
6. HAVE YOU EVER DONE ANYTHING, STARTED TO DO ANYTHING, OR PREPARED TO DO ANYTHING TO END YOUR LIFE?: NO
1. IN THE PAST MONTH, HAVE YOU WISHED YOU WERE DEAD OR WISHED YOU COULD GO TO SLEEP AND NOT WAKE UP?: NO

## 2024-05-30 ASSESSMENT — PAIN SCALES - GENERAL
PAINLEVEL_OUTOF10: 3
PAINLEVEL_OUTOF10: 0 - NO PAIN
PAIN_LEVEL: 4
PAINLEVEL_OUTOF10: 0 - NO PAIN

## 2024-05-30 NOTE — CONSULTS
Gertrudis Cottrell is a 78 y.o. year old female patient who presents for Left open reduction internal fixation distal humerus with Dr Oates. Acute Pain consulted for block for postoperative pain control.     Anticipated Postop Pain Issues -   Palliative: typically relieved with IV analgesics and regional local anesthetics  Provocative: typically with movement  Quality: typically burning and aching  Radiation: typically none  Severity: typically severe 8-10/10  Timing: typically constant    Past Medical History:   Diagnosis Date    Anemia     Hyperlipidemia     Hypertension     Hypothyroidism     PONV (postoperative nausea and vomiting)         Past Surgical History:   Procedure Laterality Date    BREAST BIOPSY  1980s    HYSTERECTOMY  1997        No family history on file.     Social History     Socioeconomic History    Marital status:      Spouse name: Not on file    Number of children: Not on file    Years of education: Not on file    Highest education level: Not on file   Occupational History    Not on file   Tobacco Use    Smoking status: Never    Smokeless tobacco: Never   Vaping Use    Vaping status: Never Used   Substance and Sexual Activity    Alcohol use: Not Currently    Drug use: Not Currently    Sexual activity: Not on file   Other Topics Concern    Not on file   Social History Narrative    Not on file     Social Determinants of Health     Financial Resource Strain: Not on file   Food Insecurity: Not on file   Transportation Needs: Not on file   Physical Activity: Not on file   Stress: Not on file   Social Connections: Not on file   Intimate Partner Violence: Not on file   Housing Stability: Not on file        Allergies   Allergen Reactions    Ciprofloxacin Diarrhea         Review of Systems  Gen: No fatigue, anorexia, insomnia, fever.   Eyes: No vision loss, double vision, drainage, eye pain.   ENT: No pharyngitis, dry mouth, no hearing changes or ear discharge  Cardiac: No chest pain,  palpitations, syncope, near syncope.   Pulmonary: No shortness of breath, cough, hemoptysis.   Heme/lymph: No swollen glands, fever, bleeding.   GI: No abdominal pain, change in bowel habits, melena, hematemesis, hematochezia, nausea, vomiting, diarrhea.   : No discharge, dysuria, frequency, urgency, hematuria.  Endo: No polyuria or weight loss.   Musculoskeletal: Negative for any pain  Skin: No rashes or lesions  Neuro: Normal speech, no numbness or weakness. No gait difficulties  Review of systems is otherwise negative unless stated above or in history of present illness.    Physical Exam:  Constitutional:  no distress, alert and cooperative  Eyes: clear sclera  Head/Neck: No apparent injury, trachea midline  Respiratory/Thorax: Patent airways, thorax symmetric, breathing comfortably  Cardiovascular: no pitting edema  Gastrointestinal: Nondistended  Musculoskeletal: ROM intact, L arm in cast  Extremities: no clubbing  Neurological: alert, marsh with L arm in cast  Psychological: Appropriate affect      Assessment: Gertrudis Cottrell is a 78 y.o. year old female patient who presents for Left open reduction internal fixation distal humerus with Dr Oates. Acute Pain consulted for block for postoperative pain control.     Plan:  - L supraclavicular single shot block performed preoperatively on 5/30/24  - Please be aware of local anesthetic toxic dose and absorption variability before considering lidocaine patches  - Acute pain service will follow POD1 if inpatient  - Rest of pain management per primary team    Acute Pain Resident  pg [phone] ph [phone]

## 2024-05-30 NOTE — OP NOTE
Open Reduction Internal Fixation Distal Humerus (L) Operative Note     Date: 2024  OR Location: [facility] OR    Name: Gertrudis Cottrell, : 1945, Age: 78 y.o., MRN: [id number], Sex: female    Diagnosis  Pre-op Diagnosis     * Closed fracture of supracondylar humerus, right, initial encounter [S42.411A] Post-op Diagnosis     * Closed fracture of supracondylar humerus, right, initial encounter [S42.411A]     Procedures  Open Reduction Internal Fixation Distal Humerus  91083 - ND OPEN TX HUMERAL SUPRACONDYLAR FRACTURE W/XTN  This was of increased complexity and operative time due to the high degree of bony impaction and bone loss requiring the use of a bone void filler at the lateral condyle    Decompression neuroplasty left ulnar nerve    Surgeons      * Ezequiel Oates - Primary    Resident/Fellow/Other Assistant:  Surgeons and Role:     * Hilda Patton PA-C - Assisting     * Madelin Mcfarland MD - Resident - Assisting    Procedure Summary  Anesthesia: General  ASA: III  Anesthesia Staff: Anesthesiologist: Eliseo Joshi MD  CRNA: JOANNE Barnes-CRNA; HEMANT Guerin  Estimated Blood Loss: Please see anesthesia record   Intra-op Medications:   Administrations occurring from 0715 to 1000 on 24:   Medication Name Total Dose   vancomycin (Vancocin) vial for injection 1 g   tobramycin (Nebcin) injection 381 mg   sodium chloride 0.9 % irrigation solution 3,000 mL              Anesthesia Record               Intraprocedure I/O Totals          Intake    Dexmedetomidine 0.00 mL    The total shown is the total volume documented since Anesthesia Start was filed.    Tranexamic Acid 0.00 mL    The total shown is the total volume documented since Anesthesia Start was filed.    Total Intake 0 mL       Output    Est. Blood Loss 10 mL    Total Output 10 mL       Net    Net Volume -10 mL          Specimen: No specimens collected     Staff:   Circulator: Jackeline Barrera Person: Jorge Luis          Drains and/or Catheters: * None in log *    Tourniquet Times:     Total Tourniquet Time Documented:  Arm - Upper (Left) - 116 minutes  Total: Arm - Upper (Left) - 116 minutes      Implants:  Implants       Type Name Action Serial No.      Screw PLATE, LPC 2.0 X 38 5H - [id number] Implanted      Screw SCREW, CORTEX, SELF TAP, W/T6 STARDRIVE, 2.0 X 12MM - [id number] Implanted      Screw SCREW, VELIA 2.0 X 14 ST T6 SDRV - [id number] Implanted      Screw SCREW, LOCK 2.0 X 8 ST T6 STARDRV - [id number] Implanted      Screw SCREW, VELIA 2.0 X 14 ST T6 SDRV - [id number] Implanted      Screw SCREW, CORTEX, SELF TAP, W/T6 STARDRIVE, 2.0 X 16MM - [id number] Implanted      Screw SCREW, CORTEX, SELF TAP, W/T6 STARDRIVE, 2.0 X 18MM - [id number] Implanted      Screw SCREW CORTEX 1.5 X 18MM, SELF TAP W/T4 STARDRIVE - [id number] Implanted      Screw SCREW CORTEX 1.5 X 16MM, SELF TAP W/T4 STARDRIVE - [id number] Implanted      Screw SCREW, CORTICAL, SELF-TAPPING, 3.5 X 24 MM, STAINLESS STEEL - [id number] Implanted      Screw PLATE, 2.7/3.5MM LAT DSTL HUM, 1H LT 69MM-SHORT - [id number] Implanted      Screw SCREW, 2.7 X 44 ST METAPHYSEAL T8 - [id number] Implanted      Screw SCREW, LOCK VA 2.7 X 42 ST T8 - [id number] Implanted      Screw SCREW, LOCK VA 2.7 X 44 ST T8 - [id number] Implanted      Screw HUMERAL, MEDIAL DISTAL, VA-LCP, 1 HOLE, LT, SHORT, 72MM - [id number] Implanted      Screw SCREW, CORTICAL, SELF-TAPPING, 3.5 X 18 MM, STAINLESS STEEL - [id number] Implanted      Screw SCREW, LOCK VA 2.7 X 16 ST T8 SDRV - [id number] Implanted      Screw SCREW, LOCK 2.7 X 20 VA ST T8 STARDRIVE RECESS - [id number] Implanted      Screw SCREW, LOCK VA 2.7 X 58 ST T8 - [id number] Implanted      Screw SCREW, LOCK VA 2.7 X 26 ST T8 - [id number] Implanted      Implant OSTEOCRETE, BONE VOID FILLER, 5CC - [id number] Implanted      Screw SCREW, LOCK VA 2.7 X 28 ST T8 - [id number] Implanted               Findings: Please see below    Indications: Tricia  Adina Cottrell is an 78 y.o. female who is having surgery for Closed fracture of supracondylar humerus, right, initial encounter [S42.626R].     The patient was seen in the preoperative area. The risks, benefits, complications, treatment options, non-operative alternatives, expected recovery and outcomes were discussed with the patient. The possibilities of reaction to medication, pulmonary aspiration, injury to surrounding structures, bleeding, recurrent infection, the need for additional procedures, failure to diagnose a condition, and creating a complication requiring transfusion or operation were discussed with the patient. The patient concurred with the proposed plan, giving informed consent.  The site of surgery was properly noted/marked if necessary per policy. The patient has been actively warmed in preoperative area. Preoperative antibiotics have been ordered and given within 1 hours of incision. Venous thrombosis prophylaxis have been ordered including bilateral sequential compression devices    Procedure Details: The patient was met in the preoperative holding area and identified by name and medical record number.  She was transferred to the operating room and positioned lateral on a flat Joseph table with a plexiglass armboard.  An axillary roll was utilized all bony problems were padded.  Preoperative antibiotic prophylaxis and tranexamic acid was administered.  Preoperative timeout was performed by myself prior to prepping the correct operative hole was identified.  The limb was then prepped and draped in the usual sterile fashion using ChloraPrep.  We first made an extensile posterior approach incision with a 10 blade scalpel.  Tourniquet was elevated prior to making incision.  Full-thickness cutaneous flaps were then raised after incising the triceps fascia and the olecranon bursa.  Suture retractors were then used to the skin flaps and the lateral para tricipital window was bluntly developed using  tenotomy scissors and electrocautery.  Blunt dissection was then carried down to the posterior aspect of the humerus and sharp incision was made to create an arthrotomy laterally in the area of the radiocapitellar and posterior ulnotrochlear joint.  We then raised our medial window and located the ulnar nerve.  Neuroplasty and full dissection with ulnar nerve decompression at the cubital tunnel was then performed.  This was then protected with a vessel loop.  We then made our more superior medial arthrotomy to gain access to the intra-articular space and cleaned off the area of the epicondyle with a lateral extent of the fracture at the fossa as well as the medial extent of the medial cortex was located.  Were then able to realign things medially first and used a 2.0 mini fragment plate to temporarily and fixate the medial column.  We then moved over to the lateral side and was able to locate the lateral column read which was from a comminuted piece of cortex that was pinned in place with a 0.062 K wire followed by the capitellar piece which was separate.  The arthrotomy was extended and it was noted the capitellar piece was rotated 90 degrees.  This was derotated with an additional K wire and then pinned in place to match the trochlear segment anatomically at the articular surface.  There was a heavy degree of bone loss in the area of the lateral condyle just proximal to the capitellum and there is a piece of osteochondral articular surface that was embedded in the posterior triceps that had to be released.  This piece was a large independent piece that was first pinned in place and anatomic reduction and then held in place with 2 independent 1.5 mm mini fragment screws.  Additional 1.5 mm mini fragment screw was then placed to hold the superior aspect of the lateral cortical piece and placed followed by a 2.0 mini fragment screw.  A K wire was then removed at the articular surface after it was supplemented first  with a 2.7 mm position screw after compression was achieved with a large pointed reduction clamp.  With the joint fixed and compressed we then removed the K wires and temporizing clamps and placed a lateral based variable angle locking plate by anchoring it first proximally with a 3.5 mm nonlocking screw and then multiple periarticular long unicortical 2.7 mm locking screws under fluoroscopic guidance.  Additional proximal fixation was then placed to secure the plate.  We then placed a medial plate around the medial epicondyle and used multiple points of fixation to lock into the articular block and proximally the plate was secured first with a 3.5 mm nonlocking screw followed by additional 2.7 mm bicortical locking screws.  We then assessed the area of bone loss and used 5 cc of osteocrete magnesium bone void filler to pack into the void and support the subchondral bone of the independent osteochondral piece.  At the completion of fixation we then released the vessel loop and took and saved final x-rays.  Fluoroscopic guidance was used throughout the entirety of the case to ensure extra-articular position of the screws and anatomic reduction.  Once final x-rays taken and saved the tourniquet was dropped and hemostasis was obtained.  Copious amounts of irrigation of the past to the wound.  Antibiotic powder was placed deeply prevent infection and the wound was then closed in layers in a standard fashion with 1 Vicryl 2-0 Vicryl and staples.  Sterile dressing was applied and a standard posterior mold sugar-tong long-arm splint was then placed in 90 degrees of elbow flexion and neutral forearm rotation.    Postop plan:    No lifting left upper extremity.  No motion for 2 weeks.  Maintain splint clean and dry.  Transition to a hinged elbow brace for gentle motion in 2 weeks with staple removal and 2 views left elbow in the office when she follows up.  DVT prophylaxis with aspirin and infection prophylaxis with oral  antibiotics for 24 hours.  Calcium and vitamin D for bone health.  Complications:  None; patient tolerated the procedure well.    Disposition: PACU - hemodynamically stable.  Condition: stable         Additional Details: None additional    Attending Attestation: I was present and scrubbed for the key portions of the procedure.    Ezequiel Oates  Phone Number: [phone]

## 2024-05-30 NOTE — ANESTHESIA PROCEDURE NOTES
Airway  Date/Time: 5/30/2024 7:56 AM  Urgency: elective    Airway not difficult    Staffing  Performed: CRNA   Authorized by: HEMANT Barnes    Performed by: HEMANT Barnes  Patient location during procedure: OR    Indications and Patient Condition  Indications for airway management: anesthesia and airway protection  Spontaneous Ventilation: absent  Sedation level: deep  Preoxygenated: yes  Patient position: sniffing  MILS not maintained throughout  Mask difficulty assessment: 1 - vent by mask  Planned trial extubation    Final Airway Details  Final airway type: endotracheal airway      Successful airway: ETT  Cuffed: yes   Successful intubation technique: direct laryngoscopy  Facilitating devices/methods: intubating stylet  Endotracheal tube insertion site: oral  Blade: Mickey  Blade size: #3  ETT size (mm): 7.0  Cormack-Lehane Classification: grade IIb - view of arytenoids or posterior of glottis only  Placement verified by: chest auscultation and capnometry   Measured from: teeth  ETT to teeth (cm): 21  Number of attempts at approach: 1  Ventilation between attempts: BVM  Number of other approaches attempted: 0    Additional Comments  Ett taped in place, min air to cuff for deal

## 2024-05-30 NOTE — H&P
[facility] Department of Orthopaedic Surgery   Surgical History & Physical >30 Days    Reason for Surgery: left distal humerus fx  Planned Procedure: operative fixation of left distal humerus    History & Physical Reviewed:  Gertrudis Cottrell is a 78 y.o. female presenting with the above symptoms. This patient was evaluated as an outpatient, and a plan was made for operative management. Risks and benefits were discussed, and the patient and/or caregivers elected to proceed. The patient presents for the above listed procedure today.     No past medical history on file.  Past Surgical History:   Procedure Laterality Date    BREAST BIOPSY  1980s    HYSTERECTOMY  1997     Social History     Tobacco Use    Smoking status: Never    Smokeless tobacco: Never   Substance Use Topics    Alcohol use: Not Currently     Prior to Admission medications    Medication Sig Start Date End Date Taking? Authorizing Provider   atorvastatin (Lipitor) 10 mg tablet TAKE 1 TABLET (10 MG) BY MOUTH ONCE DAILY AT BEDTIME. 4/4/24   Ezequiel Briseno MD   calcium carbonate/vitamin D3 (CALCIUM + D ORAL) Take by mouth.    Historical Provider, MD   cyanocobalamin (Vitamin B-12) 1,000 mcg/mL injection INJECT 1ML IN THE MUSCLE ONCE A MONTH 1/26/24   Ezequiel Briseno MD   denosumab (Prolia) 60 mg/mL syringe Inject 1 mL (60 mg total) under the skin every 6 months. 11/23/23   Ezequiel Briseno MD   HYDROcodone-acetaminophen (Norco) 5-325 mg tablet Take 1 tablet by mouth every 6 hours if needed for severe pain (7 - 10) for up to 7 days. 5/21/24 5/28/24  Santy Park MD   ibuprofen 600 mg tablet Take 1 tablet (600 mg) by mouth every 8 hours if needed. 5/16/24   Historical Provider, MD   levothyroxine (Synthroid, Levoxyl) 100 mcg tablet TAKE 1 TABLET (100 MCG) BY MOUTH ONCE DAILY IN THE MORNING. TAKE BEFORE MEALS. 4/4/24   Ezequiel Briseno MD   losartan (Cozaar) 25 mg tablet TAKE 1 TABLET BY MOUTH EVERY DAY 4/4/24   Ezequiel Briseno MD    polyethylene glycol-electrolytes (Golytely) 420 gram solution Begin drinking first half of prep 6pm the night before procedure. Start second half 5 hours before procedure time. 4/15/24   Hollis Johnson MD     No Known Allergies    Review of Systems:   Gen: Denies recent weight loss  Neuro: Denies recent confusion  Ophtho: Denies changes in vision  ENT: Denies changes in hearing  Endo: Denies weight loss/weight gain  CV: Denies chest pain  Resp: Denies shortness of breath  GI: Denies melena/hematochezia  : Denies painful urination  MSK: Per above HPI  Heme: No abnormal bleeding  Psych: Denies hallucinations    Physical Exam:  - Constitutional: No acute distress, cooperative  - Eyes: EOM grossly intact  - Head/Neck: Trachea midline  - Respiratory/Thorax: Normal work of breathing  - Cardiovascular: RRR on peripheral palpation  - Gastrointestinal: Nondistended  - Psychological: Appropriate mood/behavior  - Skin: Warm and dry. Additional findings in musculoskeletal evaluation  - Musculoskeletal: Moves all extremities     ERAS patient?: No    COVID-19 Risk Consent:   Surgeon has reviewed the key risks related to funmilayo COVID-19 and subsequent sequelae.       Marko Chaudhry MD   Orthopaedic Surgery PGY-1

## 2024-05-30 NOTE — ANESTHESIA POSTPROCEDURE EVALUATION
Patient: Gertrudis Cottrell    Procedure Summary       Date: 05/30/24 Room / Location: [facility] OR 08 / Virtual [facility] OR    Anesthesia Start: 0719 Anesthesia Stop: 1037    Procedure: Open Reduction Internal Fixation Distal Humerus (Left: Elbow) Diagnosis:       Closed fracture of supracondylar humerus, right, initial encounter      (Closed fracture of supracondylar humerus, right, initial encounter [S42.411A])    Surgeons: Ezequiel Oates MD Responsible Provider: HEMANT Barnes    Anesthesia Type: general ASA Status: 3            Anesthesia Type: general    Vitals Value Taken Time   /68 05/30/24 1045   Temp 36 °C (96.8 °F) 05/30/24 1032   Pulse 77 05/30/24 1049   Resp 12 05/30/24 1049   SpO2 92 % 05/30/24 1049   Vitals shown include unfiled device data.    Anesthesia Post Evaluation    Patient location during evaluation: PACU  Patient participation: complete - patient participated  Level of consciousness: awake and alert  Pain score: 4  Pain management: adequate  Airway patency: patent  Cardiovascular status: acceptable  Respiratory status: acceptable  Hydration status: acceptable  Postoperative Nausea and Vomiting: none    There were no known notable events for this encounter.

## 2024-05-30 NOTE — PROCEDURES
Peripheral Block    Patient location during procedure: pre-op  Start time: 5/30/2024 6:15 AM  End time: 5/30/2024 6:30 AM  Reason for block: at surgeon's request and post-op pain management  Staffing  Performed: resident   Authorized by: Azalea Higgins MD    Performed by: Pedro Luis Abdi MD  Preanesthetic Checklist  Completed: patient identified, IV checked, site marked, risks and benefits discussed, surgical consent, monitors and equipment checked, pre-op evaluation and timeout performed   Timeout performed at: 5/30/2024 6:30 AM  Peripheral Block  Patient position: laying flat  Prep: ChloraPrep  Patient monitoring: heart rate and continuous pulse ox  Block type: supraclavicular  Laterality: left  Injection technique: single-shot  Guidance: ultrasound guided  Local infiltration: ropivacaine  Infiltration strength: 0.5 %  Dose: 20 mL  Needle  Needle type: pencil-tip   Needle gauge: 22 G  Needle length: 5 cm  Needle localization: ultrasound guidance     image stored in chart  Assessment  Injection assessment: negative aspiration for heme, no paresthesia on injection, incremental injection and local visualized surrounding nerve on ultrasound  Paresthesia pain: none  Heart rate change: no  Slow fractionated injection: yes  Additional Notes  Supraclavicular brachial plexus block: Informed consent obtained.  Risk, benefits, alternatives discussed.  ASA monitors placed and timeout performed.  Patient positioned, prepped with chlorhexidine, and draped with sterile towels.  Ultrasound guidance used to visualize the brachial plexus and surrounding structures with visualization of the needle throughout duration of the procedure.  Aspiration was negative.  20 cc of 0.5% ropivacaine and 16mcg precedex injected.  Patient tolerated procedure well.    Timeout by LEAH Gongora

## 2024-05-30 NOTE — BRIEF OP NOTE
Date: 2024  OR Location: [facility] OR    Name: Gertrudis Cottrell, : 1945, Age: 78 y.o., MRN: [id number], Sex: female    Diagnosis  Pre-op Diagnosis     * Closed fracture of supracondylar humerus, right, initial encounter [S42.411A] Post-op Diagnosis     * Closed fracture of supracondylar humerus, right, initial encounter [S42.411A]     Procedures  Open Reduction Internal Fixation Distal Humerus  75021 - MO OPEN TX HUMERAL SUPRACONDYLAR FRACTURE W/XTN      Surgeons      * Ezequiel Oates - Primary    Resident/Fellow/Other Assistant:  Surgeons and Role:     * Hilda Patton PA-C - Assisting     * Madelin Mcfarland MD - Resident - Assisting    Procedure Summary  Anesthesia: General  ASA: III  Anesthesia Staff: Anesthesiologist: Eliseo Joshi MD  CRNA: JOANNE Barnes-CRNA; JOANNE Guerin-CRNA  Estimated Blood Loss: 5mL  Intra-op Medications:   Administrations occurring from 0715 to 1000 on 24:   Medication Name Total Dose   vancomycin (Vancocin) vial for injection 1 g   tobramycin (Nebcin) injection 381 mg   sodium chloride 0.9 % irrigation solution 3,000 mL              Anesthesia Record               Intraprocedure I/O Totals          Intake    Dexmedetomidine 0.00 mL    The total shown is the total volume documented since Anesthesia Start was filed.    Tranexamic Acid 0.00 mL    The total shown is the total volume documented since Anesthesia Start was filed.    lactated Ringer's 900.00 mL    Total Intake 900 mL       Output    Est. Blood Loss 10 mL    Total Output 10 mL       Net    Net Volume 890 mL          Specimen: No specimens collected     Staff:   Circulator: Jackeline Barrera Person: Ne[place]agar          Findings: distal humerus fracture    Complications:  None; patient tolerated the procedure well.     Disposition: PACU - hemodynamically stable.  Condition: stable  Specimens Collected: No specimens collected    Madelin Mcfarland MD

## 2024-05-31 DIAGNOSIS — K29.70 GASTRITIS WITHOUT BLEEDING, UNSPECIFIED CHRONICITY, UNSPECIFIED GASTRITIS TYPE: Primary | ICD-10-CM

## 2024-06-03 ENCOUNTER — APPOINTMENT (OUTPATIENT)
Dept: INFUSION THERAPY | Facility: CLINIC | Age: 79
End: 2024-06-03
Payer: MEDICARE

## 2024-06-07 ENCOUNTER — LAB (OUTPATIENT)
Dept: LAB | Facility: LAB | Age: 79
End: 2024-06-07
Payer: MEDICARE

## 2024-06-07 DIAGNOSIS — K29.70 GASTRITIS WITHOUT BLEEDING, UNSPECIFIED CHRONICITY, UNSPECIFIED GASTRITIS TYPE: ICD-10-CM

## 2024-06-07 PROCEDURE — 87449 NOS EACH ORGANISM AG IA: CPT

## 2024-06-10 LAB — H PYLORI AG STL QL IA: NEGATIVE

## 2024-06-18 ENCOUNTER — OFFICE VISIT (OUTPATIENT)
Dept: ORTHOPEDIC SURGERY | Facility: CLINIC | Age: 79
End: 2024-06-18
Payer: MEDICARE

## 2024-06-18 ENCOUNTER — HOSPITAL ENCOUNTER (OUTPATIENT)
Dept: RADIOLOGY | Facility: CLINIC | Age: 79
Discharge: HOME | End: 2024-06-18
Payer: MEDICARE

## 2024-06-18 VITALS — HEIGHT: 64 IN | BODY MASS INDEX: 23.73 KG/M2 | WEIGHT: 139 LBS

## 2024-06-18 DIAGNOSIS — S42.492A CLOSED BICONDYLAR FRACTURE OF DISTAL HUMERUS, LEFT, INITIAL ENCOUNTER: ICD-10-CM

## 2024-06-18 DIAGNOSIS — S42.492A CLOSED BICONDYLAR FRACTURE OF DISTAL HUMERUS, LEFT, INITIAL ENCOUNTER: Primary | ICD-10-CM

## 2024-06-18 PROBLEM — Z47.89 ORTHOPEDIC AFTERCARE: Status: ACTIVE | Noted: 2024-06-18

## 2024-06-18 PROCEDURE — 73070 X-RAY EXAM OF ELBOW: CPT | Mod: LEFT SIDE | Performed by: RADIOLOGY

## 2024-06-18 PROCEDURE — 1159F MED LIST DOCD IN RCRD: CPT | Performed by: PHYSICIAN ASSISTANT

## 2024-06-18 PROCEDURE — 73070 X-RAY EXAM OF ELBOW: CPT | Mod: LT

## 2024-06-18 PROCEDURE — 1036F TOBACCO NON-USER: CPT | Performed by: PHYSICIAN ASSISTANT

## 2024-06-18 PROCEDURE — L3761 EO, ADJ LOCK JOINT PREFAB OT: HCPCS | Performed by: PHYSICIAN ASSISTANT

## 2024-06-18 PROCEDURE — 1160F RVW MEDS BY RX/DR IN RCRD: CPT | Performed by: PHYSICIAN ASSISTANT

## 2024-06-18 PROCEDURE — 99024 POSTOP FOLLOW-UP VISIT: CPT | Performed by: PHYSICIAN ASSISTANT

## 2024-06-18 NOTE — PROGRESS NOTES
History of Present Illness   Gertrudis Cottrell is a 78 y.o. female presenting today for post-op check from ORIF left distal humerus fracture on 5/30/2024. Overall doing ok. Has mild pain that is not requiring narcotics for pain control.  Incisions are well healing without redness or drainage. Compliant with WB recommendations in her splint.  Has been having some swelling of her hand that is slowly improving. Denies numbness, tingling, f/c, CP, SOB or any other complaints or concerns.      Past Medical History:   Diagnosis Date    Anemia     Hyperlipidemia     Hypertension     Hypothyroidism     PONV (postoperative nausea and vomiting)        Medication Documentation Review Audit       Reviewed by Ramírez Sheppard on 06/18/24 at 1014      Medication Order Taking? Sig Documenting Provider Last Dose Status   aspirin 81 mg EC tablet [id number]  Take 1 tablet (81 mg) by mouth 2 times a day. Hilda Patton PA-C  Active   atorvastatin (Lipitor) 10 mg tablet [id number] No TAKE 1 TABLET (10 MG) BY MOUTH ONCE DAILY AT BEDTIME. Ezequiel Briseno MD 5/29/2024 Active   calcium carbonate-vitamin D3 500 mg-5 mcg (200 unit) tablet [id number]  Take 1 tablet by mouth once daily. Hilda Patton PA-C  Active   calcium carbonate/vitamin D3 (CALCIUM + D ORAL) [id number] No Take by mouth. Historical Provider, MD Past Week Active   cyanocobalamin (Vitamin B-12) 1,000 mcg/mL injection [id number] No INJECT 1ML IN THE MUSCLE ONCE A MONTH Ezequiel Briseno MD Past Week Active   cyanocobalamin (Vitamin B-12) injection 1,000 mcg [id number]   Ezequiel Briseno MD  Active   denosumab (Prolia) 60 mg/mL syringe [id number] No Inject 1 mL (60 mg total) under the skin every 6 months. Ezequiel Briseno MD Past Week Active   HYDROcodone-acetaminophen (Norco) 5-325 mg tablet [id number]  Take 1 tablet by mouth every 6 hours if needed for severe pain (7 - 10). Hilda Patton PA-C  Active   levothyroxine (Synthroid, Levoxyl) 100 mcg tablet [id number] No TAKE 1  TABLET (100 MCG) BY MOUTH ONCE DAILY IN THE MORNING. TAKE BEFORE MEALS. Ezequiel Briseno MD 5/29/2024 Active   losartan (Cozaar) 25 mg tablet [id number] No TAKE 1 TABLET BY MOUTH EVERY DAY Ezequiel Briseno MD 5/30/2024 Active   sennosides-docusate sodium (Dori-Colace) 8.6-50 mg tablet [id number]  Take 1 tablet by mouth once daily for 21 days. Hilda Patton PA-C  Active                    Allergies   Allergen Reactions    Ciprofloxacin Diarrhea       Social History     Socioeconomic History    Marital status:      Spouse name: Not on file    Number of children: Not on file    Years of education: Not on file    Highest education level: Not on file   Occupational History    Not on file   Tobacco Use    Smoking status: Never    Smokeless tobacco: Never   Vaping Use    Vaping status: Never Used   Substance and Sexual Activity    Alcohol use: Not Currently    Drug use: Not Currently    Sexual activity: Not on file   Other Topics Concern    Not on file   Social History Narrative    Not on file     Social Determinants of Health     Financial Resource Strain: Not on file   Food Insecurity: Not on file   Transportation Needs: Not on file   Physical Activity: Not on file   Stress: Not on file   Social Connections: Not on file   Intimate Partner Violence: Not on file   Housing Stability: Not on file       Past Surgical History:   Procedure Laterality Date    BREAST BIOPSY  1980s    HYSTERECTOMY  1997          Review of Systems:  30 point ROS reviewed and negative other than as listed in the HPI     Physical Exam:  Gen: The pt is A&Ox3, NAD, and appear state age and weight  Psychiatric: mood and affect are appropriate   Eyes: sclera are white, EOM grossly intact  ENT: MMM  Neck: supple, thyroid is midline  Respiratory: respirations are nonlabored, chest rise symmetric  CV: rate is regular by palpation of distal pulses  Abdomen: nondistended   Integument: no obvious cutaneous lesions noted. No signs of lymphangitis. No  signs of systemic edema.   MSK: Left elbow surgical incision well healing without edema, erythema, drainage, or other s/sx of infection. Appropriately tender to palpation around the incision. SILT intact throughout axillary, radial, median, and ulnar nerve distributions. Hand warm and well perfused.     Imaging:  I personally reviewed multiple views of the left elbow were obtained in the office today demonstrate maintenance of reduction, interval healing, and a stable position of the hardware.      Assessment   78 y.o. female post-op from open reduction internal fixation left distal humerus fracture on 5/30/2024.     Plan:  Continue nonweightbearing on left upper extremity.  She was transition to a T scope elbow brace at today's visit to allow her range of motion as tolerated.  I provided her with a referral to physical therapy.  Incisions well healing; sutures/staples removed in office and steri's placed with wound care instructed  Continue DVT ppx and vit d/calcium for bone health      Patient was prescribed a T scope for left elbow injury. This mobility device is required for the following reasons:     1. The patient has a mobility limitation that significantly impairs their ability to participate in one or more mobility-related activities of daily living (MRADL) in the home; and  2. The patient is able to safely use the mobility device; and  3. The functional mobility deficit can be sufficiently resolved with use of the mobility device     Verbal and written instructions for the use, wear schedule, cleaning and application of this item were given. Education provided also included gait training and safety precautions when using this device. Patient was instructed that should the item result in increased pain, decreased sensation, increased swelling, or an overall worsening of their medical condition, to please contact our office immediately.      Follow-up 1 month with 2 views left elbow.     All of the patient's  questions/concerns address and they are in agreement with the plan.

## 2024-06-18 NOTE — PROGRESS NOTES
Physical Therapy    Physical Therapy Evaluation and Treatment      Patient Name: Gertrudis Cottrell  MRN: [id number]  Today's Date: 6/19/24  Visit 1  Time Calculation  Start Time: 1350  Stop Time: 1450  Time Calculation (min): 60 min  PT Evaluation Time Entry  PT Evaluation (Moderate) Time Entry: 30    PT Therapeutic Procedures Time Entry  Therapeutic Exercise Time Entry: 30           Assessment:    Patient presents with clinical signs and symptoms  L  elbow  fracture with  ORIF .  Gertrudis Cottrell wearing hinge L elbow brace open fully. These impairments affect ADLs, work, recreational activity, exercise, transfer ability,  and sleep function that requires skilled PT intervention to resolve and enable patient to return to previous level of function. Factors that may affect progress in PT are  fracture healing and patient compliance.  Patient response to initial treatment of  AARBRYAN and  instructed in assist  was understood and performed well.      Plan:  OP PT Plan  Treatment/Interventions: Education/ Instruction, Hot pack, Manual therapy, Self care/ home management, Taping techniques, Therapeutic exercises, Therapeutic activities  PT Plan: Skilled PT  PT Frequency: 2 times per week  Duration: 12  Onset Date: 05/30/24  Certification Period Start Date: 06/19/24  Certification Period End Date: 09/17/24  Rehab Potential: Good  Plan of Care Agreement: Patient    Current Problem:   1. Orthopedic aftercare        2. Closed bicondylar fracture of distal humerus, left, initial encounter  Referral to Physical Therapy          Subjective    Fracture L elbow in fall had ORIF 5/30/24 . Cast off yesterday now in hinge brace.  here to learn how to assist. Patient goes by Adina  Pain:   1-2/10 L hand, feels stiff L elbow  Home Living:   Lives at home with   Prior Level of Function:  Prior Function Per Pt/Caregiver Report  Hand Dominance: Rightindependent ADLs drives car.    Objective   Posterior elbow steri  strips in place , clean and dry  Hinged elbow brace open fully   Cognition:     A+Ox3    Outcome Measures:  Quick Dash 64.65%   ROM  L elbow flexion 88 deg  L  , Extension -50 deg L,  Supination  full L ,  pronation 35 deg  wrist flexion 25 deg   L ,  Extend L 35 , L hand finger flexion severely limited secondary to swelling  L forearm and hand pitting edema: Circumference L elbow L 27.5 cm R 22 c m,  wrist  L  7 cm  R  5 cm,   Knuckles   8 3/4 cm  R 8 cm  Treatments:  There ex : AAROM L elbow extension, flexion and forearm pronation/supination  x10  Towel  x 10  Standing shoulder shrugs x 10 with 5 sec hold    EDUCATION:   extensive education regarding mechanism of injury, relevant functional anatomy, treatment program rational, self management, HEP, and POC   Access Code: [email or web address]  URL: [email or web address]  Date: 06/19/2024  Prepared by: James Roblero    Exercises  - Supported Elbow Flexion Extension PROM  - 3 x daily - 7 x weekly - 2 sets - 10 reps - 5 hold  - Elbow Flexion and Extension Caregiver PROM  - 3 x daily - 7 x weekly - 2 sets - 10 reps - 5 hold  - Forearm Pronation and Supination Caregiver PROM  - 3 x daily - 7 x weekly - 2 sets - 10 reps - 5 hold  - Towel Roll Squeeze  - 3 x daily - 7 x weekly - 2 sets - 10 reps - 5 hold    Goals:  1. Increase   L elbow ext/flex AROM to   -10 to 120 deg,  L forearm pron/supination to 45/90  deg , L wrist and finger AROM to WNL all directions,   2. Increase L elbow strength to  3+ /5 MMT grade, L wrist and  strength to 4/5  3. Decrease  L elbow circumference 3 centimeters and wrist and knuckles by 3/4 cm from baseline measurement  4. Tricia Cottrell will be able to carry 5 lb weight at side 20 ft  5. independent Home exercise program  6. Improve QuickDash score by 20 points

## 2024-06-19 ENCOUNTER — EVALUATION (OUTPATIENT)
Dept: PHYSICAL THERAPY | Facility: CLINIC | Age: 79
End: 2024-06-19
Payer: MEDICARE

## 2024-06-19 DIAGNOSIS — Z47.89 ORTHOPEDIC AFTERCARE: Primary | ICD-10-CM

## 2024-06-19 DIAGNOSIS — S42.492A CLOSED BICONDYLAR FRACTURE OF DISTAL HUMERUS, LEFT, INITIAL ENCOUNTER: ICD-10-CM

## 2024-06-19 PROCEDURE — 97110 THERAPEUTIC EXERCISES: CPT | Mod: GP | Performed by: PHYSICAL THERAPIST

## 2024-06-19 PROCEDURE — 97162 PT EVAL MOD COMPLEX 30 MIN: CPT | Mod: GP | Performed by: PHYSICAL THERAPIST

## 2024-06-19 ASSESSMENT — ENCOUNTER SYMPTOMS
LOSS OF SENSATION IN FEET: 0
OCCASIONAL FEELINGS OF UNSTEADINESS: 0
DEPRESSION: 0

## 2024-06-20 NOTE — PROGRESS NOTES
Physical Therapy    Physical Therapy Evaluation and Treatment      Patient Name: Gertrudis Cottrell  MRN: [id number]  Today's Date: 6/21/24  Visit 2  Time Calculation  Start Time: 0700  Stop Time: 0730  Time Calculation (min): 30 min       PT Therapeutic Procedures Time Entry  Manual Therapy Time Entry: 20  Therapeutic Exercise Time Entry: 10           Assessment:    Gertrudis Cottrell gained elbow extension to -30 deg , shoulder flexion to 95 deg, forearm pronation to 10 deg, and multiple finger flexion to ~ 40 deg each.  Brent michelle good PROM assist. Some post treatment Shoulder discomfort.      Plan:   .Continue POC focus on PROM and AAROM of L UE    Current Problem:   1. Orthopedic aftercare              Subjective     Gertrudis Cottrell was somewhat sore after initial visit. She notes that when L arm elevated the L hand swelling subsides.  Pain:   1-2/10 L hand, feels stiff L elbow      Objective   Posterior elbow steri strips in place , clean and dry  Hinged elbow brace open fully     Treatments:  Manual Therapy:  L elbow joint mob into extension, forearm pronation, wrist flex/ extend, Finger joint mobilization into flexion  Shoulder AAROM into flexion   instructed in finger joint  flexion PROM  There ex :   AAROM L elbow extension, flexion and forearm pronation/supination  x10  Towel  x 10  Standing shoulder shrugs x 10 with 5 sec hold    EDUCATION:    Goals:  1. Increase   L elbow ext/flex AROM to   -10 to 120 deg,  L forearm pron/supination to 45/90  deg , L wrist and finger AROM to WNL all directions,   2. Increase L elbow strength to  3+ /5 MMT grade, L wrist and  strength to 4/5  3. Decrease  L elbow circumference 3 centimeters and wrist and knuckles by 3/4 cm from baseline measurement  4. Gertrudis Cottrell will be able to carry 5 lb weight at side 20 ft  5. independent Home exercise program  6. Improve QuickDash score by 20 points

## 2024-06-21 ENCOUNTER — TREATMENT (OUTPATIENT)
Dept: PHYSICAL THERAPY | Facility: CLINIC | Age: 79
End: 2024-06-21
Payer: MEDICARE

## 2024-06-21 DIAGNOSIS — Z47.89 ORTHOPEDIC AFTERCARE: Primary | ICD-10-CM

## 2024-06-21 DIAGNOSIS — S42.492A CLOSED BICONDYLAR FRACTURE OF DISTAL HUMERUS, LEFT, INITIAL ENCOUNTER: ICD-10-CM

## 2024-06-21 PROCEDURE — 97140 MANUAL THERAPY 1/> REGIONS: CPT | Mod: GP | Performed by: PHYSICAL THERAPIST

## 2024-06-21 PROCEDURE — 97110 THERAPEUTIC EXERCISES: CPT | Mod: GP | Performed by: PHYSICAL THERAPIST

## 2024-06-21 RX ORDER — ASPIRIN 81 MG/1
81 TABLET ORAL 2 TIMES DAILY
Qty: 180 TABLET | Refills: 1 | OUTPATIENT
Start: 2024-06-21 | End: 2024-07-21

## 2024-06-24 DIAGNOSIS — Z00.00 HEALTH CARE MAINTENANCE: ICD-10-CM

## 2024-06-24 NOTE — PROGRESS NOTES
Physical Therapy    Physical Therapy Evaluation and Treatment      Patient Name: Gertrudis Cottrell  MRN: [id number]  Today's Date: 6/25/24  Visit 3  Time Calculation  Start Time: 1640  Stop Time: 1730  Time Calculation (min): 50 min       PT Therapeutic Procedures Time Entry  Manual Therapy Time Entry: 35  Therapeutic Exercise Time Entry: 15           Assessment:    Gertrudis Cottrell demoes better finger flexion ROM. She holds much UQ tension in anticipation/protective muscl guarding that we tried to mitigate with diaphragmatic breathing. L hand swelling and hand color  noticeable better after treatment      Plan:   .Continue POC focus on PROM and AAROM of L UE    Current Problem:   1. Closed fracture of supracondylar humerus, right, initial encounter        2. Orthopedic aftercare  Follow Up In Physical Therapy      3. Closed bicondylar fracture of distal humerus, left, initial encounter  Follow Up In Physical Therapy            Subjective     Gertrudis Cottrell was somewhat sore after initial visit. Some finger and elbow mobility increase perceived She notes that when L arm elevated the L hand swelling subsides.  Pain:   1-2/10 L hand, feels stiff L elbow      Objective   Posterior elbow steri strips in place , clean and dry  Hinged elbow brace open fully     Treatments:  Manual Therapy:  L elbow joint mob into extension, forearm pronation, wrist flex/ extend, Finger joint mobilization into flexion  Shoulder AAROM into flexion and ER/IR  L wrist extension mobilization  MWM all MP,PIPs, and DIPs  finger joint  flexion mobilization  There ex :   Diaphragmatic breathing   AAROM L elbow extension, flexion and forearm pronation/supination  x10  Standing shoulder shrugs x 10 with 5 sec hold    EDUCATION:  Diaphragmatic breath to achieve relaxation response  Goals:  1. Increase   L elbow ext/flex AROM to   -10 to 120 deg,  L forearm pron/supination to 45/90  deg , L wrist and finger AROM to WNL all directions,   2.  Increase L elbow strength to  3+ /5 MMT grade, L wrist and  strength to 4/5  3. Decrease  L elbow circumference 3 centimeters and wrist and knuckles by 3/4 cm from baseline measurement  4. Tricia Cottrell will be able to carry 5 lb weight at side 20 ft  5. independent Home exercise program  6. Improve QuickDash score by 20 points

## 2024-06-25 ENCOUNTER — TREATMENT (OUTPATIENT)
Dept: PHYSICAL THERAPY | Facility: CLINIC | Age: 79
End: 2024-06-25
Payer: MEDICARE

## 2024-06-25 DIAGNOSIS — S42.411A CLOSED FRACTURE OF SUPRACONDYLAR HUMERUS, RIGHT, INITIAL ENCOUNTER: Primary | ICD-10-CM

## 2024-06-25 DIAGNOSIS — S42.492A CLOSED BICONDYLAR FRACTURE OF DISTAL HUMERUS, LEFT, INITIAL ENCOUNTER: ICD-10-CM

## 2024-06-25 DIAGNOSIS — Z47.89 ORTHOPEDIC AFTERCARE: ICD-10-CM

## 2024-06-25 PROCEDURE — 97110 THERAPEUTIC EXERCISES: CPT | Mod: GP | Performed by: PHYSICAL THERAPIST

## 2024-06-25 PROCEDURE — 97140 MANUAL THERAPY 1/> REGIONS: CPT | Mod: GP | Performed by: PHYSICAL THERAPIST

## 2024-06-27 RX ORDER — ATORVASTATIN CALCIUM 10 MG/1
10 TABLET, FILM COATED ORAL NIGHTLY
Qty: 90 TABLET | Refills: 0 | Status: SHIPPED | OUTPATIENT
Start: 2024-06-27

## 2024-06-27 RX ORDER — LEVOTHYROXINE SODIUM 100 UG/1
100 TABLET ORAL
Qty: 90 TABLET | Refills: 0 | Status: SHIPPED | OUTPATIENT
Start: 2024-06-27

## 2024-06-27 RX ORDER — LOSARTAN POTASSIUM 25 MG/1
25 TABLET ORAL DAILY
Qty: 90 TABLET | Refills: 0 | Status: SHIPPED | OUTPATIENT
Start: 2024-06-27

## 2024-06-28 ENCOUNTER — APPOINTMENT (OUTPATIENT)
Dept: PRIMARY CARE | Facility: CLINIC | Age: 79
End: 2024-06-28
Payer: MEDICARE

## 2024-06-28 DIAGNOSIS — Z23 ENCOUNTER FOR IMMUNIZATION: Primary | ICD-10-CM

## 2024-06-28 PROCEDURE — 96372 THER/PROPH/DIAG INJ SC/IM: CPT | Performed by: INTERNAL MEDICINE

## 2024-06-28 RX ORDER — CYANOCOBALAMIN 1000 UG/ML
1000 INJECTION, SOLUTION INTRAMUSCULAR; SUBCUTANEOUS ONCE
Status: COMPLETED | OUTPATIENT
Start: 2024-06-28 | End: 2024-06-28

## 2024-07-01 NOTE — PROGRESS NOTES
Physical Therapy    Physical Therapy Evaluation and Treatment      Patient Name: Gertrudis Cottrell  MRN: [id number]  Today's Date: 7/2/24  Visit 4  Time Calculation  Start Time: 1810  Stop Time: 1905  Time Calculation (min): 55 min       PT Therapeutic Procedures Time Entry  Manual Therapy Time Entry: 30  Therapeutic Exercise Time Entry: 25           Assessment:    Gertrudis Cottrell has gain L uE head tap motor control in supine and standing . Her L  hand is observabley less swollen an d the color is less dusky. Post treatment elbow flexion to 95 deg.    Plan:   .Continue POC focus on manual edema reduction PROM and AAROM of L UE. Continue to observe for signs reflex sympathetic dystrophy     Current Problem:   1. Orthopedic aftercare              Subjective     Gertrudis Cottrell notes L forearm and hand swelling down.  helping with elbow and finger mobility  Pain:   1-2/10 L hand, feels stiff L elbow, ulnar lateral hand and 5 th digit tingling      Objective   Posterior elbow steri strips all off now  Gertrudis Cottrell entered clinic without brace on    Treatments:  Manual Therapy:  L elbow joint mob into extension, forearm supinatiion with posterior radial head glide, wrist flex/ extend, all Finger joint mobilization into flexion  Shoulder AAROM to AROM into flexion    L wrist extension mobilization  MWM all MP,PIPs, and DIPs  finger joint  flexion mobilization  Edema reduction massage lateral elbow and forearm   There ex :   Supine head tap 2 x 10  Standing head tap 1x10  Coordinated pillow slide including hip hinge/ practice the movement pattern  AAROM L elbow extension, flexion and forearm pronation/supination  x10  Bent over arm dangle to get  L UQ relaxation and allow elbow ro extend with help of gravity    EDUCATION:  Instructed  Brent in edema reduction massage L lateral elbow and forearm  Goals:  1. Increase   L elbow ext/flex AROM to   -10 to 120 deg,  L forearm pron/supination to  45/90  deg , L wrist and finger AROM to WNL all directions,   2. Increase L elbow strength to  3+ /5 MMT grade, L wrist and  strength to 4/5  3. Decrease  L elbow circumference 3 centimeters and wrist and knuckles by 3/4 cm from baseline measurement  4. Tricia Cottrell will be able to carry 5 lb weight at side 20 ft  5. independent Home exercise program  6. Improve QuickDash score by 20 points

## 2024-07-02 ENCOUNTER — TREATMENT (OUTPATIENT)
Dept: PHYSICAL THERAPY | Facility: CLINIC | Age: 79
End: 2024-07-02
Payer: MEDICARE

## 2024-07-02 DIAGNOSIS — Z47.89 ORTHOPEDIC AFTERCARE: Primary | ICD-10-CM

## 2024-07-02 PROCEDURE — 97140 MANUAL THERAPY 1/> REGIONS: CPT | Mod: GP | Performed by: PHYSICAL THERAPIST

## 2024-07-02 PROCEDURE — 97110 THERAPEUTIC EXERCISES: CPT | Mod: GP | Performed by: PHYSICAL THERAPIST

## 2024-07-05 ENCOUNTER — PATIENT MESSAGE (OUTPATIENT)
Dept: ORTHOPEDIC SURGERY | Facility: CLINIC | Age: 79
End: 2024-07-05
Payer: MEDICARE

## 2024-07-05 DIAGNOSIS — D51.9 ANEMIA DUE TO VITAMIN B12 DEFICIENCY, UNSPECIFIED B12 DEFICIENCY TYPE: ICD-10-CM

## 2024-07-05 DIAGNOSIS — S42.411A CLOSED FRACTURE OF SUPRACONDYLAR HUMERUS, RIGHT, INITIAL ENCOUNTER: Primary | ICD-10-CM

## 2024-07-08 ENCOUNTER — APPOINTMENT (OUTPATIENT)
Dept: PHYSICAL THERAPY | Facility: CLINIC | Age: 79
End: 2024-07-08
Payer: MEDICARE

## 2024-07-08 RX ORDER — GABAPENTIN 300 MG/1
300 CAPSULE ORAL 3 TIMES DAILY
Qty: 90 CAPSULE | Refills: 0 | Status: SHIPPED | OUTPATIENT
Start: 2024-07-08 | End: 2024-08-07

## 2024-07-09 NOTE — PROGRESS NOTES
"Physical Therapy    Physical Therapy Treatment    Patient Name: Gertrudis Cottrell  MRN: [id number]    Today's Date: 7/10/2024  Time Calculation  Start Time: 1305  Stop Time: 1350  Time Calculation (min): 45 min     PT Therapeutic Procedures Time Entry  Manual Therapy Time Entry: 20  Therapeutic Exercise Time Entry: 25     Insurance : Medicare/[employer]      Authorization /Certification / Visits allowed: med necc   Visit 7      Assessment:  Expressed better understanding of gently pushing ROM and STM for improved AROM and normalizing color and edema in L forearm and hand. Able to increase ecc AROM with R UE assist for head tap.   Plan:  Continue w/ elbow end range mobility, continue w/ medial and ulnar nerve glides, add ball on wall and PNF patterns, as tolerated. Check L UE coloration and sensitivity.     Current Problem    Problem List Items Addressed This Visit             ICD-10-CM    Orthopedic aftercare - Primary Z47.89       Subjective    Expressed that she is still anxious re: \"the hardware\" during manual at home and here. Compliant w/ HEP w/ help of her , Fanny.   Precautions  Osteoporosis--taking Previa   Pain  1-2/10 in hand and elbow     Objective   Pitting edema (~grade1) at elbow incision with adherence to underlying tissue   Darker pink mottled color L forearm--> fingers ; only hand after manual       Treatments:  Manual Therapy:  Deep-->light pressure STM /desensitization elbow to DIP joints   Elbow scar massage   L elbow joint mob into extension, forearm supinatiion with posterior radial head glide, wrist flex/ extend, all Finger joint mobilization into flexion  Shoulder AAROM to AROM into flexion    L wrist extension mobilization--not today   MWM all MP,PIPs, and DIPs  finger joint  flexion mobilization  Edema reduction massage lateral elbow and forearm   There ex :   Towel /extend all digits w/ clinician assist for increased flexion   Seated head tap  x 10; x 10  ecc  (R UE assist to " increased end range)  Standing head tap 1x10--not today   Coordinated pillow slide including hip hinge/ practice the movement pattern--not today   Gravity assisted sustained extension stretch over 5--->3 towels x 5 min  AAROM L elbow extension, flexion and forearm pronation/supination  x10  Prayer stretch x 5 up/down, x 5 L/R   Bent over arm dangle to get  L UQ relaxation and allow elbow ro extend with help of gravity--not today  OP EDUCATION:  Densitization techniques   Scar massage   Self ROM     Goals:

## 2024-07-10 ENCOUNTER — TREATMENT (OUTPATIENT)
Dept: PHYSICAL THERAPY | Facility: CLINIC | Age: 79
End: 2024-07-10
Payer: MEDICARE

## 2024-07-10 DIAGNOSIS — Z47.89 ORTHOPEDIC AFTERCARE: Primary | ICD-10-CM

## 2024-07-10 PROCEDURE — 97110 THERAPEUTIC EXERCISES: CPT | Mod: GP,CQ | Performed by: PHYSICAL THERAPY ASSISTANT

## 2024-07-10 PROCEDURE — 97140 MANUAL THERAPY 1/> REGIONS: CPT | Mod: GP,CQ | Performed by: PHYSICAL THERAPY ASSISTANT

## 2024-07-10 RX ORDER — CYANOCOBALAMIN 1000 UG/ML
INJECTION, SOLUTION INTRAMUSCULAR; SUBCUTANEOUS
Qty: 3 ML | Refills: 1 | Status: SHIPPED | OUTPATIENT
Start: 2024-07-10

## 2024-07-15 ENCOUNTER — TREATMENT (OUTPATIENT)
Dept: PHYSICAL THERAPY | Facility: CLINIC | Age: 79
End: 2024-07-15
Payer: MEDICARE

## 2024-07-15 DIAGNOSIS — Z47.89 ORTHOPEDIC AFTERCARE: ICD-10-CM

## 2024-07-15 DIAGNOSIS — S42.492A CLOSED BICONDYLAR FRACTURE OF DISTAL HUMERUS, LEFT, INITIAL ENCOUNTER: ICD-10-CM

## 2024-07-15 PROCEDURE — 97110 THERAPEUTIC EXERCISES: CPT | Mod: GP,CQ | Performed by: PHYSICAL THERAPY ASSISTANT

## 2024-07-15 PROCEDURE — 97140 MANUAL THERAPY 1/> REGIONS: CPT | Mod: GP,CQ | Performed by: PHYSICAL THERAPY ASSISTANT

## 2024-07-15 NOTE — PROGRESS NOTES
Physical Therapy    Physical Therapy Treatment    Patient Name: Gertrudis Cottrell  MRN: [id number]    Today's Date: 7/15/2024  Time Calculation  Start Time: 1430  Stop Time: 1515  Time Calculation (min): 45 min     PT Therapeutic Procedures Time Entry  Manual Therapy Time Entry: 15  Therapeutic Exercise Time Entry: 30     Insurance : Medicare/Aetna      Authorization /Certification / Visits allowed: med necc   Visit 7      Assessment:  Improved functional use during the session without c/o of more than intermittent lateral palm and 5th digit tingles.  Noted she holds her digits in full extension and elevated for edema control. Asked her to alternate between edema control and now add passive elbow extension through out the day. Recommended she wear a glove taped to a tennis ball as a DIY resting splint.   Plan:  Continue w/ elbow end range mobility, continue w/ medial and ulnar nerve glides, add PNF patterns, as tolerated.     Current Problem    Problem List Items Addressed This Visit             ICD-10-CM    Orthopedic aftercare Z47.89     Other Visit Diagnoses         Codes    Closed bicondylar fracture of distal humerus, left, initial encounter     S42.492A            Subjective    Feeling more confident w/ using her L UE with ADLs: washing windows, holding items---. Focused on edema control and holds hand at eye level most of the time.   Precautions  Osteoporosis--taking Previa   Pain  1-2/10 in hand and elbow     Objective   Edema only at elbow incision with adherence to underlying tissue   Improved color and edema in hand and L forearm      Treatments:  Manual Therapy:  L elbow joint mob into extension, forearm supinatiion with posterior radial head glide, wrist flex/ extend, all Finger joint mobilization into flexion  Shoulder AROM into flexion    L wrist extension mobilization--not today   MWM all MP,PIPs, and DIPs  finger joint  flexion mobilization  Passive elbow extension w/ wt of clinician's hand x 5 min  (-50 deg lacking extension )  There ex :   Gravity assisted  sustained extension stretch in standing x 1 min x 3 reps   Towel /extend all digits w/ clinician assist for increased flexion   Seated head tap 2  x 10  ecc  (R UE assist to increased end range)  Passive  around tennis ball x 2 min (simulate glove taped to ball for HEP)  Yellow clothespin stacking on post 12 pins x 2 (to/from post -- assist w/ R UE for placing)    webbing tool x 5 reps ; too difficult for thumb and 4-5th digit   Ball on wall (up/down, lateral, circles cw/ccw) x 20 reps x 2 sets each   Door washing B UE w/ towel : (up/down, lateral, circles cw/ccw) x 10 reps each   Prayer stretch x 5 up/down, x 5 L/R     Not today :   Standing head tap 1x10  Coordinated pillow slide including hip hinge/ practice the movement pattern  AAROM L elbow extension, flexion and forearm pronation/supination  x10  Bent over arm dangle to get  L UQ relaxation and allow elbow ro extend with help of gravity  OP EDUCATION:  Densitization techniques   Scar massage   Self ROM     Goals:

## 2024-07-16 ENCOUNTER — HOSPITAL ENCOUNTER (OUTPATIENT)
Dept: RADIOLOGY | Facility: CLINIC | Age: 79
Discharge: HOME | End: 2024-07-16
Payer: MEDICARE

## 2024-07-16 ENCOUNTER — OFFICE VISIT (OUTPATIENT)
Dept: ORTHOPEDIC SURGERY | Facility: CLINIC | Age: 79
End: 2024-07-16
Payer: MEDICARE

## 2024-07-16 VITALS — BODY MASS INDEX: 23.73 KG/M2 | WEIGHT: 139 LBS | HEIGHT: 64 IN

## 2024-07-16 DIAGNOSIS — S42.411A CLOSED FRACTURE OF SUPRACONDYLAR HUMERUS, RIGHT, INITIAL ENCOUNTER: Primary | ICD-10-CM

## 2024-07-16 DIAGNOSIS — S42.411A CLOSED FRACTURE OF SUPRACONDYLAR HUMERUS, RIGHT, INITIAL ENCOUNTER: ICD-10-CM

## 2024-07-16 PROCEDURE — 99211 OFF/OP EST MAY X REQ PHY/QHP: CPT | Performed by: PHYSICIAN ASSISTANT

## 2024-07-16 PROCEDURE — 1159F MED LIST DOCD IN RCRD: CPT | Performed by: PHYSICIAN ASSISTANT

## 2024-07-16 PROCEDURE — 73070 X-RAY EXAM OF ELBOW: CPT | Mod: LEFT SIDE | Performed by: RADIOLOGY

## 2024-07-16 PROCEDURE — 1036F TOBACCO NON-USER: CPT | Performed by: PHYSICIAN ASSISTANT

## 2024-07-16 PROCEDURE — 73070 X-RAY EXAM OF ELBOW: CPT | Mod: LT

## 2024-07-16 PROCEDURE — 1125F AMNT PAIN NOTED PAIN PRSNT: CPT | Performed by: PHYSICIAN ASSISTANT

## 2024-07-16 RX ORDER — GABAPENTIN 600 MG/1
600 TABLET ORAL 3 TIMES DAILY
Qty: 90 TABLET | Refills: 0 | Status: SHIPPED | OUTPATIENT
Start: 2024-07-16 | End: 2024-08-15

## 2024-07-16 ASSESSMENT — PAIN SCALES - GENERAL: PAINLEVEL_OUTOF10: 3

## 2024-07-16 ASSESSMENT — PAIN - FUNCTIONAL ASSESSMENT: PAIN_FUNCTIONAL_ASSESSMENT: 0-10

## 2024-07-16 ASSESSMENT — PAIN DESCRIPTION - DESCRIPTORS: DESCRIPTORS: ACHING

## 2024-07-16 NOTE — PROGRESS NOTES
Chief Complaint   Patient presents with    Follow-up       1. \"Have you been to the ER, urgent care clinic since your last visit? Hospitalized since your last visit? \" No    2. \"Have you seen or consulted any other health care providers outside of the [address] [place] [address] since your last visit? \" No     3. For patients aged 39-70: Has the patient had a colonoscopy / FIT/ Cologuard? NA - based on age      If the patient is female:    4. For patients aged 41-77: Has the patient had a mammogram within the past 2 years? NA - based on age or sex      11. For patients aged 21-65: Has the patient had a pap smear?  Yes - no Care Gap present History of Present Illness   Gertrudis Cottrell is a 78 y.o. female presenting today for post-op check from [facility] left distal humerus fracture on 5/30/2024.  Continues to do well.  Main complaint is a burning pain in the radial distribution in her hand which seems to be partially controlled with gabapentin.  Has been working with therapy and feels her range of motion is improving.  Continues to have intermittent swelling in the hand that seems to improve with massage, range of motion exercises, and elevation.  Incision is well-healed.  No other complaints or concerns.     Past Medical History:   Diagnosis Date    Anemia     Hyperlipidemia     Hypertension     Hypothyroidism     PONV (postoperative nausea and vomiting)        Medication Documentation Review Audit       Reviewed by Ramírez Sheppard on 07/16/24 at 1024      Medication Order Taking? Sig Documenting Provider Last Dose Status   atorvastatin (Lipitor) 10 mg tablet [id number]  TAKE 1 TABLET (10 MG) BY MOUTH ONCE DAILY AT BEDTIME. Ezequiel Briseno MD  Active   calcium carbonate/vitamin D3 (CALCIUM + D ORAL) [id number] No Take by mouth. Historical Provider, MD Past Week Active   Discontinued 07/10/24 2210   cyanocobalamin (Vitamin B-12) 1,000 mcg/mL injection [id number]  INJECT 1ML IN THE MUSCLE ONCE A MONTH Ezequiel Briseno MD  Active   cyanocobalamin (Vitamin B-12) injection 1,000 mcg [id number]   Ezequiel Briseno MD  Active   denosumab (Prolia) 60 mg/mL syringe [id number] No Inject 1 mL (60 mg total) under the skin every 6 months. Ezequiel Briseno MD Past Week Active   gabapentin (Neurontin) 300 mg capsule [id number]  Take 1 capsule (300 mg) by mouth 3 times a day. Hilda Patton PA-C  Active   HYDROcodone-acetaminophen (Norco) 5-325 mg tablet [id number]  Take 1 tablet by mouth every 6 hours if needed for severe pain (7 - 10). Hilda Patton PA-C  Active   levothyroxine (Synthroid, Levoxyl) 100 mcg tablet [id number]  TAKE 1 TABLET (100 MCG) BY MOUTH ONCE DAILY  IN THE MORNING. TAKE BEFORE MEALS. Ezequiel Briseno MD  Active   losartan (Cozaar) 25 mg tablet [id number]  TAKE 1 TABLET BY MOUTH EVERY DAY Ezequiel Briseno MD  Active                    Allergies   Allergen Reactions    Ciprofloxacin Diarrhea       Social History     Socioeconomic History    Marital status:      Spouse name: Not on file    Number of children: Not on file    Years of education: Not on file    Highest education level: Not on file   Occupational History    Not on file   Tobacco Use    Smoking status: Never    Smokeless tobacco: Never   Vaping Use    Vaping status: Never Used   Substance and Sexual Activity    Alcohol use: Not Currently    Drug use: Not Currently    Sexual activity: Not on file   Other Topics Concern    Not on file   Social History Narrative    Not on file     Social Determinants of Health     Financial Resource Strain: Not on file   Food Insecurity: Not on file   Transportation Needs: Not on file   Physical Activity: Not on file   Stress: Not on file   Social Connections: Not on file   Intimate Partner Violence: Not on file   Housing Stability: Not on file       Past Surgical History:   Procedure Laterality Date    BREAST BIOPSY  1980s    HYSTERECTOMY  1997          Review of Systems:  30 point ROS reviewed and negative other than as listed in the HPI     Physical Exam:  Gen: The pt is A&Ox3, NAD, and appear state age and weight  Psychiatric: mood and affect are appropriate   Eyes: sclera are white, EOM grossly intact  ENT: MMM  Neck: supple, thyroid is midline  Respiratory: respirations are nonlabored, chest rise symmetric  CV: rate is regular by palpation of distal pulses  Abdomen: nondistended   Integument: no obvious cutaneous lesions noted. No signs of lymphangitis. No signs of systemic edema.   MSK: Left elbow surgical incision well healing without edema, erythema, drainage, or other s/sx of infection. Appropriately tender to palpation around the incision. SILT intact  throughout axillary, radial, median, and ulnar nerve distributions. Hand warm and well perfused.     Imaging:  I personally reviewed multiple views of the left elbow were obtained in the office today demonstrate maintenance of reduction, interval healing, and a stable position of the hardware.      Assessment   78 y.o. female post-op from open reduction internal fixation left distal humerus fracture on 5/30/2024.     Plan:  She can weight-bear up to 5 pounds on the left upper extremity today.  She can wear her brace as needed.  She can do range of motion as tolerated and is okay to continue to push her flexion and extension.  She should continue targeted hand exercises as well.  We discussed scar care.    We will increase her gabapentin at this time.  She will keep me posted on how it helps with pain control as I informed her that we could increase the dose even from here as well.     Follow-up 6 weeks with 2 views left elbow.     All of the patient's questions/concerns address and they are in agreement with the plan.

## 2024-07-17 ENCOUNTER — TREATMENT (OUTPATIENT)
Dept: PHYSICAL THERAPY | Facility: CLINIC | Age: 79
End: 2024-07-17
Payer: MEDICARE

## 2024-07-17 DIAGNOSIS — S42.492A CLOSED BICONDYLAR FRACTURE OF DISTAL HUMERUS, LEFT, INITIAL ENCOUNTER: ICD-10-CM

## 2024-07-17 DIAGNOSIS — Z47.89 ORTHOPEDIC AFTERCARE: ICD-10-CM

## 2024-07-17 PROCEDURE — 97110 THERAPEUTIC EXERCISES: CPT | Mod: GP,CQ | Performed by: PHYSICAL THERAPY ASSISTANT

## 2024-07-17 PROCEDURE — 97140 MANUAL THERAPY 1/> REGIONS: CPT | Mod: GP,CQ | Performed by: PHYSICAL THERAPY ASSISTANT

## 2024-07-17 NOTE — PROGRESS NOTES
Physical Therapy    Physical Therapy Treatment    Patient Name: Gertrudis Cottrell  MRN: [id number]    Today's Date: 7/17/2024  Time Calculation  Start Time: 1300  Stop Time: 1400  Time Calculation (min): 60 min     PT Therapeutic Procedures Time Entry  Manual Therapy Time Entry: 15  Therapeutic Exercise Time Entry: 40     Insurance : Medicare/Aetna      Authorization /Certification / Visits allowed: med necc   Visit 7      Assessment:  Nice progression w/o c/o with increased active use of L UE. Tactile cues for trunk rotation and subsequent recip UE swing w/ fast walking. Encouraged her to progress glove/ball splint from 1 hr on/off schedule to all day but taking it off to actively use B hands for ADLs, etc.   Plan:  Continue w/ active use of L UE : UBE > 40 rpm, walking fast w/ #2 ankle wts at wrist , cone stacking OH shelves,   add resistance to PNF patterns, as tolerated.     Current Problem    Problem List Items Addressed This Visit             ICD-10-CM    Orthopedic aftercare Z47.89     Other Visit Diagnoses         Codes    Closed bicondylar fracture of distal humerus, left, initial encounter     S42.492A            Subjective    Good news from the PA: pleased with her progress; she increased her Gabapentin and suggested vitamin E strips. Found the glove/ball resting splint remarkably helpful and her entire arm felt better.   Precautions  Osteoporosis--taking Previa   Pain  0-1/10 in hand and elbow     Objective   Supports her L UE w/ R UE at 90 deg L elbow flexion at rest     Treatments:  Manual Therapy:  L elbow joint mob into extension, forearm supination , wrist flex/ extend, all Finger joint mobilization into flexion  IASTM biceps   Shoulder AROM into flexion    L wrist extension mobilization  MWM all MP,PIPs, and DIPs  finger joint  flexion mobilization  Passive elbow extension  (55 deg lacking extension )  There ex :   UBE 2.5'/2.5' standing at 40 RPM   Standing D1 and D2 PNF patterns w/ AA for  increase endrange flexion and for increased supination/pronation   Amb #1 ankle wt at wrist 50' x 4 fast pace w/ recip UE swing   Yellow /red clothespin stacking on post 12 pins x 2 each (to/from post -- assist w/ R UE for red)   Yellow Theraputty /extend all digits w/ assist from R UE for increased flexion   Ball on wall (up/down, lateral, circles cw/ccw) x 20 reps x 2 sets each         Not today :   Door washing B UE w/ towel : (up/down, lateral, circles cw/ccw) x 10 reps each   Prayer stretch x 5 up/down, x 5 L/R   Standing head tap 1x10  Coordinated pillow slide including hip hinge/ practice the movement pattern  AAROM L elbow extension, flexion and forearm pronation/supination  x10  Bent over arm dangle to get  L UQ relaxation and allow elbow ro extend with help of gravity  OP EDUCATION:  Densitization techniques   Scar massage   Self ROM     Goals:  1. Increase   L elbow ext/flex AROM to   -10 to 120 deg,  L forearm pron/supination to 45/90  deg , L wrist and finger AROM to WNL all directions,   2. Increase L elbow strength to  3+ /5 MMT grade, L wrist and  strength to 4/5  3. Decrease  L elbow circumference 3 centimeters and wrist and knuckles by 3/4 cm from baseline measurement  4. Tricia Cottrell will be able to carry 5 lb weight at side 20 ft  5. independent Home exercise program  6. Improve QuickDash score by 20 points

## 2024-07-22 ENCOUNTER — TREATMENT (OUTPATIENT)
Dept: PHYSICAL THERAPY | Facility: CLINIC | Age: 79
End: 2024-07-22
Payer: MEDICARE

## 2024-07-22 ENCOUNTER — APPOINTMENT (OUTPATIENT)
Dept: INFUSION THERAPY | Facility: CLINIC | Age: 79
End: 2024-07-22
Payer: MEDICARE

## 2024-07-22 DIAGNOSIS — S42.492A CLOSED BICONDYLAR FRACTURE OF DISTAL HUMERUS, LEFT, INITIAL ENCOUNTER: ICD-10-CM

## 2024-07-22 DIAGNOSIS — Z47.89 ORTHOPEDIC AFTERCARE: ICD-10-CM

## 2024-07-22 PROCEDURE — 97110 THERAPEUTIC EXERCISES: CPT | Mod: GP,CQ | Performed by: PHYSICAL THERAPY ASSISTANT

## 2024-07-22 PROCEDURE — 97140 MANUAL THERAPY 1/> REGIONS: CPT | Mod: GP,CQ | Performed by: PHYSICAL THERAPY ASSISTANT

## 2024-07-22 NOTE — PROGRESS NOTES
Physical Therapy    Physical Therapy Treatment    Patient Name: Gertrudis Cottrell  MRN: [id number]    Today's Date: 7/22/2024  Time Calculation  Start Time: 1345  Stop Time: 1430  Time Calculation (min): 45 min     PT Therapeutic Procedures Time Entry  Manual Therapy Time Entry: 10  Therapeutic Exercise Time Entry: 35     Insurance : Medicare/Aetna      Authorization /Certification / Visits allowed: [facility]   Visit 8      Assessment:  Tolerated increased ROM stretching w/ added wts for supination/pronation and elbow extension. D/w PT, patient and spouse re: dynamic splinting.   Plan:  Continue w/ active use of L UE : UBE > 40 rpm, walking fast w/ #3 ankle wts at wrist , add cone stacking OH shelves,   add resistance to PNF patterns, as tolerated.     Current Problem    Problem List Items Addressed This Visit             ICD-10-CM    Orthopedic aftercare Z47.89     Other Visit Diagnoses         Codes    Closed bicondylar fracture of distal humerus, left, initial encounter     S42.492A              Subjective    Walking 10-15 min w/ ankle wt and produced pain . Having intermittent nerve pain but it is getting better.  Precautions  Osteoporosis--taking Previa   Pain  2-3/10 in hand and elbow     Objective   Distal humerus TTP     Treatments:  Manual Therapy:  L elbow joint mob into extension, forearm supination     Not today:   wrist flex/ extend, all Finger joint mobilization into flexion  IASTM biceps   Shoulder AROM into flexion    L wrist extension mobilization  MWM all MP,PIPs, and DIPs  finger joint  flexion mobilization  Passive elbow extension  (55 deg lacking extension )  There ex :   UBE 2.5'/2.5' standing at 40 RPM   Standing D1 and D2 PNF patterns w/ AA for increase endrange flexion and for increased supination/pronation   Amb #3 ankle wt at wrist 50' x 2 fast pace w/ recip UE swing   Yellow /red clothespin stacking on post 12 pins x 2 each (to/from post -- assist w/ R UE for red) --not today  Yellow  Theraputty /extend all digits w/ assist from R UE for increased flexion   Ball on wall (up/down, lateral, circles cw/ccw) x 20 reps x 2 sets each         Not today :   Door washing B UE w/ towel : (up/down, lateral, circles cw/ccw) x 10 reps each   Prayer stretch x 5 up/down, x 5 L/R   Standing head tap 1x10  Coordinated pillow slide including hip hinge/ practice the movement pattern  AAROM L elbow extension, flexion and forearm pronation/supination  x10  Bent over arm dangle to get  L UQ relaxation and allow elbow ro extend with help of gravity  OP EDUCATION:  Densitization techniques   Scar massage   Self ROM     Goals:  1. Increase   L elbow ext/flex AROM to   -10 to 120 deg,  L forearm pron/supination to 45/90  deg , L wrist and finger AROM to WNL all directions,   2. Increase L elbow strength to  3+ /5 MMT grade, L wrist and  strength to 4/5  3. Decrease  L elbow circumference 3 centimeters and wrist and knuckles by 3/4 cm from baseline measurement  4. Tricia Cottrell will be able to carry 5 lb weight at side 20 ft  5. independent Home exercise program  6. Improve QuickDash score by 20 points

## 2024-07-24 ENCOUNTER — TREATMENT (OUTPATIENT)
Dept: PHYSICAL THERAPY | Facility: CLINIC | Age: 79
End: 2024-07-24
Payer: MEDICARE

## 2024-07-24 DIAGNOSIS — Z47.89 ORTHOPEDIC AFTERCARE: ICD-10-CM

## 2024-07-24 DIAGNOSIS — S42.492A CLOSED BICONDYLAR FRACTURE OF DISTAL HUMERUS, LEFT, INITIAL ENCOUNTER: ICD-10-CM

## 2024-07-24 PROCEDURE — 97110 THERAPEUTIC EXERCISES: CPT | Mod: GP,CQ | Performed by: PHYSICAL THERAPY ASSISTANT

## 2024-07-24 NOTE — PROGRESS NOTES
Physical Therapy    Physical Therapy Treatment    Patient Name: Gertrudis Cottrell  MRN: [id number]    Today's Date: 7/24/2024  1p-145p        PT Therapeutic Procedures Time Entry  Therapeutic Exercise Time Entry: 40     Insurance : Medicare/Aetna      Authorization /Certification / Visits allowed: [facility]   Visit 9      Assessment:  Improved elbow extension w/o symptoms . Dynamic splinting request in process.; awaiting MD Rx.  .   Plan:  Re-eval.  Add cone stacking OH shelves,   add ankle wt at wrist to PNF patterns, as tolerated.     Current Problem    Problem List Items Addressed This Visit             ICD-10-CM    Orthopedic aftercare Z47.89     Other Visit Diagnoses         Codes    Closed bicondylar fracture of distal humerus, left, initial encounter     S42.492A              Subjective    Having company and will not be seen BID and asked to review HEP today . Getting better with fewer nerve symptoms and improving functional motion in entire L UE.     Precautions  Osteoporosis--taking Previa   Pain  1-3/10 in hand and elbow     Objective   Elbow extension lacking 42 deg     Treatments:  Manual Therapy:  L elbow joint mob into extension, forearm supination     Not today:   wrist flex/ extend, all Finger joint mobilization into flexion/extension   Shoulder AROM into flexion    L wrist extension mobilization  Passive elbow extension  (42 deg lacking extension )  There ex :   UBE 2.5'/2.5' standing at 45 RPM   Standing D1 and D2 PNF patterns w/ AA for increase endrange flexion and for increased supination/pronation --YTB attempted ut unable   Prayer stretch x 5 reps up/down, x 5 reps L/R   Shoulder flexion assist to end range ,,ecc  2# 2 x 10   Biceps curls 1#x 10 reps, 2# 2 x 10   Hammer supination / pronation 5 sec hold 2 x 10 each w/light assist at 3rd+4th digits to maintain     Not today:   Amb #3 ankle wt at wrist 50' x 2 fast pace w/ recip UE swing   Yellow /red clothespin stacking on post 12 pins x 2  each (to/from post -- assist w/ R UE for red) --not today  Yellow Theraputty /extend all digits w/ assist from R UE for increased flexion   Ball on wall (up/down, lateral, circles cw/ccw) x 20 reps x 2 sets each   Door washing B UE w/ towel : (up/down, lateral, circles cw/ccw) x 10 reps each     OP EDUCATION:  Densitization techniques   Scar massage   Self ROM   Shoulder flexion  Prayer stretch  Biceps curls   Biceps passive stretching   Walking w/ wrist wt  Hammer sup/pronation   D1D2 patterns   Goals:  1. Increase   L elbow ext/flex AROM to   -10 to 120 deg,  L forearm pron/supination to 45/90  deg , L wrist and finger AROM to WNL all directions,   2. Increase L elbow strength to  3+ /5 MMT grade, L wrist and  strength to 4/5  3. Decrease  L elbow circumference 3 centimeters and wrist and knuckles by 3/4 cm from baseline measurement  4. Tricia Cottrell will be able to carry 5 lb weight at side 20 ft  5. independent Home exercise program  6. Improve QuickDash score by 20 points

## 2024-07-25 DIAGNOSIS — S42.492A CLOSED BICONDYLAR FRACTURE OF DISTAL HUMERUS, LEFT, INITIAL ENCOUNTER: Primary | ICD-10-CM

## 2024-07-30 ENCOUNTER — TREATMENT (OUTPATIENT)
Dept: PHYSICAL THERAPY | Facility: CLINIC | Age: 79
End: 2024-07-30
Payer: MEDICARE

## 2024-07-30 ENCOUNTER — APPOINTMENT (OUTPATIENT)
Dept: PRIMARY CARE | Facility: CLINIC | Age: 79
End: 2024-07-30
Payer: MEDICARE

## 2024-07-30 DIAGNOSIS — E53.8 B12 DEFICIENCY: Primary | ICD-10-CM

## 2024-07-30 DIAGNOSIS — S42.411A CLOSED FRACTURE OF SUPRACONDYLAR HUMERUS, RIGHT, INITIAL ENCOUNTER: Primary | ICD-10-CM

## 2024-07-30 DIAGNOSIS — S42.492A CLOSED BICONDYLAR FRACTURE OF DISTAL HUMERUS, LEFT, INITIAL ENCOUNTER: ICD-10-CM

## 2024-07-30 DIAGNOSIS — Z00.00 HEALTH CARE MAINTENANCE: ICD-10-CM

## 2024-07-30 DIAGNOSIS — Z47.89 ORTHOPEDIC AFTERCARE: ICD-10-CM

## 2024-07-30 PROCEDURE — 97110 THERAPEUTIC EXERCISES: CPT | Mod: GP,CQ | Performed by: PHYSICAL THERAPY ASSISTANT

## 2024-07-30 NOTE — PROGRESS NOTES
Physical Therapy    Physical Therapy Treatment    Patient Name: Gertrudis Cottrell  MRN: [id number]    Today's Date: 7/30/2024  1p-145p  Time Calculation  Start Time: 0900  Stop Time: 1000  Time Calculation (min): 60 min     PT Therapeutic Procedures Time Entry  Therapeutic Exercise Time Entry: 40     Insurance : Medicare/Aetna      Authorization /Certification / Visits allowed: [facility]   Visit 10      Assessment:  Functionally improving with ADLs including stacking today. Finger flexion and  is lagging , espeically 4th and 5th digits . D/w PT and we agree she transition to a hand specialist to continue elbow/shoulder and hand therapy.   Plan:  Check on hand specialist OT appointment. We will follow in PT until OT UE specialist is scheduled to continue progress    Current Problem    Problem List Items Addressed This Visit             ICD-10-CM    Orthopedic aftercare Z47.89     Other Visit Diagnoses         Codes    Closed bicondylar fracture of distal humerus, left, initial encounter     S42.492A              Subjective    Did well with HEP during her family visit. Walking longer w/ 1# ankle w/o pain . 4th digit is bending at DIP w/ intention .       Precautions  Osteoporosis--taking Previa   Pain  1-3/10 in hand and elbow     Objective   Posterior elbow steri strips in place , clean and dry  Hinged elbow brace open fully   Cognition:  A+Ox3     Outcome Measures:  Quick Dash 64.65%   ROM  L elbow flexion 103 deg  L  , Extension -42 deg L,  Supination  full L ,  pronation 50 deg  wrist flexion 50 deg   L ,  Extend L 55 , L hand finger flexion much less limited and painful  limited than at initial visit but no specific measurement  L forearm and hand : Circumference L elbow L 24 cm ,  wrist  L  15.5 cm    Treatments:  Manual Therapy:  Not today;  L elbow joint mob into extension, forearm supination   wrist flex/ extend, all Finger joint mobilization into flexion/extension   Shoulder AROM into flexion    L wrist  extension mobilization  Passive elbow extension  (42 deg lacking extension )  There ex :   UBE 2.5'/2.5' standing at 45 RPM   Standing D1 and D2 PNF patterns w/ AA for increase endrange flexion and for increased supination/pronation --YTB attempted ut unable   Prayer stretch x 5 reps up/down, x 5 reps L/R   Shoulder flexion assist to end range ,,ecc  2# 2 x 10   Biceps curls 1#x 10 reps, 2# 2 x 10   Hammer supination / pronation 5 sec hold 2 x 10 each w/light assist at 3rd+4th digits to maintain     Not today:   Amb #3 ankle wt at wrist 50' x 2 fast pace w/ recip UE swing   Yellow /red clothespin stacking on post 12 pins x 2 each (to/from post -- assist w/ R UE for red) --not today  Yellow Theraputty /extend all digits w/ assist from R UE for increased flexion   Ball on wall (up/down, lateral, circles cw/ccw) x 20 reps x 2 sets each   Door washing B UE w/ towel : (up/down, lateral, circles cw/ccw) x 10 reps each     OP EDUCATION:  Densitization techniques   Scar massage   Self ROM   Shoulder flexion  Prayer stretch  Biceps curls   Biceps passive stretching   Walking w/ wrist wt  Hammer sup/pronation   D1D2 patterns   Goals:  1. Increase   L elbow ext/flex AROM to   -10 to 120 deg,  L forearm pron/supination to 45/90  deg , L wrist and finger AROM to WNL all directions,   2. Increase L elbow strength to  3+ /5 MMT grade, L wrist and  strength to 4/5  3. Decrease  L elbow circumference 3 centimeters and wrist and knuckles by 3/4 cm from baseline measurement  4. Tricia Cottrell will be able to carry 5 lb weight at side 20 ft  5. independent Home exercise program  6. Improve QuickDash score by 20 points

## 2024-08-01 ENCOUNTER — APPOINTMENT (OUTPATIENT)
Dept: PHYSICAL THERAPY | Facility: CLINIC | Age: 79
End: 2024-08-01
Payer: MEDICARE

## 2024-08-02 ENCOUNTER — EVALUATION (OUTPATIENT)
Dept: OCCUPATIONAL THERAPY | Facility: HOSPITAL | Age: 79
End: 2024-08-02
Payer: MEDICARE

## 2024-08-02 DIAGNOSIS — S42.411A CLOSED FRACTURE OF SUPRACONDYLAR HUMERUS, RIGHT, INITIAL ENCOUNTER: ICD-10-CM

## 2024-08-02 PROCEDURE — 97110 THERAPEUTIC EXERCISES: CPT | Mod: GO | Performed by: OCCUPATIONAL THERAPIST

## 2024-08-02 PROCEDURE — 97165 OT EVAL LOW COMPLEX 30 MIN: CPT | Mod: GO | Performed by: OCCUPATIONAL THERAPIST

## 2024-08-02 ASSESSMENT — ENCOUNTER SYMPTOMS
OCCASIONAL FEELINGS OF UNSTEADINESS: 0
LOSS OF SENSATION IN FEET: 0
DEPRESSION: 0

## 2024-08-02 NOTE — PROGRESS NOTES
Occupational Therapy  Occupational Therapy Orthopedic Evaluation    Patient Name: Gertrudis Cottrell  MRN: [id number]  Today's Date: 8/2/2024     Time:  Time Calculation  Start Time: 0820  Stop Time: 0900  Time Calculation (min): 40 min  OT Evaluation Time Entry  OT Evaluation (Low) Time Entry: 20  OT Therapeutic Procedures Time Entry  Therapeutic Exercise Time Entry: 20      Insurance Type: Payor: MEDICARE / Plan: MEDICARE PART A AND B / Product Type: *No Product type* /     Insurance:  Visit number: 1   General:  Reason for visit: L elbow  Referred by: Hilda Patton PA-C    Current Problem  1. Closed fracture of supracondylar humerus, right, initial encounter  Referral to Occupational Therapy          Precautions: per post op protocol       Medical History Form: Reviewed (scanned into chart)    Subjective:   Chief Complaint: L arm/hand  BASILIO: Fall   DOI: 05/12/2024  DOS: 05/30/2024 ORIF L elbow    Hand Dominance: Right    Current Condition since injury:   better      PAIN     Location: L elbow and hand  Intensity: 1/10 up to 3/10  Description: sharp  Aggravating Factors:  functional use  Relieving Factors:  Rest     Relevant Information (PMH & Previous Tests/Imaging): reviewed in chart       Prior Level of Function (PLOF)  Exercise/Physical Activity: jigsaw puzzles  Work/School: retired  Current ADL/IADL Status: independent with homemaking     Patients Living Environment: Reviewed and no concern    Primary Language: English    Pt goals for therapy: improve functional use of L hand for cooking, cleaning, dressing, jigsaw puzzles, and various household chores    Red Flags: Do you have any of the following? No  Fever/chills, unexplained weight changes, dizziness/fainting, unexplained change in bowel or bladder functions, unexplained malaise or muscle weakness, night pain/sweats, numbness or tingling    Objective:     Distance from tips to palm  2nd: 4 cm  3rd: 5 cm  4th: 6 cm  5th: 7 cm  Thumb opposition to tip of  3rd  Wrist extension/flexion 55/50  Wrist ulnar/radial deviation 20/10  Forearm pronation/supination 60/60  Elbow extension/flexion:     Physical Observation: significant limitations with elbow and digit range of motion, MCPs most limited, extensor habitus posture of hand  Edema: moderate    Sensory: no complaints of sensory issues     Good rehab potential    Outcome Measures:  Quick DASH: 47.72    EDUCATION: home exercise program, plan of care, activity modifications, pain management, and injury pathology     Goals:  Active       OT Goals       OT Goal 1       Start:  08/02/24    Expected End:  09/13/24       Achieve tips to palm for grasping objects in hand.         OT Goal 2       Start:  08/02/24    Expected End:  09/13/24       Improve elbow flexion to 120 degrees for dressing tasks.         OT Goal 3       Start:  08/02/24    Expected End:  10/11/24       Improve elbow extension to 15 degrees for reaching to grasp objects.         OT Goal 4       Start:  08/02/24    Expected End:  10/11/24       Improve Quick DASH to 15%.           OT Goal 5       Start:  08/02/24    Expected End:  10/11/24       Improve  strength to 70% of unaffected side for opening jars.           Plan of care was developed with input and agreement by the patient    Treatments:      Low complexity evaluation  20 min       Therapeutic Exercise:   20 min  HEP education and completion: MCP flexion stretch, IP flexion stretch, composite digit flexion stretch, prayer stretch, wrist flexion stretch, and forearm pronation stretch   Therapist wrapped digits into end range flexion with coban and with use of heating pad to increase tissue length.    Assessment: Patient is a 79 yo female  s/p L elbow fracture resulting in limited participation in pain-free ADLs and inability to perform at their prior level of function. Pt would benefit from occupational therapy to address the impairments found & listed previously in the objective section in  order to return to safe and pain-free ADLs and prior level of function.       Plan:      Planned Interventions include: therapeutic exercise, therapeutic activity, self-care home management, manual therapy, therapeutic activities, gait training, neuromuscular coordination, vasopneumatic, dry needling, aquatic therapy, electric stimulation, fluidotherapy, ultrasound, kinesiotaping, orthosis fabrication, wound care  Frequency: 1-2 x Week  Duration: 12 Weeks      Felipe Lee OT

## 2024-08-05 ENCOUNTER — APPOINTMENT (OUTPATIENT)
Dept: PHYSICAL THERAPY | Facility: CLINIC | Age: 79
End: 2024-08-05
Payer: MEDICARE

## 2024-08-06 ENCOUNTER — TREATMENT (OUTPATIENT)
Dept: OCCUPATIONAL THERAPY | Facility: HOSPITAL | Age: 79
End: 2024-08-06
Payer: MEDICARE

## 2024-08-06 ENCOUNTER — PATIENT MESSAGE (OUTPATIENT)
Dept: ORTHOPEDIC SURGERY | Facility: CLINIC | Age: 79
End: 2024-08-06
Payer: MEDICARE

## 2024-08-06 DIAGNOSIS — S42.411A CLOSED FRACTURE OF SUPRACONDYLAR HUMERUS, RIGHT, INITIAL ENCOUNTER: ICD-10-CM

## 2024-08-06 PROCEDURE — 97110 THERAPEUTIC EXERCISES: CPT | Mod: GO | Performed by: OCCUPATIONAL THERAPIST

## 2024-08-06 PROCEDURE — 97140 MANUAL THERAPY 1/> REGIONS: CPT | Mod: GO | Performed by: OCCUPATIONAL THERAPIST

## 2024-08-06 NOTE — PROGRESS NOTES
Occupational Therapy  Occupational Therapy Orthopedic Treatment    Patient Name: Gertrudis Cottrell  MRN: [id number]  Today's Date: 8/6/2024     Time:         Insurance Type: Payor: MEDICARE / Plan: MEDICARE PART A AND B / Product Type: *No Product type* /     Insurance:  Visit number: 2   General:  Reason for visit: L elbow  Referred by: Hilda Patton PA-C    Current Problem  1. Closed fracture of supracondylar humerus, right, initial encounter  Follow Up In Occupational Therapy        Time:  Time Calculation  Start Time: 0933  Stop Time: 1018  Time Calculation (min): 45 min  OT Therapeutic Procedures Time Entry  Manual Therapy Time Entry: 30  Therapeutic Exercise Time Entry: 15     Insurance Type: Payor: MEDICARE / Plan: MEDICARE PART A AND B / Product Type: *No Product type* /     Precautions: per post op protocol       Medical History Form: Reviewed (scanned into chart)    Subjective: I have been working on the exercises. I still have trouble making a fist.  Chief Complaint: L arm/hand  BASILIO: Fall   DOI: 05/12/2024  DOS: 05/30/2024 ORIF L elbow    Hand Dominance: Right    Current Condition since injury:   better      PAIN     Location: L elbow and hand  Intensity: 1/10 up to 3/10  Description: sharp  Aggravating Factors:  functional use  Relieving Factors:  Rest     Relevant Information (PMH & Previous Tests/Imaging): reviewed in chart       Prior Level of Function (PLOF)  Exercise/Physical Activity: jigsaw puzzles  Work/School: retired  Current ADL/IADL Status: independent with homemaking     Patients Living Environment: Reviewed and no concern    Primary Language: English    Pt goals for therapy: improve functional use of L hand for cooking, cleaning, dressing, jigsaw puzzles, and various household chores    Red Flags: Do you have any of the following? No  Fever/chills, unexplained weight changes, dizziness/fainting, unexplained change in bowel or bladder functions, unexplained malaise or muscle weakness, night  pain/sweats, numbness or tingling    Objective:     Distance from tips to palm  2nd: 3 cm was 4 cm  3rd: 4.5 cm was 5 cm  4th: 5.5 cm was 6 cm  5th: 6 cm was 7 cm  Thumb opposition to tip of 3rd  Wrist extension/flexion 55/50  Wrist ulnar/radial deviation 20/10  Forearm pronation/supination 60/60  Elbow extension/flexion: ; post treatment passive extension 30 degrees    Physical Observation: significant limitations with elbow and digit range of motion, MCPs most limited, extensor habitus posture of hand  Edema: moderate    Sensory: no complaints of sensory issues     Good rehab potential    Outcome Measures:  Quick DASH: 47.72    EDUCATION: home exercise program, plan of care, activity modifications, pain management, and injury pathology     Goals:  Active       OT Goals       OT Goal 1       Start:  08/02/24    Expected End:  09/13/24       Achieve tips to palm for grasping objects in hand.         OT Goal 2       Start:  08/02/24    Expected End:  09/13/24       Improve elbow flexion to 120 degrees for dressing tasks.         OT Goal 3       Start:  08/02/24    Expected End:  10/11/24       Improve elbow extension to 15 degrees for reaching to grasp objects.         OT Goal 4       Start:  08/02/24    Expected End:  10/11/24       Improve Quick DASH to 15%.           OT Goal 5       Start:  08/02/24    Expected End:  10/11/24       Improve  strength to 70% of unaffected side for opening jars.           Plan of care was developed with input and agreement by the patient    Treatments:        Therapeutic Exercise:   15 min  HEP education and completion: MCP flexion stretch, IP flexion stretch, composite digit flexion stretch, prayer stretch, wrist flexion stretch, and forearm pronation stretch   Therapist wrapped digits into end range flexion with coban and with use of heating pad to increase tissue length.  Added elbow flexion stretch against wall, elbow extension stretch against wall with forearm neutral  and forearm in supination.  Manual Therapy    30 min  Therapist performed manual digit, wrist, and elbow range of motion.  Therapist performed A/P joint mobilization combined with MCP flexion.  Therapist performed manual soft tissue mobilization and trigger point release along elbow flexors.    Assessment:     ROM improved today. Patient reports good compliance with home program. Patient to follow up Friday. Progressed home program today.     Plan:      Planned Interventions include: therapeutic exercise, therapeutic activity, self-care home management, manual therapy, therapeutic activities, gait training, neuromuscular coordination, vasopneumatic, dry needling, aquatic therapy, electric stimulation, fluidotherapy, ultrasound, kinesiotaping, orthosis fabrication, wound care  Frequency: 1-2 x Week  Duration: 12 Weeks      Felipe Lee, OT

## 2024-08-07 ENCOUNTER — APPOINTMENT (OUTPATIENT)
Dept: PHYSICAL THERAPY | Facility: CLINIC | Age: 79
End: 2024-08-07
Payer: MEDICARE

## 2024-08-09 ENCOUNTER — APPOINTMENT (OUTPATIENT)
Dept: PRIMARY CARE | Facility: CLINIC | Age: 79
End: 2024-08-09
Payer: MEDICARE

## 2024-08-09 ENCOUNTER — TREATMENT (OUTPATIENT)
Dept: OCCUPATIONAL THERAPY | Facility: HOSPITAL | Age: 79
End: 2024-08-09
Payer: MEDICARE

## 2024-08-09 ENCOUNTER — TELEPHONE (OUTPATIENT)
Dept: PRIMARY CARE | Facility: CLINIC | Age: 79
End: 2024-08-09

## 2024-08-09 DIAGNOSIS — D51.9 ANEMIA DUE TO VITAMIN B12 DEFICIENCY, UNSPECIFIED B12 DEFICIENCY TYPE: Primary | ICD-10-CM

## 2024-08-09 DIAGNOSIS — S42.411A CLOSED FRACTURE OF SUPRACONDYLAR HUMERUS, RIGHT, INITIAL ENCOUNTER: ICD-10-CM

## 2024-08-09 PROCEDURE — 97110 THERAPEUTIC EXERCISES: CPT | Mod: GO | Performed by: OCCUPATIONAL THERAPIST

## 2024-08-09 PROCEDURE — 97140 MANUAL THERAPY 1/> REGIONS: CPT | Mod: GO | Performed by: OCCUPATIONAL THERAPIST

## 2024-08-09 RX ORDER — CYANOCOBALAMIN 1000 UG/ML
1000 INJECTION, SOLUTION INTRAMUSCULAR; SUBCUTANEOUS ONCE
Status: SHIPPED | OUTPATIENT
Start: 2024-08-09

## 2024-08-09 NOTE — PROGRESS NOTES
Occupational Therapy  Occupational Therapy Orthopedic Treatment    Patient Name: Gertrudis Cottrell  MRN: [id number]  Today's Date: 8/9/2024     Time:         Insurance Type: Payor: MEDICARE / Plan: MEDICARE PART A AND B / Product Type: *No Product type* /     Insurance:  Visit number: 2   General:  Reason for visit: L elbow  Referred by: Hilda Patton PA-C  Time:  Time Calculation  Start Time: 0815  Stop Time: 0900  Time Calculation (min): 45 min  OT Therapeutic Procedures Time Entry  Manual Therapy Time Entry: 15  Therapeutic Exercise Time Entry: 30      Current Problem  1. Closed fracture of supracondylar humerus, right, initial encounter  Follow Up In Occupational Therapy        Time:        Insurance Type: Payor: MEDICARE / Plan: MEDICARE PART A AND B / Product Type: *No Product type* /     Precautions: per post op protocol       Medical History Form: Reviewed (scanned into chart)    Subjective: It has been sore. I have been working at the exercises.  Chief Complaint: L arm/hand  BASILIO: Fall   DOI: 05/12/2024  DOS: 05/30/2024 ORIF L elbow    Hand Dominance: Right    Current Condition since injury:   better      PAIN     Location: L elbow and hand  Intensity: 1/10 up to 3/10  Description: sharp  Aggravating Factors:  functional use  Relieving Factors:  Rest     Relevant Information (PMH & Previous Tests/Imaging): reviewed in chart       Prior Level of Function (PLOF)  Exercise/Physical Activity: jigsaw puzzles  Work/School: retired  Current ADL/IADL Status: independent with homemaking     Patients Living Environment: Reviewed and no concern    Primary Language: English    Pt goals for therapy: improve functional use of L hand for cooking, cleaning, dressing, jigsaw puzzles, and various household chores    Red Flags: Do you have any of the following? No  Fever/chills, unexplained weight changes, dizziness/fainting, unexplained change in bowel or bladder functions, unexplained malaise or muscle weakness, night  pain/sweats, numbness or tingling    Objective:     Distance from tips to palm  2nd: 3 cm   3rd: 4 cm was 4.5 cm    4th: 5 cm was 5.5 cm    5th: 6 cm   Thumb opposition to tip of 3rd  Wrist extension/flexion 55/50  Wrist ulnar/radial deviation 20/10  Forearm pronation/supination 60/60  Elbow extension/flexion: 40 was ; post treatment passive extension 30 degrees    Physical Observation: significant limitations with elbow and digit range of motion, MCPs most limited, extensor habitus posture of hand  Edema: moderate    Sensory: no complaints of sensory issues     Good rehab potential    Outcome Measures:  Quick DASH: 47.72    EDUCATION: home exercise program, plan of care, activity modifications, pain management, and injury pathology     Goals:  Active       OT Goals       OT Goal 1       Start:  08/02/24    Expected End:  09/13/24       Achieve tips to palm for grasping objects in hand.         OT Goal 2       Start:  08/02/24    Expected End:  09/13/24       Improve elbow flexion to 120 degrees for dressing tasks.         OT Goal 3       Start:  08/02/24    Expected End:  10/11/24       Improve elbow extension to 15 degrees for reaching to grasp objects.         OT Goal 4       Start:  08/02/24    Expected End:  10/11/24       Improve Quick DASH to 15%.           OT Goal 5       Start:  08/02/24    Expected End:  10/11/24       Improve  strength to 70% of unaffected side for opening jars.           Plan of care was developed with input and agreement by the patient    Treatments:        Therapeutic Exercise:   15 min  HEP education and completion: MCP flexion stretch, IP flexion stretch, composite digit flexion stretch, prayer stretch, wrist flexion stretch, and forearm pronation stretch   Therapist wrapped digits into end range flexion with coban and with use of heating pad to increase tissue length.  Elbow flexion stretch against wall, elbow extension stretch against wall with forearm neutral and forearm  in supination.  Manual Therapy    30 min  Therapist performed manual digit, wrist, and elbow range of motion.  Therapist performed A/P joint mobilization combined with MCP flexion.  Therapist performed manual soft tissue mobilization and trigger point release along elbow flexors.    Assessment:    ROM improved again today. Patient educated to continue focusing on MCP flexion with digit flexion. SF most limited. Patient reports good compliance with home program.    Plan:      Planned Interventions include: therapeutic exercise, therapeutic activity, self-care home management, manual therapy, therapeutic activities, gait training, neuromuscular coordination, vasopneumatic, dry needling, aquatic therapy, electric stimulation, fluidotherapy, ultrasound, kinesiotaping, orthosis fabrication, wound care  Frequency: 1-2 x Week  Duration: 12 Weeks      Felipe Lee OT

## 2024-08-12 DIAGNOSIS — S42.411A CLOSED FRACTURE OF SUPRACONDYLAR HUMERUS, RIGHT, INITIAL ENCOUNTER: Primary | ICD-10-CM

## 2024-08-12 RX ORDER — GABAPENTIN 800 MG/1
800 TABLET ORAL 3 TIMES DAILY
Qty: 90 TABLET | Refills: 0 | Status: SHIPPED | OUTPATIENT
Start: 2024-08-12 | End: 2024-09-11

## 2024-08-12 RX ORDER — GABAPENTIN 600 MG/1
600 TABLET ORAL 3 TIMES DAILY
Qty: 90 TABLET | Refills: 0 | OUTPATIENT
Start: 2024-08-12 | End: 2024-09-11

## 2024-08-13 ENCOUNTER — TREATMENT (OUTPATIENT)
Dept: OCCUPATIONAL THERAPY | Facility: HOSPITAL | Age: 79
End: 2024-08-13
Payer: MEDICARE

## 2024-08-13 ENCOUNTER — APPOINTMENT (OUTPATIENT)
Dept: PHYSICAL THERAPY | Facility: CLINIC | Age: 79
End: 2024-08-13
Payer: MEDICARE

## 2024-08-13 DIAGNOSIS — S42.411A CLOSED FRACTURE OF SUPRACONDYLAR HUMERUS, RIGHT, INITIAL ENCOUNTER: ICD-10-CM

## 2024-08-13 PROCEDURE — 97140 MANUAL THERAPY 1/> REGIONS: CPT | Mod: GO | Performed by: OCCUPATIONAL THERAPIST

## 2024-08-13 PROCEDURE — 97110 THERAPEUTIC EXERCISES: CPT | Mod: GO | Performed by: OCCUPATIONAL THERAPIST

## 2024-08-13 NOTE — PROGRESS NOTES
Occupational Therapy  Occupational Therapy Orthopedic Treatment    Patient Name: Gertrudis Cottrell  MRN: [id number]  Today's Date: 8/13/2024    Insurance Type: Payor: MEDICARE / Plan: MEDICARE PART A AND B / Product Type: *No Product type* /     Insurance:  Visit number: 3   General:  Reason for visit: L elbow  Referred by: Hilda Patton PA-C  Time:  Time Calculation  Start Time: 1100  Stop Time: 1150  Time Calculation (min): 50 min  OT Therapeutic Procedures Time Entry  Manual Therapy Time Entry: 25  Therapeutic Exercise Time Entry: 25      Current Problem  1. Closed fracture of supracondylar humerus, right, initial encounter  Follow Up In Occupational Therapy        Insurance Type: Payor: MEDICARE / Plan: MEDICARE PART A AND B / Product Type: *No Product type* /     Precautions: per post op protocol     Medical History Form: Reviewed (scanned into chart)    Subjective: I got the brace. I have been working on it.  Chief Complaint: L arm/hand  BASILIO: Fall   DOI: 05/12/2024  DOS: 05/30/2024 ORIF L elbow    Hand Dominance: Right    Current Condition since injury:   better      PAIN     Location: L elbow and hand  Intensity: 1/10 up to 3/10  Description: sharp  Aggravating Factors:  functional use  Relieving Factors:  Rest     Relevant Information (PMH & Previous Tests/Imaging): reviewed in chart     Prior Level of Function (PLOF)  Exercise/Physical Activity: jigsaw puzzles  Work/School: retired  Current ADL/IADL Status: independent with homemaking     Patients Living Environment: Reviewed and no concern    Primary Language: English    Pt goals for therapy: improve functional use of L hand for cooking, cleaning, dressing, jigsaw puzzles, and various household chores    Red Flags: Do you have any of the following? No  Fever/chills, unexplained weight changes, dizziness/fainting, unexplained change in bowel or bladder functions, unexplained malaise or muscle weakness, night pain/sweats, numbness or  tingling    Objective:     Distance from tips to palm -  2nd: 3 cm   3rd: 4 cm    4th: 5 cm   5th: 6 cm   Thumb opposition to tip of 3rd  Wrist extension/flexion 55/50  Wrist ulnar/radial deviation 20/10  Forearm pronation/supination 60/60  Elbow extension/flexion: 40 -105; post treatment passive extension 30 degrees    Physical Observation: significant limitations with elbow and digit range of motion, MCPs most limited, extensor habitus posture of hand  Edema: moderate    Sensory: no complaints of sensory issues     Good rehab potential    Outcome Measures:  Quick DASH: 47.72    EDUCATION: home exercise program, plan of care, activity modifications, pain management, and injury pathology     Goals:  Active       OT Goals       OT Goal 1       Start:  08/02/24    Expected End:  09/13/24       Achieve tips to palm for grasping objects in hand.         OT Goal 2       Start:  08/02/24    Expected End:  09/13/24       Improve elbow flexion to 120 degrees for dressing tasks.         OT Goal 3       Start:  08/02/24    Expected End:  10/11/24       Improve elbow extension to 15 degrees for reaching to grasp objects.         OT Goal 4       Start:  08/02/24    Expected End:  10/11/24       Improve Quick DASH to 15%.           OT Goal 5       Start:  08/02/24    Expected End:  10/11/24       Improve  strength to 70% of unaffected side for opening jars.           Plan of care was developed with input and agreement by the patient.    Treatments:      Therapeutic Exercise:   25 min  HEP education and completion: MCP flexion stretch, IP flexion stretch, composite digit flexion stretch, prayer stretch, wrist flexion stretch, and forearm pronation stretch.  Therapist wrapped digits into end range flexion with coban and with use of heating pad to increase tissue length.  Elbow flexion stretch against wall, elbow extension stretch against wall with forearm neutral and forearm in supination.    Manual Therapy    25  min  Therapist performed manual digit, wrist, and elbow range of motion.  Therapist performed A/P joint mobilization combined with MCP flexion.  Therapist performed manual soft tissue mobilization and trigger point release along elbow flexors.    Assessment:    Continued range of motion exercises today. Patient started using dynamic splint yesterday. Continued working on end range motion. Patient to focus on MCP flexion. Patient reports good compliance with home program.    Plan:      Planned Interventions include: therapeutic exercise, therapeutic activity, self-care home management, manual therapy, therapeutic activities, gait training, neuromuscular coordination, vasopneumatic, dry needling, aquatic therapy, electric stimulation, fluidotherapy, ultrasound, kinesiotaping, orthosis fabrication, wound care  Frequency: 1-2 x Week  Duration: 12 Weeks      Felipe Lee OT

## 2024-08-16 ENCOUNTER — APPOINTMENT (OUTPATIENT)
Dept: PHYSICAL THERAPY | Facility: CLINIC | Age: 79
End: 2024-08-16
Payer: MEDICARE

## 2024-08-17 RX ORDER — CYANOCOBALAMIN 1000 UG/ML
1000 INJECTION, SOLUTION INTRAMUSCULAR; SUBCUTANEOUS ONCE
Status: SHIPPED | OUTPATIENT
Start: 2024-08-17

## 2024-08-19 ENCOUNTER — APPOINTMENT (OUTPATIENT)
Dept: OCCUPATIONAL THERAPY | Facility: HOSPITAL | Age: 79
End: 2024-08-19
Payer: MEDICARE

## 2024-08-20 ENCOUNTER — TREATMENT (OUTPATIENT)
Dept: OCCUPATIONAL THERAPY | Facility: HOSPITAL | Age: 79
End: 2024-08-20
Payer: MEDICARE

## 2024-08-20 DIAGNOSIS — S42.411A CLOSED FRACTURE OF SUPRACONDYLAR HUMERUS, RIGHT, INITIAL ENCOUNTER: ICD-10-CM

## 2024-08-20 PROCEDURE — 97110 THERAPEUTIC EXERCISES: CPT | Mod: GO | Performed by: OCCUPATIONAL THERAPIST

## 2024-08-20 PROCEDURE — 97140 MANUAL THERAPY 1/> REGIONS: CPT | Mod: GO | Performed by: OCCUPATIONAL THERAPIST

## 2024-08-20 NOTE — PROGRESS NOTES
Occupational Therapy  Occupational Therapy Orthopedic Treatment    Patient Name: Gertrudis Cottrell  MRN: [id number]  Today's Date: 8/20/2024    Insurance Type: Payor: MEDICARE / Plan: MEDICARE PART A AND B / Product Type: *No Product type* /     Insurance:  Visit number: 5   General:  Reason for visit: L elbow  Referred by: Hilda Patton PA-C  Time:     Time:  Time Calculation  Start Time: 0845  Stop Time: 0930  Time Calculation (min): 45 min  OT Therapeutic Procedures Time Entry  Manual Therapy Time Entry: 35  Therapeutic Exercise Time Entry: 10      Current Problem  1. Closed fracture of supracondylar humerus, right, initial encounter  Follow Up In Occupational Therapy        Insurance Type: Payor: MEDICARE / Plan: MEDICARE PART A AND B / Product Type: *No Product type* /     Precautions: per post op protocol     Medical History Form: Reviewed (scanned into chart)    Subjective: I have been using the brace. It feels like it has been bothering my nerve.  Chief Complaint: L arm/hand  BASILIO: Fall   DOI: 05/12/2024  DOS: 05/30/2024 ORIF L elbow    Hand Dominance: Right    Current Condition since injury:   better      PAIN     Location: L elbow and hand  Intensity: 1/10 up to 3/10  Description: sharp  Aggravating Factors:  functional use  Relieving Factors:  Rest     Relevant Information (PMH & Previous Tests/Imaging): reviewed in chart     Prior Level of Function (PLOF)  Exercise/Physical Activity: jigsaw puzzles  Work/School: retired  Current ADL/IADL Status: independent with homemaking     Patients Living Environment: Reviewed and no concern    Primary Language: English    Pt goals for therapy: improve functional use of L hand for cooking, cleaning, dressing, jigsaw puzzles, and various household chores    Red Flags: Do you have any of the following? No  Fever/chills, unexplained weight changes, dizziness/fainting, unexplained change in bowel or bladder functions, unexplained malaise or muscle weakness, night  pain/sweats, numbness or tingling    Objective:     Distance from tips to palm -  2nd: 2.5 cm was  3 cm   3rd: 2.5 cm was 4 cm    4th: 4 cm was 5 cm   5th: 4 cm was 6 cm   Thumb opposition to tip of 3rd  Wrist extension/flexion 55/50  Wrist ulnar/radial deviation 20/10  Forearm pronation/supination 60/60  Elbow extension/flexion: 35 was 40 -105;      Physical Observation: significant limitations with elbow and digit range of motion, MCPs most limited, extensor habitus posture of hand    Edema: moderate    Sensory: no complaints of sensory issues     Good rehab potential    Outcome Measures:  Quick DASH: 47.72    EDUCATION: home exercise program, plan of care, activity modifications, pain management, and injury pathology     Goals:  Active       OT Goals       OT Goal 1       Start:  08/02/24    Expected End:  09/13/24       Achieve tips to palm for grasping objects in hand.         OT Goal 2       Start:  08/02/24    Expected End:  09/13/24       Improve elbow flexion to 120 degrees for dressing tasks.         OT Goal 3       Start:  08/02/24    Expected End:  10/11/24       Improve elbow extension to 15 degrees for reaching to grasp objects.         OT Goal 4       Start:  08/02/24    Expected End:  10/11/24       Improve Quick DASH to 15%.           OT Goal 5       Start:  08/02/24    Expected End:  10/11/24       Improve  strength to 70% of unaffected side for opening jars.           Plan of care was developed with input and agreement by the patient.    Treatments:      Therapeutic Exercise:   10 min  HEP education and completion: MCP flexion stretch, IP flexion stretch, composite digit flexion stretch, prayer stretch, wrist flexion stretch, and forearm pronation stretch.  Therapist wrapped digits into end range flexion with coban and with use of heating pad to increase tissue length.      Manual Therapy    35 min  Therapist performed manual digit, wrist, and elbow range of motion.  Therapist performed A/P  joint mobilization combined with MCP flexion.  Therapist performed manual soft tissue mobilization, IASTM, and trigger point release along elbow flexors.    Assessment:      Digit and elbow range of motion improved today. Patient reports good compliance with home program and use of mobilization orthoses. Anticipate continued improvements. Patient to follow up Friday.    Plan:      Planned Interventions include: therapeutic exercise, therapeutic activity, self-care home management, manual therapy, therapeutic activities, gait training, neuromuscular coordination, vasopneumatic, dry needling, aquatic therapy, electric stimulation, fluidotherapy, ultrasound, kinesiotaping, orthosis fabrication, wound care  Frequency: 1-2 x Week  Duration: 12 Weeks      Felipe Lee, OT

## 2024-08-23 ENCOUNTER — TREATMENT (OUTPATIENT)
Dept: OCCUPATIONAL THERAPY | Facility: HOSPITAL | Age: 79
End: 2024-08-23
Payer: MEDICARE

## 2024-08-23 DIAGNOSIS — S42.411A CLOSED FRACTURE OF SUPRACONDYLAR HUMERUS, RIGHT, INITIAL ENCOUNTER: ICD-10-CM

## 2024-08-23 PROCEDURE — 97110 THERAPEUTIC EXERCISES: CPT | Mod: GO | Performed by: OCCUPATIONAL THERAPIST

## 2024-08-23 PROCEDURE — 97140 MANUAL THERAPY 1/> REGIONS: CPT | Mod: GO | Performed by: OCCUPATIONAL THERAPIST

## 2024-08-23 NOTE — PROGRESS NOTES
Occupational Therapy  Occupational Therapy Orthopedic Treatment    Patient Name: Gertrudis Cottrell  MRN: [id number]  Today's Date: 8/23/2024    Insurance Type: Payor: MEDICARE / Plan: MEDICARE PART A AND B / Product Type: *No Product type* /   Time:  Time Calculation  Start Time: 0835  Stop Time: 0925  Time Calculation (min): 50 min  OT Therapeutic Procedures Time Entry  Manual Therapy Time Entry: 40  Therapeutic Exercise Time Entry: 10    Insurance:  Visit number: 6   General:  Reason for visit: L elbow  Referred by: Hilda Patton PA-C    Current Problem  1. Closed fracture of supracondylar humerus, right, initial encounter  Follow Up In Occupational Therapy        Insurance Type: Payor: MEDICARE / Plan: MEDICARE PART A AND B / Product Type: *No Product type* /     Precautions: per post op protocol     Medical History Form: Reviewed (scanned into chart)    Subjective:  I have been doing the brace. I got the new one Wednesday.   Chief Complaint: L arm/hand  BASILIO: Fall   DOI: 05/12/2024  DOS: 05/30/2024 ORIF L elbow    Hand Dominance: Right    Current Condition since injury:   better      PAIN     Location: L elbow and hand  Intensity: 1/10 up to 3/10  Description: sharp  Aggravating Factors:  functional use  Relieving Factors:  Rest     Relevant Information (PMH & Previous Tests/Imaging): reviewed in chart     Prior Level of Function (PLOF)  Exercise/Physical Activity: jigsaw puzzles  Work/School: retired  Current ADL/IADL Status: independent with homemaking     Patients Living Environment: Reviewed and no concern    Primary Language: English    Pt goals for therapy: improve functional use of L hand for cooking, cleaning, dressing, jigsaw puzzles, and various household chores    Red Flags: Do you have any of the following? No  Fever/chills, unexplained weight changes, dizziness/fainting, unexplained change in bowel or bladder functions, unexplained malaise or muscle weakness, night pain/sweats, numbness or  tingling    Objective:     Distance from tips to palm -  2nd: 2 cm was 2.5 cm   3rd: 2.5 cm     4th: 3 cm was  4 cm    5th: 4 cm    Thumb opposition to tip of 4th was 3rd  Wrist extension/flexion 75 was 55/60 was 50  Wrist ulnar/radial deviation 20/10  Forearm pronation/supination 60/60  Elbow extension/flexion: 35   -105;      Physical Observation: significant limitations with elbow and digit range of motion, MCPs most limited, extensor habitus posture of hand    Edema: moderate    Sensory: no complaints of sensory issues     Good rehab potential    Outcome Measures:  Quick DASH: 47.72    EDUCATION: home exercise program, plan of care, activity modifications, pain management, and injury pathology     Goals:  Active       OT Goals       OT Goal 1       Start:  08/02/24    Expected End:  09/13/24       Achieve tips to palm for grasping objects in hand.         OT Goal 2       Start:  08/02/24    Expected End:  09/13/24       Improve elbow flexion to 120 degrees for dressing tasks.         OT Goal 3       Start:  08/02/24    Expected End:  10/11/24       Improve elbow extension to 15 degrees for reaching to grasp objects.         OT Goal 4       Start:  08/02/24    Expected End:  10/11/24       Improve Quick DASH to 15%.           OT Goal 5       Start:  08/02/24    Expected End:  10/11/24       Improve  strength to 70% of unaffected side for opening jars.           Plan of care was developed with input and agreement by the patient.    Treatments:      Therapeutic Exercise:   10 min  HEP education and completion: MCP flexion stretch, IP flexion stretch, composite digit flexion stretch, prayer stretch, wrist flexion stretch, and forearm pronation stretch.  Therapist wrapped digits into end range flexion with coban and with use of heating pad to increase tissue length.      Manual Therapy    40 min  Therapist performed manual digit, wrist, and elbow range of motion.  Therapist performed A/P joint mobilization  combined with MCP flexion.  Therapist positioned MCPs in end range flexion with patient making active fist.  Therapist performed manual soft tissue mobilization, IASTM, and trigger point release along elbow flexors.    Assessment:    ROM improved today. Patient motivated and compliant with home program. Patient educated focus on MCP flexion. Patient verbalized understanding. Patient scheduled to follow up September 3rd due to traveling out of town. Patient to continue use of elbow mobilization orthosis. Anticipate continued improvements.       Plan:      Planned Interventions include: therapeutic exercise, therapeutic activity, self-care home management, manual therapy, therapeutic activities, gait training, neuromuscular coordination, vasopneumatic, dry needling, aquatic therapy, electric stimulation, fluidotherapy, ultrasound, kinesiotaping, orthosis fabrication, wound care  Frequency: 1-2 x Week  Duration: 12 Weeks      Felipe Lee OT

## 2024-09-03 ENCOUNTER — APPOINTMENT (OUTPATIENT)
Dept: PRIMARY CARE | Facility: CLINIC | Age: 79
End: 2024-09-03
Payer: MEDICARE

## 2024-09-03 ENCOUNTER — TREATMENT (OUTPATIENT)
Dept: OCCUPATIONAL THERAPY | Facility: HOSPITAL | Age: 79
End: 2024-09-03
Payer: MEDICARE

## 2024-09-03 DIAGNOSIS — S42.411A CLOSED FRACTURE OF SUPRACONDYLAR HUMERUS, RIGHT, INITIAL ENCOUNTER: ICD-10-CM

## 2024-09-03 PROCEDURE — 97140 MANUAL THERAPY 1/> REGIONS: CPT | Mod: GO | Performed by: OCCUPATIONAL THERAPIST

## 2024-09-03 PROCEDURE — 97110 THERAPEUTIC EXERCISES: CPT | Mod: GO | Performed by: OCCUPATIONAL THERAPIST

## 2024-09-03 NOTE — PROGRESS NOTES
Occupational Therapy  Occupational Therapy Orthopedic Treatment    Patient Name: Gertrudis Cottrell  MRN: [id number]  Today's Date: 9/3/2024    Time:  Time Calculation  Start Time: 0845  Stop Time: 0930  Time Calculation (min): 45 min  OT Therapeutic Procedures Time Entry  Manual Therapy Time Entry: 35  Therapeutic Exercise Time Entry: 10      Insurance Type: Payor: MEDICARE / Plan: MEDICARE PART A AND B / Product Type: *No Product type* /       Insurance:  Visit number: 7   General:  Reason for visit: L elbow  Referred by: Hilda Patton PA-C    Current Problem  1. Closed fracture of supracondylar humerus, right, initial encounter  Follow Up In Occupational Therapy        Insurance Type: Payor: MEDICARE / Plan: MEDICARE PART A AND B / Product Type: *No Product type* /     Precautions: per post op protocol     Medical History Form: Reviewed (scanned into chart)    Subjective:  I have been working on my elbow and fingers. I have been using the elbow splint.  Chief Complaint: L arm/hand  BASILIO: Fall   DOI: 05/12/2024  DOS: 05/30/2024 ORIF L elbow    Hand Dominance: Right    Current Condition since injury:   better      PAIN     Location: L elbow and hand  Intensity: 1/10 up to 3/10  Description: sharp  Aggravating Factors:  functional use  Relieving Factors:  Rest     Relevant Information (PMH & Previous Tests/Imaging): reviewed in chart     Prior Level of Function (PLOF)  Exercise/Physical Activity: jigsaw puzzles  Work/School: retired  Current ADL/IADL Status: independent with homemaking     Patients Living Environment: Reviewed and no concern    Primary Language: English    Pt goals for therapy: improve functional use of L hand for cooking, cleaning, dressing, jigsaw puzzles, and various household chores    Red Flags: Do you have any of the following? No  Fever/chills, unexplained weight changes, dizziness/fainting, unexplained change in bowel or bladder functions, unexplained malaise or muscle weakness, night  pain/sweats, numbness or tingling    Objective:     Distance from tips to palm -; MCP flexion   2nd: 2 cm  ; 45  3rd: 2.5 cm ;  50  4th: 3 cm   ;  50  5th: 4 cm  ; 35  Thumb opposition to tip of 4th was 3rd  Wrist extension/flexion 75 /62 was 60   Wrist ulnar/radial deviation 20/10  Forearm pronation/supination 60/60  Elbow extension/flexion: 35   -110 was 105;      Physical Observation: significant limitations with elbow and digit range of motion, MCPs most limited, extensor habitus posture of hand    Edema: moderate    Sensory: no complaints of sensory issues     Good rehab potential    Outcome Measures:  Quick DASH: 47.72    EDUCATION: home exercise program, plan of care, activity modifications, pain management, and injury pathology     Goals:  Active       OT Goals       OT Goal 1       Start:  08/02/24    Expected End:  09/13/24       Achieve tips to palm for grasping objects in hand.         OT Goal 2       Start:  08/02/24    Expected End:  09/13/24       Improve elbow flexion to 120 degrees for dressing tasks.         OT Goal 3       Start:  08/02/24    Expected End:  10/11/24       Improve elbow extension to 15 degrees for reaching to grasp objects.         OT Goal 4       Start:  08/02/24    Expected End:  10/11/24       Improve Quick DASH to 15%.           OT Goal 5       Start:  08/02/24    Expected End:  10/11/24       Improve  strength to 70% of unaffected side for opening jars.           Plan of care was developed with input and agreement by the patient.    Treatments:      Therapeutic Exercise:   10 min  HEP education and completion: MCP flexion stretch, IP flexion stretch, composite digit flexion stretch, prayer stretch, wrist flexion stretch, and forearm pronation stretch.  Therapist wrapped digits into end range flexion with coban and with use of heating pad to increase tissue length.      Manual Therapy    35 min  Therapist performed manual digit, wrist, and elbow range of motion.  Therapist  performed A/P joint mobilization combined with MCP flexion.  Therapist positioned MCPs in end range flexion with patient making active fist.  Therapist performed manual soft tissue mobilization, IASTM, and trigger point release along elbow flexors.    Assessment:     Slight improvement with wrist and elbow motion. Patient reports good compliance with home program use of mobilization orthosis. Patient has MD follow up Wednesday. Patient to follow up Friday.       Plan:      Planned Interventions include: therapeutic exercise, therapeutic activity, self-care home management, manual therapy, therapeutic activities, gait training, neuromuscular coordination, vasopneumatic, dry needling, aquatic therapy, electric stimulation, fluidotherapy, ultrasound, kinesiotaping, orthosis fabrication, wound care  Frequency: 1-2 x Week  Duration: 12 Weeks      Felipe Lee OT

## 2024-09-04 ENCOUNTER — HOSPITAL ENCOUNTER (OUTPATIENT)
Dept: RADIOLOGY | Facility: CLINIC | Age: 79
Discharge: HOME | End: 2024-09-04
Payer: MEDICARE

## 2024-09-04 ENCOUNTER — OFFICE VISIT (OUTPATIENT)
Dept: ORTHOPEDIC SURGERY | Facility: CLINIC | Age: 79
End: 2024-09-04
Payer: MEDICARE

## 2024-09-04 VITALS — HEIGHT: 64 IN | BODY MASS INDEX: 23.73 KG/M2 | WEIGHT: 139 LBS

## 2024-09-04 DIAGNOSIS — S42.411A CLOSED FRACTURE OF SUPRACONDYLAR HUMERUS, RIGHT, INITIAL ENCOUNTER: ICD-10-CM

## 2024-09-04 DIAGNOSIS — S42.411A CLOSED FRACTURE OF SUPRACONDYLAR HUMERUS, RIGHT, INITIAL ENCOUNTER: Primary | ICD-10-CM

## 2024-09-04 PROCEDURE — 1159F MED LIST DOCD IN RCRD: CPT | Performed by: ORTHOPAEDIC SURGERY

## 2024-09-04 PROCEDURE — 99214 OFFICE O/P EST MOD 30 MIN: CPT | Performed by: ORTHOPAEDIC SURGERY

## 2024-09-04 PROCEDURE — 1160F RVW MEDS BY RX/DR IN RCRD: CPT | Performed by: ORTHOPAEDIC SURGERY

## 2024-09-04 PROCEDURE — 73070 X-RAY EXAM OF ELBOW: CPT | Mod: LEFT SIDE | Performed by: RADIOLOGY

## 2024-09-04 PROCEDURE — 1036F TOBACCO NON-USER: CPT | Performed by: ORTHOPAEDIC SURGERY

## 2024-09-04 PROCEDURE — 73070 X-RAY EXAM OF ELBOW: CPT | Mod: LT

## 2024-09-04 RX ORDER — GABAPENTIN 800 MG/1
800 TABLET ORAL 3 TIMES DAILY
Qty: 90 TABLET | Refills: 0 | Status: SHIPPED | OUTPATIENT
Start: 2024-09-04 | End: 2024-10-04

## 2024-09-04 ASSESSMENT — PAIN - FUNCTIONAL ASSESSMENT: PAIN_FUNCTIONAL_ASSESSMENT: NO/DENIES PAIN

## 2024-09-04 NOTE — PROGRESS NOTES
History of Present Illness   Gertrudis Cottrell is a 78 y.o. female presenting today for post-op check from ORIF left distal humerus fracture on 5/30/2024.  She is 3 months at this point.  Her main issue has been stiffness with range of motion as well as some hand contractures.  She will show initially had some ulnar nerve sensory symptoms that responded very well to gabapentin that have all but continued to improve to completion.  She is currently on 800 mg of gabapentin 3 times a day and tolerating that well without side effect.  She is down to a very small area on her ulnar fifth digit of some persistent tingling which used to extend from her elbow.  She is now working in occupational and physical therapy and using dynamic stretching splinting for both flexion and extension to her elbow.  Past Medical History:   Diagnosis Date    Anemia     Hyperlipidemia     Hypertension     Hypothyroidism     PONV (postoperative nausea and vomiting)        Medication Documentation Review Audit       Reviewed by Ezequiel Oates MD (Physician) on 09/04/24 at 1137      Medication Order Taking? Sig Documenting Provider Last Dose Status   atorvastatin (Lipitor) 10 mg tablet [id number]  TAKE 1 TABLET (10 MG) BY MOUTH ONCE DAILY AT BEDTIME. Ezequiel Briseno MD  Active   calcium carbonate/vitamin D3 (CALCIUM + D ORAL) [id number] No Take by mouth. Historical Provider, MD Past Week Active   cyanocobalamin (Vitamin B-12) 1,000 mcg/mL injection [id number]  INJECT 1ML IN THE MUSCLE ONCE A MONTH Ezequiel Briseno MD  Active   cyanocobalamin (Vitamin B-12) injection 1,000 mcg [id number]   Ezequiel Briseno MD  Active   cyanocobalamin (Vitamin B-12) injection 1,000 mcg [id number]   Ezequiel Briseno MD  Active   cyanocobalamin (Vitamin B-12) injection 1,000 mcg [id number]   Ezequiel Briseno MD  Active   gabapentin (Neurontin) 800 mg tablet [id number]  Take 1 tablet (800 mg) by mouth 3 times a day. Hilda Patton PA-C  Active    HYDROcodone-acetaminophen (Norco) 5-325 mg tablet [id number]  Take 1 tablet by mouth every 6 hours if needed for severe pain (7 - 10). Hilda Patton PA-C  Active   levothyroxine (Synthroid, Levoxyl) 100 mcg tablet [id number]  TAKE 1 TABLET (100 MCG) BY MOUTH ONCE DAILY IN THE MORNING. TAKE BEFORE MEALS. Ezequiel Briseno MD  Active   losartan (Cozaar) 25 mg tablet [id number]  TAKE 1 TABLET BY MOUTH EVERY DAY Ezequiel Briseno MD  Active                    Allergies   Allergen Reactions    Ciprofloxacin Diarrhea       Social History     Socioeconomic History    Marital status:      Spouse name: Not on file    Number of children: Not on file    Years of education: Not on file    Highest education level: Not on file   Occupational History    Not on file   Tobacco Use    Smoking status: Never    Smokeless tobacco: Never   Vaping Use    Vaping status: Never Used   Substance and Sexual Activity    Alcohol use: Not Currently    Drug use: Not Currently    Sexual activity: Not on file   Other Topics Concern    Not on file   Social History Narrative    Not on file     Social Determinants of Health     Financial Resource Strain: Not on file   Food Insecurity: Not on file   Transportation Needs: Not on file   Physical Activity: Not on file   Stress: Not on file   Social Connections: Not on file   Intimate Partner Violence: Not on file   Housing Stability: Not on file       Past Surgical History:   Procedure Laterality Date    BREAST BIOPSY  1980s    HYSTERECTOMY  1997          Review of Systems:  30 point ROS reviewed and negative other than as listed in the HPI     Physical Exam:  Gen: The pt is A&Ox3, NAD, and appear state age and weight  Psychiatric: mood and affect are appropriate   Eyes: sclera are white, EOM grossly intact  ENT: MMM  Neck: supple, thyroid is midline  Respiratory: respirations are nonlabored, chest rise symmetric  CV: rate is regular by palpation of distal pulses  Abdomen: nondistended   Integument:  no obvious cutaneous lesions noted. No signs of lymphangitis. No signs of systemic edema.   MSK: Left elbow surgical incision well healing without edema, erythema, drainage, or other s/sx of infection.  Range of motion is about 30 degrees lacking of full extension and flexing to 120 degrees.  She also has some metacarpal phalangeal joint contractures in digits 2 3 and 5 most predominantly.  She cannot make a full fist yet.  She has some altered sensation only to the ulnar aspect of her fifth digit.  She is making improvement overall per her report.  Sensation is intact to light touch in the axillary, median and radial nerve distributions distally. The hand is warm and well-perfused.    Imaging:  I personally reviewed multiple views of the left elbow were obtained in the office today demonstrate maintenance of reduction, interval healing, and a stable position of the hardware.  There is no sign of posttraumatic arthritis.     Assessment   78 y.o. female post-op from open reduction internal fixation left distal humerus fracture on 5/30/2024.  She still having issues with hand flexion contracture and elbow flexion and extension contracture.  She should continue to work with her dynamic splint.  She should continue to work with dynamic splinting for her hand specifically.  She will work on this and if she is still having major deficits at 6 months I may have her see one of my hand partners or get an EMG if there is lack of recovery of her ulnar nerve but she is actually generally well from that standpoint.  Continue gabapentin dose without change for now.  Will reassess in 3 months.  2 views of her left elbow at that time.  No range of motion restrictions and no lifting restrictions at this time as well.     Plan:       Follow-up 3 months with 2 views left elbow.     All of the patient's questions/concerns address and they are in agreement with the plan.

## 2024-09-06 ENCOUNTER — TREATMENT (OUTPATIENT)
Dept: OCCUPATIONAL THERAPY | Facility: HOSPITAL | Age: 79
End: 2024-09-06
Payer: MEDICARE

## 2024-09-06 ENCOUNTER — APPOINTMENT (OUTPATIENT)
Dept: PRIMARY CARE | Facility: CLINIC | Age: 79
End: 2024-09-06
Payer: MEDICARE

## 2024-09-06 DIAGNOSIS — E53.8 B12 DEFICIENCY: Primary | ICD-10-CM

## 2024-09-06 DIAGNOSIS — S42.411A CLOSED FRACTURE OF SUPRACONDYLAR HUMERUS, RIGHT, INITIAL ENCOUNTER: ICD-10-CM

## 2024-09-06 PROCEDURE — 97110 THERAPEUTIC EXERCISES: CPT | Mod: GO | Performed by: OCCUPATIONAL THERAPIST

## 2024-09-06 PROCEDURE — 97140 MANUAL THERAPY 1/> REGIONS: CPT | Mod: GO | Performed by: OCCUPATIONAL THERAPIST

## 2024-09-06 NOTE — PROGRESS NOTES
Occupational Therapy  Occupational Therapy Orthopedic Treatment    Patient Name: Gertrudis Cottrell  MRN: [id number]  Today's Date: 9/6/2024    Time:   Time:  Time Calculation  Start Time: 0840  Stop Time: 0925  Time Calculation (min): 45 min  OT Therapeutic Procedures Time Entry  Manual Therapy Time Entry: 15  Therapeutic Exercise Time Entry: 30    Insurance Type: Payor: MEDICARE / Plan: MEDICARE PART A AND B / Product Type: *No Product type* /       Insurance:  Visit number: 8   General:  Reason for visit: L elbow  Referred by: Hilda Patton PA-C    Current Problem  1. Closed fracture of supracondylar humerus, right, initial encounter  Follow Up In Occupational Therapy        Insurance Type: Payor: MEDICARE / Plan: MEDICARE PART A AND B / Product Type: *No Product type* /     Precautions: per post op protocol     Medical History Form: Reviewed (scanned into chart)    Subjective:  I saw the doctor. He wants me to continue with therapy and for you to make that finger splint.  Chief Complaint: L arm/hand  BASILIO: Fall   DOI: 05/12/2024  DOS: 05/30/2024 ORIF L elbow    Hand Dominance: Right    Current Condition since injury:   better      PAIN     Location: L elbow and hand  Intensity: 1/10 up to 3/10  Description: sharp  Aggravating Factors:  functional use  Relieving Factors:  Rest     Relevant Information (PMH & Previous Tests/Imaging): reviewed in chart     Prior Level of Function (PLOF)  Exercise/Physical Activity: jigsaw puzzles  Work/School: retired  Current ADL/IADL Status: independent with homemaking     Patients Living Environment: Reviewed and no concern    Primary Language: English    Pt goals for therapy: improve functional use of L hand for cooking, cleaning, dressing, jigsaw puzzles, and various household chores    Red Flags: Do you have any of the following? No  Fever/chills, unexplained weight changes, dizziness/fainting, unexplained change in bowel or bladder functions, unexplained malaise or muscle  weakness, night pain/sweats, numbness or tingling    Objective:     Distance from tips to palm -; MCP flexion   2nd: 2 cm  ; 45  3rd: 2.5 cm ;  50  4th: 3 cm   ;  50  5th: 4 cm  ; 35  Thumb opposition to tip of 4th was 3rd  Wrist extension/flexion 75 /62 was 60   Wrist ulnar/radial deviation 20/10  Forearm pronation/supination 60/60  Elbow extension/flexion: 35   -110 was 105;      Physical Observation: significant limitations with elbow and digit range of motion, MCPs most limited, extensor habitus posture of hand    Edema: moderate    Sensory: no complaints of sensory issues     Good rehab potential    Outcome Measures:  Quick DASH: 47.72    EDUCATION: home exercise program, plan of care, activity modifications, pain management, and injury pathology     Goals:  Active       OT Goals       OT Goal 1       Start:  08/02/24    Expected End:  09/13/24       Achieve tips to palm for grasping objects in hand.         OT Goal 2       Start:  08/02/24    Expected End:  09/13/24       Improve elbow flexion to 120 degrees for dressing tasks.         OT Goal 3       Start:  08/02/24    Expected End:  10/11/24       Improve elbow extension to 15 degrees for reaching to grasp objects.         OT Goal 4       Start:  08/02/24    Expected End:  10/11/24       Improve Quick DASH to 15%.           OT Goal 5       Start:  08/02/24    Expected End:  10/11/24       Improve  strength to 70% of unaffected side for opening jars.           Plan of care was developed with input and agreement by the patient.    Treatments:      Therapeutic Exercise:   30 min  HEP education and completion: MCP flexion stretch, IP flexion stretch, composite digit flexion stretch, prayer stretch, wrist flexion stretch, and forearm pronation stretch.  Digit range of motion in dry warm medium to increase tissue extensibility and improve range of motion.  Wrist and digit extension stretch with power web.  Therapist fabricated static progressive MCP flexion  orthosis.      Manual Therapy    15 min  Therapist performed manual digit, wrist, and elbow range of motion.  Therapist performed A/P joint mobilization combined with MCP flexion.  Therapist positioned MCPs in end range flexion with patient making active fist.      Assessment:    Therapist fabricated digit mobilization orthosis today. Patient continues to report good compliance with home program and use of elbow mobilization orthosis. Patient to continue working on end ranges. Anticipate continued improvements with digit and elbow motion.       Plan:      Planned Interventions include: therapeutic exercise, therapeutic activity, self-care home management, manual therapy, therapeutic activities, gait training, neuromuscular coordination, vasopneumatic, dry needling, aquatic therapy, electric stimulation, fluidotherapy, ultrasound, kinesiotaping, orthosis fabrication, wound care  Frequency: 1-2 x Week  Duration: 12 Weeks      Felipe Lee OT

## 2024-09-10 ENCOUNTER — TREATMENT (OUTPATIENT)
Dept: OCCUPATIONAL THERAPY | Facility: HOSPITAL | Age: 79
End: 2024-09-10
Payer: MEDICARE

## 2024-09-10 DIAGNOSIS — S42.411A CLOSED FRACTURE OF SUPRACONDYLAR HUMERUS, RIGHT, INITIAL ENCOUNTER: ICD-10-CM

## 2024-09-10 PROCEDURE — 97110 THERAPEUTIC EXERCISES: CPT | Mod: GO | Performed by: OCCUPATIONAL THERAPIST

## 2024-09-10 PROCEDURE — 97140 MANUAL THERAPY 1/> REGIONS: CPT | Mod: GO | Performed by: OCCUPATIONAL THERAPIST

## 2024-09-10 NOTE — PROGRESS NOTES
Occupational Therapy  Occupational Therapy Orthopedic Treatment    Patient Name: Gertrudis Cottrell  MRN: [id number]  Today's Date: 9/10/2024    Time:  Time Calculation  Start Time: 1440  Stop Time: 1525  Time Calculation (min): 45 min  OT Therapeutic Procedures Time Entry  Manual Therapy Time Entry: 35  Therapeutic Exercise Time Entry: 10     Insurance Type: Payor: MEDICARE / Plan: MEDICARE PART A AND B / Product Type: *No Product type* /       Insurance:  Visit number: 9  General:  Reason for visit: L elbow  Referred by: Hilda Patton PA-C    Current Problem  1. Closed fracture of supracondylar humerus, right, initial encounter  Follow Up In Occupational Therapy        Insurance Type: Payor: MEDICARE / Plan: MEDICARE PART A AND B / Product Type: *No Product type* /     Precautions: per post op protocol     Medical History Form: Reviewed (scanned into chart)    Subjective:  I have been using the splints. I have been using the one for my fingers for twenty minutes three times per day.  Chief Complaint: L arm/hand  BASILIO: Fall   DOI: 05/12/2024  DOS: 05/30/2024 ORIF L elbow    Hand Dominance: Right    Current Condition since injury:   better      PAIN     Location: L elbow and hand  Intensity: 1/10 up to 3/10  Description: sharp  Aggravating Factors:  functional use  Relieving Factors:  Rest     Relevant Information (PMH & Previous Tests/Imaging): reviewed in chart     Prior Level of Function (PLOF)  Exercise/Physical Activity: jigsaw puzzles  Work/School: retired  Current ADL/IADL Status: independent with homemaking     Patients Living Environment: Reviewed and no concern    Primary Language: English    Pt goals for therapy: improve functional use of L hand for cooking, cleaning, dressing, jigsaw puzzles, and various household chores    Red Flags: Do you have any of the following? No  Fever/chills, unexplained weight changes, dizziness/fainting, unexplained change in bowel or bladder functions, unexplained malaise or  muscle weakness, night pain/sweats, numbness or tingling    Objective:     Distance from tips to palm -; MCP flexion   2nd: 2 cm  ; 55 was 45  3rd: 2.5 cm ;  55 was 50  4th: 3 cm   ;  55 was 50  5th: 4 cm  ; 45 was 35  Thumb opposition to tip of 4th was 3rd  Wrist extension/flexion 75 /62   Wrist ulnar/radial deviation 20/10  Forearm pronation/supination 60/60  Elbow extension/flexion: 33 was 35   -115 was 110 ;      Physical Observation: significant limitations with elbow and digit range of motion, MCPs most limited, extensor habitus posture of hand    Edema: moderate    Sensory: no complaints of sensory issues     Good rehab potential    Outcome Measures:  Quick DASH: 47.72    EDUCATION: home exercise program, plan of care, activity modifications, pain management, and injury pathology     Goals:  Active       OT Goals       OT Goal 1       Start:  08/02/24    Expected End:  09/13/24       Achieve tips to palm for grasping objects in hand.         OT Goal 2       Start:  08/02/24    Expected End:  09/13/24       Improve elbow flexion to 120 degrees for dressing tasks.         OT Goal 3       Start:  08/02/24    Expected End:  10/11/24       Improve elbow extension to 15 degrees for reaching to grasp objects.         OT Goal 4       Start:  08/02/24    Expected End:  10/11/24       Improve Quick DASH to 15%.           OT Goal 5       Start:  08/02/24    Expected End:  10/11/24       Improve  strength to 70% of unaffected side for opening jars.           Plan of care was developed with input and agreement by the patient.    Treatments:      Therapeutic Exercise:   10 min  HEP education and completion: MCP flexion stretch, IP flexion stretch, composite digit flexion stretch, prayer stretch, wrist flexion stretch, and forearm pronation stretch.  Standing elbow flexion and extension stretches against door.   Active digit flexion.    Manual Therapy    35 min  Therapist performed manual digit, wrist, and elbow range  of motion.  Therapist performed A/P joint mobilization combined with MCP flexion.  Therapist positioned MCPs in end range flexion with patient making active fist.  Therapist performed manual soft tissue mobilization along elbow flexors.  Therapist perform elbow distraction combined with elbow extension.    Assessment:    ROM improved today. Patient reports good compliance with home program and wear of mobilization orthoses. Patient to follow up Friday.     Plan:      Planned Interventions include: therapeutic exercise, therapeutic activity, self-care home management, manual therapy, therapeutic activities, gait training, neuromuscular coordination, vasopneumatic, dry needling, aquatic therapy, electric stimulation, fluidotherapy, ultrasound, kinesiotaping, orthosis fabrication, wound care  Frequency: 1-2 x Week  Duration: 12 Weeks      Felipe Lee, OT

## 2024-09-11 RX ORDER — CYANOCOBALAMIN 1000 UG/ML
1000 INJECTION, SOLUTION INTRAMUSCULAR; SUBCUTANEOUS ONCE
Status: SHIPPED | OUTPATIENT
Start: 2024-09-11

## 2024-09-13 ENCOUNTER — TREATMENT (OUTPATIENT)
Dept: OCCUPATIONAL THERAPY | Facility: HOSPITAL | Age: 79
End: 2024-09-13
Payer: MEDICARE

## 2024-09-13 DIAGNOSIS — S42.411A CLOSED FRACTURE OF SUPRACONDYLAR HUMERUS, RIGHT, INITIAL ENCOUNTER: ICD-10-CM

## 2024-09-13 PROCEDURE — 97110 THERAPEUTIC EXERCISES: CPT | Mod: GO | Performed by: OCCUPATIONAL THERAPIST

## 2024-09-13 PROCEDURE — 97140 MANUAL THERAPY 1/> REGIONS: CPT | Mod: GO | Performed by: OCCUPATIONAL THERAPIST

## 2024-09-13 NOTE — PROGRESS NOTES
Occupational Therapy  Occupational Therapy Orthopedic Treatment    Patient Name: Gertrudis Cottrell  MRN: [id number]  Today's Date: 9/13/2024    Time:  Time Calculation  Start Time: 0755  Stop Time: 0840  Time Calculation (min): 45 min  OT Therapeutic Procedures Time Entry  Manual Therapy Time Entry: 35  Therapeutic Exercise Time Entry: 10       Insurance Type: Payor: MEDICARE / Plan: MEDICARE PART A AND B / Product Type: *No Product type* /       Insurance:  Visit number: 10  General:  Reason for visit: L elbow  Referred by: Hilda Patton PA-C    Current Problem  1. Closed fracture of supracondylar humerus, right, initial encounter  Follow Up In Occupational Therapy        Insurance Type: Payor: MEDICARE / Plan: MEDICARE PART A AND B / Product Type: *No Product type* /     Precautions: per post op protocol     Medical History Form: Reviewed (scanned into chart)    Subjective:  I have been using the splints. I have been using the one for my fingers for twenty minutes three times per day.  Chief Complaint: L arm/hand  BASILIO: Fall   DOI: 05/12/2024  DOS: 05/30/2024 ORIF L elbow    Hand Dominance: Right    Current Condition since injury:   better      PAIN     Location: L elbow and hand  Intensity: 1/10 up to 3/10  Description: sharp  Aggravating Factors:  functional use  Relieving Factors:  Rest     Relevant Information (PMH & Previous Tests/Imaging): reviewed in chart     Prior Level of Function (PLOF)  Exercise/Physical Activity: jigsaw puzzles  Work/School: retired  Current ADL/IADL Status: independent with homemaking     Patients Living Environment: Reviewed and no concern    Primary Language: English    Pt goals for therapy: improve functional use of L hand for cooking, cleaning, dressing, jigsaw puzzles, and various household chores    Red Flags: Do you have any of the following? No  Fever/chills, unexplained weight changes, dizziness/fainting, unexplained change in bowel or bladder functions, unexplained malaise  or muscle weakness, night pain/sweats, numbness or tingling    Objective:     Distance from tips to palm -; MCP flexion   2nd: 2 cm  ; 55    3rd: 2.5 cm ;  55    4th: 3 cm   ;  55    5th: 4 cm  ; 45   Thumb opposition to tip of 4th   Wrist extension/flexion 75 /62   Wrist ulnar/radial deviation 20/10  Forearm pronation/supination 60/60  Elbow extension/flexion: 33   -115   ;      Physical Observation: significant limitations with elbow and digit range of motion, MCPs most limited, extensor habitus posture of hand    Edema: moderate    Sensory: no complaints of sensory issues     Good rehab potential    Outcome Measures:  Quick DASH: 47.72    EDUCATION: home exercise program, plan of care, activity modifications, pain management, and injury pathology     Goals:  Active       OT Goals       OT Goal 1       Start:  08/02/24    Expected End:  09/13/24       Achieve tips to palm for grasping objects in hand.         OT Goal 2       Start:  08/02/24    Expected End:  09/13/24       Improve elbow flexion to 120 degrees for dressing tasks.         OT Goal 3       Start:  08/02/24    Expected End:  10/11/24       Improve elbow extension to 15 degrees for reaching to grasp objects.         OT Goal 4       Start:  08/02/24    Expected End:  10/11/24       Improve Quick DASH to 15%.           OT Goal 5       Start:  08/02/24    Expected End:  10/11/24       Improve  strength to 70% of unaffected side for opening jars.           Plan of care was developed with input and agreement by the patient.    Treatments:      Therapeutic Exercise:   10 min  HEP education and completion: MCP flexion stretch, IP flexion stretch, composite digit flexion stretch, prayer stretch, wrist flexion stretch, and forearm pronation stretch.  Standing elbow flexion and extension stretches against door.   Active digit flexion.  Elbow extension stretch against wall with cane.    Manual Therapy    35 min  Therapist performed manual digit, wrist, and  elbow range of motion.  Therapist performed A/P joint mobilization combined with MCP flexion.  Therapist positioned MCPs in end range flexion with patient making active fist.  Therapist performed manual soft tissue mobilization along elbow flexors.  Therapist perform elbow distraction combined with elbow extension.  Place and hold into composite flexion.    Assessment:    Continued stretches today.  Patient reports good compliance with home program. Patient to follow up next week. End feel improving for MCPs.     Plan:      Planned Interventions include: therapeutic exercise, therapeutic activity, self-care home management, manual therapy, therapeutic activities, gait training, neuromuscular coordination, vasopneumatic, dry needling, aquatic therapy, electric stimulation, fluidotherapy, ultrasound, kinesiotaping, orthosis fabrication, wound care  Frequency: 1-2 x Week  Duration: 12 Weeks      Felipe Lee, OT

## 2024-09-16 ENCOUNTER — TREATMENT (OUTPATIENT)
Dept: OCCUPATIONAL THERAPY | Facility: HOSPITAL | Age: 79
End: 2024-09-16
Payer: MEDICARE

## 2024-09-16 DIAGNOSIS — S42.411A CLOSED FRACTURE OF SUPRACONDYLAR HUMERUS, RIGHT, INITIAL ENCOUNTER: ICD-10-CM

## 2024-09-16 PROCEDURE — 97110 THERAPEUTIC EXERCISES: CPT | Mod: GO | Performed by: OCCUPATIONAL THERAPIST

## 2024-09-16 PROCEDURE — 97140 MANUAL THERAPY 1/> REGIONS: CPT | Mod: GO | Performed by: OCCUPATIONAL THERAPIST

## 2024-09-16 NOTE — PROGRESS NOTES
Occupational Therapy  Occupational Therapy Orthopedic Treatment    Patient Name: Gertrudis Cottrell  MRN: [id number]  Today's Date: 9/16/2024       Insurance Type: Payor: MEDICARE / Plan: MEDICARE PART A AND B / Product Type: *No Product type* /   Time:  Time Calculation  Start Time: 1105  Stop Time: 1155  Time Calculation (min): 50 min  OT Therapeutic Procedures Time Entry  Manual Therapy Time Entry: 40  Therapeutic Exercise Time Entry: 10    Insurance:  Visit number: 11  General:  Reason for visit: L elbow  Referred by: Hilda Patton PA-C    Current Problem  1. Closed fracture of supracondylar humerus, right, initial encounter  Follow Up In Occupational Therapy        Insurance Type: Payor: MEDICARE / Plan: MEDICARE PART A AND B / Product Type: *No Product type* /     Precautions: per post op protocol     Medical History Form: Reviewed (scanned into chart)    Subjective:  I feel it getting better. My fingers are still sore but I have been able to adjust the straps to bend my fingers more.    Chief Complaint: L arm/hand  BASILIO: Fall   DOI: 05/12/2024  DOS: 05/30/2024 ORIF L elbow    Hand Dominance: Right    Current Condition since injury:   better      PAIN     Location: L elbow and hand  Intensity: 1/10 up to 3/10  Description: sharp  Aggravating Factors:  functional use  Relieving Factors:  Rest     Relevant Information (PMH & Previous Tests/Imaging): reviewed in chart     Prior Level of Function (PLOF)  Exercise/Physical Activity: jigsaw puzzles  Work/School: retired  Current ADL/IADL Status: independent with homemaking     Patients Living Environment: Reviewed and no concern    Primary Language: English    Pt goals for therapy: improve functional use of L hand for cooking, cleaning, dressing, jigsaw puzzles, and various household chores    Red Flags: Do you have any of the following? No  Fever/chills, unexplained weight changes, dizziness/fainting, unexplained change in bowel or bladder functions, unexplained  malaise or muscle weakness, night pain/sweats, numbness or tingling    Objective:     Distance from tips to palm -; MCP flexion   2nd: 1.5 cm was 2 cm  ; 60 was 55    3rd: 2 cm was 2.5 cm ;  60 was 55    4th: 2.5 was 3 cm   ;  60 was 55    5th: 3 cm was 4 cm  ; 55 was 45   Thumb opposition to tip of 4th   Wrist extension/flexion 75 /62   Wrist ulnar/radial deviation 20/10  Forearm pronation/supination 60/60  Elbow extension/flexion: 28 was 33   -115   ;      Physical Observation: significant limitations with elbow and digit range of motion, MCPs most limited, extensor habitus posture of hand    Edema: moderate    Sensory: no complaints of sensory issues     Good rehab potential    Outcome Measures:  Quick DASH: 47.72    EDUCATION: home exercise program, plan of care, activity modifications, pain management, and injury pathology     Goals:  Active       OT Goals       OT Goal 1       Start:  08/02/24    Expected End:  09/13/24       Achieve tips to palm for grasping objects in hand.         OT Goal 2       Start:  08/02/24    Expected End:  09/13/24       Improve elbow flexion to 120 degrees for dressing tasks.         OT Goal 3       Start:  08/02/24    Expected End:  10/11/24       Improve elbow extension to 15 degrees for reaching to grasp objects.         OT Goal 4       Start:  08/02/24    Expected End:  10/11/24       Improve Quick DASH to 15%.           OT Goal 5       Start:  08/02/24    Expected End:  10/11/24       Improve  strength to 70% of unaffected side for opening jars.           Plan of care was developed with input and agreement by the patient.    Treatments:      Therapeutic Exercise:   10 min  HEP education and completion: MCP flexion stretch, IP flexion stretch, composite digit flexion stretch, prayer stretch, wrist flexion stretch, and forearm pronation stretch.  Digits positioned into end range digit flexion with coban and use of heating pad to improve tissue extensibility and improve range  of motion.     Manual Therapy    40 min  Therapist performed manual digit, wrist, and elbow range of motion.  Therapist performed A/P joint mobilization combined with MCP flexion.  Therapist positioned MCPs in end range flexion with patient making active fist.  Therapist performed manual soft tissue mobilization along elbow flexors.  Therapist perform elbow distraction combined with elbow extension.      Assessment:    Continued range of motion stretches today. Patient reports good compliance with use of mobilization splints. Patient had improved digit range of motion today. Patient to follow up next week due to traveling out of town.     Plan:      Planned Interventions include: therapeutic exercise, therapeutic activity, self-care home management, manual therapy, therapeutic activities, gait training, neuromuscular coordination, vasopneumatic, dry needling, aquatic therapy, electric stimulation, fluidotherapy, ultrasound, kinesiotaping, orthosis fabrication, wound care  Frequency: 1-2 x Week  Duration: 12 Weeks      Felipe Lee, OT

## 2024-09-17 ENCOUNTER — LAB (OUTPATIENT)
Dept: LAB | Facility: LAB | Age: 79
End: 2024-09-17
Payer: MEDICARE

## 2024-09-17 ENCOUNTER — APPOINTMENT (OUTPATIENT)
Dept: PRIMARY CARE | Facility: CLINIC | Age: 79
End: 2024-09-17
Payer: MEDICARE

## 2024-09-17 VITALS — SYSTOLIC BLOOD PRESSURE: 138 MMHG | BODY MASS INDEX: 24.72 KG/M2 | WEIGHT: 144 LBS | DIASTOLIC BLOOD PRESSURE: 76 MMHG

## 2024-09-17 DIAGNOSIS — I10 PRIMARY HYPERTENSION: ICD-10-CM

## 2024-09-17 DIAGNOSIS — E78.2 MIXED HYPERLIPIDEMIA: ICD-10-CM

## 2024-09-17 DIAGNOSIS — D51.9 ANEMIA DUE TO VITAMIN B12 DEFICIENCY, UNSPECIFIED B12 DEFICIENCY TYPE: ICD-10-CM

## 2024-09-17 DIAGNOSIS — E03.9 HYPOTHYROIDISM, UNSPECIFIED TYPE: ICD-10-CM

## 2024-09-17 DIAGNOSIS — E53.8 B12 DEFICIENCY: ICD-10-CM

## 2024-09-17 DIAGNOSIS — M81.0 OSTEOPOROSIS, UNSPECIFIED OSTEOPOROSIS TYPE, UNSPECIFIED PATHOLOGICAL FRACTURE PRESENCE: ICD-10-CM

## 2024-09-17 DIAGNOSIS — Z00.00 HEALTH CARE MAINTENANCE: Primary | ICD-10-CM

## 2024-09-17 LAB
ALBUMIN SERPL BCP-MCNC: 4 G/DL (ref 3.4–5)
ALP SERPL-CCNC: 135 U/L (ref 33–136)
ALT SERPL W P-5'-P-CCNC: 17 U/L (ref 7–45)
ANION GAP SERPL CALC-SCNC: 13 MMOL/L (ref 10–20)
AST SERPL W P-5'-P-CCNC: 23 U/L (ref 9–39)
BILIRUB SERPL-MCNC: 0.5 MG/DL (ref 0–1.2)
BUN SERPL-MCNC: 18 MG/DL (ref 6–23)
CALCIUM SERPL-MCNC: 9.5 MG/DL (ref 8.6–10.6)
CHLORIDE SERPL-SCNC: 104 MMOL/L (ref 98–107)
CHOLEST SERPL-MCNC: 149 MG/DL (ref 0–199)
CHOLESTEROL/HDL RATIO: 4.1
CO2 SERPL-SCNC: 29 MMOL/L (ref 21–32)
CREAT SERPL-MCNC: 0.83 MG/DL (ref 0.5–1.05)
EGFRCR SERPLBLD CKD-EPI 2021: 72 ML/MIN/1.73M*2
GLUCOSE SERPL-MCNC: 113 MG/DL (ref 74–99)
HDLC SERPL-MCNC: 36.7 MG/DL
LDLC SERPL CALC-MCNC: 63 MG/DL
NON HDL CHOLESTEROL: 112 MG/DL (ref 0–149)
POTASSIUM SERPL-SCNC: 4.5 MMOL/L (ref 3.5–5.3)
PROT SERPL-MCNC: 7 G/DL (ref 6.4–8.2)
SODIUM SERPL-SCNC: 141 MMOL/L (ref 136–145)
TRIGL SERPL-MCNC: 249 MG/DL (ref 0–149)
TSH SERPL-ACNC: 0.62 MIU/L (ref 0.44–3.98)
VIT B12 SERPL-MCNC: 329 PG/ML (ref 211–911)
VLDL: 50 MG/DL (ref 0–40)

## 2024-09-17 PROCEDURE — 84443 ASSAY THYROID STIM HORMONE: CPT

## 2024-09-17 PROCEDURE — 99214 OFFICE O/P EST MOD 30 MIN: CPT | Performed by: INTERNAL MEDICINE

## 2024-09-17 PROCEDURE — 36415 COLL VENOUS BLD VENIPUNCTURE: CPT

## 2024-09-17 PROCEDURE — 80061 LIPID PANEL: CPT

## 2024-09-17 PROCEDURE — 3075F SYST BP GE 130 - 139MM HG: CPT | Performed by: INTERNAL MEDICINE

## 2024-09-17 PROCEDURE — 80053 COMPREHEN METABOLIC PANEL: CPT

## 2024-09-17 PROCEDURE — 3078F DIAST BP <80 MM HG: CPT | Performed by: INTERNAL MEDICINE

## 2024-09-17 PROCEDURE — 82607 VITAMIN B-12: CPT

## 2024-09-17 NOTE — PROGRESS NOTES
Subjective   Patient ID: Tricia Cottrell is a 78 y.o. female who presents for No chief complaint on file..    HPI  Follow-up examination no chest pain no shortness of breath left elbow with tingling she is off of the Prolia is taking her calcium and vitamin D bowel and bladder is normal discussed with vitamin B12 injections now monthly    Past medical history osteoporosis hypertension hyperlipidemia hypothyroidism    Medications noted and unchanged    Allergies noted and unchanged    Social history no tobacco no alcohol    Family history noted and unchanged    Prevention exercises regularly COVID vaccination recently discussed with colonoscopy follow-up GYN and mammogram follow-up next bone density    Depression screen not depressed    Review of Systems    Objective   /76   Wt 65.3 kg (144 lb)   BMI 24.72 kg/m²     Physical Exam vital signs noted alert and oriented x 3 NCAT PERRLA EOMI nares without discharge OP benign TM normal bilateral EAC clear bilateral no AC nodes no JVD or bruit no Thyromegaly chest clear to auscultations and percussion regular rate and rhythm S1-S2 without murmur gallop or rub abdomen soft nontender normal active bowel sounds LS spine normal curvature negative straight leg raise negative logroll negative SI joint extremities no clubbing cyanosis pulses DTR 2+ musculoskeletal left elbow full range show motion without discomfort thenar atrophy in the hand is still in a binder   Assessment/Plan impression anemia vitamin B12 hyperlipidemia osteoporosis hypothyroid hypertension    Plan follow-up with orthopedics as planned for the left elbow and neurology if needed for the hand she continues with the gabapentin 800 mg 3 times daily for the current time and off of the Prolia and calcium and vitamin D diet regular exercise increase water consumption check vitamin B12 level which was low prior to reinitiating monthly injections check comprehensive panel glucose potassium and kidney  function as well as liver function about low blood pressure blood pressure medicines check TSH advised on thyroid check lipid panel without send cholesterol profile then recheck 1 month for vitamin B12 injection integuments for regular visit TT 50 cc 26

## 2024-09-18 DIAGNOSIS — Z00.00 HEALTH CARE MAINTENANCE: ICD-10-CM

## 2024-09-19 RX ORDER — LEVOTHYROXINE SODIUM 100 UG/1
100 TABLET ORAL
Qty: 90 TABLET | Refills: 0 | Status: SHIPPED | OUTPATIENT
Start: 2024-09-19

## 2024-09-19 RX ORDER — ATORVASTATIN CALCIUM 10 MG/1
10 TABLET, FILM COATED ORAL NIGHTLY
Qty: 90 TABLET | Refills: 0 | Status: SHIPPED | OUTPATIENT
Start: 2024-09-19

## 2024-09-20 ENCOUNTER — APPOINTMENT (OUTPATIENT)
Dept: PRIMARY CARE | Facility: CLINIC | Age: 79
End: 2024-09-20
Payer: MEDICARE

## 2024-09-24 ENCOUNTER — TREATMENT (OUTPATIENT)
Dept: OCCUPATIONAL THERAPY | Facility: HOSPITAL | Age: 79
End: 2024-09-24
Payer: MEDICARE

## 2024-09-24 DIAGNOSIS — S42.411A CLOSED FRACTURE OF SUPRACONDYLAR HUMERUS, RIGHT, INITIAL ENCOUNTER: ICD-10-CM

## 2024-09-24 PROCEDURE — 97110 THERAPEUTIC EXERCISES: CPT | Mod: GO | Performed by: OCCUPATIONAL THERAPIST

## 2024-09-24 PROCEDURE — 97140 MANUAL THERAPY 1/> REGIONS: CPT | Mod: GO | Performed by: OCCUPATIONAL THERAPIST

## 2024-09-24 NOTE — PROGRESS NOTES
Occupational Therapy  Occupational Therapy Orthopedic Treatment    Patient Name: Gertrudis Cottrell  MRN: [id number]  Today's Date: 9/24/2024       Insurance Type: Payor: MEDICARE / Plan: MEDICARE PART A AND B / Product Type: *No Product type* /   Time:     Time:  Time Calculation  Start Time: 0845  Stop Time: 0930  Time Calculation (min): 45 min  OT Therapeutic Procedures Time Entry  Manual Therapy Time Entry: 35  Therapeutic Exercise Time Entry: 10    Insurance Type: Payor: MEDICARE / Plan: MEDICARE PART A AND B / Product Type: *No Product type* /     Insurance:  Visit number: 12  General:  Reason for visit: L elbow  Referred by: Hilda Patton PA-C    Current Problem  1. Closed fracture of supracondylar humerus, right, initial encounter  Follow Up In Occupational Therapy        Insurance Type: Payor: MEDICARE / Plan: MEDICARE PART A AND B / Product Type: *No Product type* /     Precautions: per post op protocol     Medical History Form: Reviewed (scanned into chart)    Subjective:  I have been working on the exercises. I did the hand splint twice per day and the elbow one time per day.     Chief Complaint: L arm/hand  BASILIO: Fall   DOI: 05/12/2024  DOS: 05/30/2024 ORIF L elbow    Hand Dominance: Right    Current Condition since injury:   better      PAIN     Location: L elbow and hand  Intensity: 1/10 up to 3/10  Description: sharp  Aggravating Factors:  functional use  Relieving Factors:  Rest     Relevant Information (PMH & Previous Tests/Imaging): reviewed in chart     Prior Level of Function (PLOF)  Exercise/Physical Activity: jigsaw puzzles  Work/School: retired  Current ADL/IADL Status: independent with homemaking     Patients Living Environment: Reviewed and no concern    Primary Language: English    Pt goals for therapy: improve functional use of L hand for cooking, cleaning, dressing, jigsaw puzzles, and various household chores    Red Flags: Do you have any of the following? No  Fever/chills, unexplained  weight changes, dizziness/fainting, unexplained change in bowel or bladder functions, unexplained malaise or muscle weakness, night pain/sweats, numbness or tingling    Objective:     Distance from tips to palm -; MCP flexion   2nd: 1.5 cm ; 65 was 60  - post treatment  3rd: 2 cm  ;  65 was 60  4th: 2.5   ;  70 was 60     5th: 3 cm  ; 55   Thumb opposition to tip of 4th   Wrist extension/flexion 75 /62   Wrist ulnar/radial deviation 20/10  Forearm pronation/supination 60/60  Elbow extension/flexion: 25 was 28 -115   ;  post treatment     Physical Observation: significant limitations with elbow and digit range of motion, MCPs most limited, extensor habitus posture of hand    Edema: moderate    Sensory: no complaints of sensory issues     Good rehab potential    Outcome Measures:  Quick DASH: 47.72    EDUCATION: home exercise program, plan of care, activity modifications, pain management, and injury pathology     Goals:  Active       OT Goals       OT Goal 1       Start:  08/02/24    Expected End:  09/13/24       Achieve tips to palm for grasping objects in hand.         OT Goal 2       Start:  08/02/24    Expected End:  09/13/24       Improve elbow flexion to 120 degrees for dressing tasks.         OT Goal 3       Start:  08/02/24    Expected End:  10/11/24       Improve elbow extension to 15 degrees for reaching to grasp objects.         OT Goal 4       Start:  08/02/24    Expected End:  10/11/24       Improve Quick DASH to 15%.           OT Goal 5       Start:  08/02/24    Expected End:  10/11/24       Improve  strength to 70% of unaffected side for opening jars.           Plan of care was developed with input and agreement by the patient.    Treatments:      Therapeutic Exercise:   10 min  HEP education and completion: MCP flexion stretch, IP flexion stretch, composite digit flexion stretch, prayer stretch, wrist flexion stretch, and forearm pronation stretch.  Digits positioned into end range digit flexion  with coban and use of heating pad to improve tissue extensibility and improve range of motion.   Place and hold into end range digit flexion.     Manual Therapy    35 min  Therapist performed manual digit, wrist, and elbow range of motion.  Therapist performed A/P joint mobilization combined with MCP flexion.  Therapist positioned MCPs in end range flexion with patient making active fist.  Therapist performed manual soft tissue mobilization along elbow flexors.  Therapist perform elbow distraction combined with elbow extension.      Assessment:       Continued range of motion exercises today. Patient had improved post treatment digit and elbow range of motion. Patient to follow up Friday.    Plan:      Planned Interventions include: therapeutic exercise, therapeutic activity, self-care home management, manual therapy, therapeutic activities, gait training, neuromuscular coordination, vasopneumatic, dry needling, aquatic therapy, electric stimulation, fluidotherapy, ultrasound, kinesiotaping, orthosis fabrication, wound care  Frequency: 1-2 x Week  Duration: 12 Weeks      Felipe Lee OT

## 2024-09-27 ENCOUNTER — TREATMENT (OUTPATIENT)
Dept: OCCUPATIONAL THERAPY | Facility: HOSPITAL | Age: 79
End: 2024-09-27
Payer: MEDICARE

## 2024-09-27 DIAGNOSIS — S42.411A CLOSED FRACTURE OF SUPRACONDYLAR HUMERUS, RIGHT, INITIAL ENCOUNTER: ICD-10-CM

## 2024-09-27 PROCEDURE — 97140 MANUAL THERAPY 1/> REGIONS: CPT | Mod: GO | Performed by: OCCUPATIONAL THERAPIST

## 2024-09-27 PROCEDURE — 97110 THERAPEUTIC EXERCISES: CPT | Mod: GO | Performed by: OCCUPATIONAL THERAPIST

## 2024-09-27 NOTE — PROGRESS NOTES
Occupational Therapy  Occupational Therapy Orthopedic Treatment    Patient Name: Gertrudis Cottrell  MRN: [id number]  Today's Date: 9/27/2024       Insurance Type: Payor: MEDICARE / Plan: MEDICARE PART A AND B / Product Type: *No Product type* /   Time:  Time:  Time Calculation  Start Time: 0930  Stop Time: 1015  Time Calculation (min): 45 min  OT Therapeutic Procedures Time Entry  Manual Therapy Time Entry: 30  Therapeutic Exercise Time Entry: 15         Insurance Type: Payor: MEDICARE / Plan: MEDICARE PART A AND B / Product Type: *No Product type* /     Insurance:  Visit number: 13  General:  Reason for visit: L elbow  Referred by: Hilda Patton PA-C    Current Problem  1. Closed fracture of supracondylar humerus, right, initial encounter  Follow Up In Occupational Therapy        Insurance Type: Payor: MEDICARE / Plan: MEDICARE PART A AND B / Product Type: *No Product type* /     Precautions: per post op protocol     Medical History Form: Reviewed (scanned into chart)    Subjective:  I have been working on the exercises. I did the hand splint twice per day and the elbow one time per day.     Chief Complaint: L arm/hand  BASILIO: Fall   DOI: 05/12/2024  DOS: 05/30/2024 ORIF L elbow    Hand Dominance: Right    Current Condition since injury:   better      PAIN     Location: L elbow and hand  Intensity: 1/10 up to 3/10  Description: sharp  Aggravating Factors:  functional use  Relieving Factors:  Rest     Relevant Information (PMH & Previous Tests/Imaging): reviewed in chart     Prior Level of Function (PLOF)  Exercise/Physical Activity: jigsaw puzzles  Work/School: retired  Current ADL/IADL Status: independent with homemaking     Patients Living Environment: Reviewed and no concern    Primary Language: English    Pt goals for therapy: improve functional use of L hand for cooking, cleaning, dressing, jigsaw puzzles, and various household chores    Red Flags: Do you have any of the following? No  Fever/chills, unexplained  weight changes, dizziness/fainting, unexplained change in bowel or bladder functions, unexplained malaise or muscle weakness, night pain/sweats, numbness or tingling    Objective:     Distance from tips to palm -; MCP flexion   2nd: 1.5 cm ; 65 was 60  - post treatment  3rd: 2 cm  ;  65 was 60  4th: 2.5   ;  70 was 60     5th: 3 cm  ; 55   Thumb opposition to tip of 4th   Wrist extension/flexion 75 /62   Wrist ulnar/radial deviation 20/10  Forearm pronation/supination 60/60  Elbow extension/flexion: 25 was 28 -115   ;  post treatment     Physical Observation: significant limitations with elbow and digit range of motion, MCPs most limited, extensor habitus posture of hand    Edema: moderate    Sensory: no complaints of sensory issues     Good rehab potential    Outcome Measures:  Quick DASH: 47.72    EDUCATION: home exercise program, plan of care, activity modifications, pain management, and injury pathology     Goals:  Active       OT Goals       OT Goal 1       Start:  08/02/24    Expected End:  09/13/24       Achieve tips to palm for grasping objects in hand.         OT Goal 2       Start:  08/02/24    Expected End:  09/13/24       Improve elbow flexion to 120 degrees for dressing tasks.         OT Goal 3       Start:  08/02/24    Expected End:  10/11/24       Improve elbow extension to 15 degrees for reaching to grasp objects.         OT Goal 4       Start:  08/02/24    Expected End:  10/11/24       Improve Quick DASH to 15%.           OT Goal 5       Start:  08/02/24    Expected End:  10/11/24       Improve  strength to 70% of unaffected side for opening jars.           Plan of care was developed with input and agreement by the patient.    Treatments:      Therapeutic Exercise:   15 min  HEP education and completion: MCP flexion stretch, IP flexion stretch, composite digit flexion stretch, prayer stretch, wrist flexion stretch, and forearm pronation stretch.  Digits positioned into end range digit flexion  with coban and use of heating pad to improve tissue extensibility and improve range of motion.   Place and hold into end range digit flexion.   Wrist maze for improved dynamic wrist range of motion.  Wrist extension and flexion with red flexbar.  Forearm pronation and supination with red flexbar.  Manual Therapy    30 min  Therapist performed manual digit, wrist, and elbow range of motion.  Therapist performed A/P joint mobilization combined with MCP flexion.  Therapist positioned MCPs in end range flexion with patient making active fist.  Therapist performed manual soft tissue mobilization along elbow flexors.  Therapist perform elbow distraction combined with elbow extension.      Assessment:     Advanced strengthening today. Patient reports good compliance with mobilization orthosis. Patient to follow up next week.     Plan:      Planned Interventions include: therapeutic exercise, therapeutic activity, self-care home management, manual therapy, therapeutic activities, gait training, neuromuscular coordination, vasopneumatic, dry needling, aquatic therapy, electric stimulation, fluidotherapy, ultrasound, kinesiotaping, orthosis fabrication, wound care  Frequency: 1-2 x Week  Duration: 12 Weeks      Felipe Lee, OT

## 2024-09-30 DIAGNOSIS — Z00.00 HEALTH CARE MAINTENANCE: ICD-10-CM

## 2024-10-01 ENCOUNTER — TREATMENT (OUTPATIENT)
Dept: OCCUPATIONAL THERAPY | Facility: HOSPITAL | Age: 79
End: 2024-10-01
Payer: MEDICARE

## 2024-10-01 DIAGNOSIS — S42.411A CLOSED FRACTURE OF SUPRACONDYLAR HUMERUS, RIGHT, INITIAL ENCOUNTER: ICD-10-CM

## 2024-10-01 PROCEDURE — 97140 MANUAL THERAPY 1/> REGIONS: CPT | Mod: GO | Performed by: OCCUPATIONAL THERAPIST

## 2024-10-01 PROCEDURE — 97110 THERAPEUTIC EXERCISES: CPT | Mod: GO | Performed by: OCCUPATIONAL THERAPIST

## 2024-10-01 NOTE — PROGRESS NOTES
Occupational Therapy  Occupational Therapy Orthopedic Treatment    Patient Name: Gertrudis Cottrell  MRN: [id number]  Today's Date: 10/1/2024  Time:  Time Calculation  Start Time: 0845  Stop Time: 0930  Time Calculation (min): 45 min  OT Therapeutic Procedures Time Entry  Manual Therapy Time Entry: 30  Therapeutic Exercise Time Entry: 15    Insurance: MEDICARE A/B / NO AUTH / [place] VISITS - 0 USED OT 8/2/24 / [employer] SUPP ACTIVE / PER RTE      Insurance Type: Payor: MEDICARE / Plan: MEDICARE PART A AND B / Product Type: *No Product type* /       Insurance:  Visit number: 14  General:  Reason for visit: L elbow  Referred by: Hilda Patton PA-C    Current Problem  1. Closed fracture of supracondylar humerus, right, initial encounter  Follow Up In Occupational Therapy        Insurance Type: Payor: MEDICARE / Plan: MEDICARE PART A AND B / Product Type: *No Product type* /     Precautions: per post op protocol     Medical History Form: Reviewed (scanned into chart)    Subjective:  I have been working on the exercises. I did the hand splint twice per day and the elbow one time per day.     Chief Complaint: L arm/hand  BASILIO: Fall   DOI: 05/12/2024  DOS: 05/30/2024 ORIF L elbow    Hand Dominance: Right    Current Condition since injury:   better      PAIN     Location: L elbow and hand  Intensity: 1/10 up to 3/10  Description: sharp  Aggravating Factors:  functional use  Relieving Factors:  Rest     Relevant Information (PMH & Previous Tests/Imaging): reviewed in chart     Prior Level of Function (PLOF)  Exercise/Physical Activity: jigsaw puzzles  Work/School: retired  Current ADL/IADL Status: independent with homemaking     Patients Living Environment: Reviewed and no concern    Primary Language: English    Pt goals for therapy: improve functional use of L hand for cooking, cleaning, dressing, jigsaw puzzles, and various household chores    Red Flags: Do you have any of the following? No  Fever/chills, unexplained weight changes,  dizziness/fainting, unexplained change in bowel or bladder functions, unexplained malaise or muscle weakness, night pain/sweats, numbness or tingling    Objective:     Distance from tips to palm -; MCP flexion ; achieved 1 cm post treatment for all digits  2nd: 1.5 cm ; 65   - post treatment  3rd: 2 cm  ;  65    4th: 2.5   ;  70   5th: 3 cm  ; 55   Thumb opposition to tip of 4th   Wrist extension/flexion 75 /62   Wrist ulnar/radial deviation 20/10  Forearm pronation/supination 70 was 60/75 was 60  Elbow extension/flexion: 25  -115   ;  post treatment     Physical Observation: significant limitations with elbow and digit range of motion, MCPs most limited, extensor habitus posture of hand    Edema: moderate    Sensory: no complaints of sensory issues     Good rehab potential    Outcome Measures:  Quick DASH: 47.72    EDUCATION: home exercise program, plan of care, activity modifications, pain management, and injury pathology     Goals:  Active       OT Goals       OT Goal 1       Start:  08/02/24    Expected End:  09/13/24       Achieve tips to palm for grasping objects in hand.         OT Goal 2       Start:  08/02/24    Expected End:  09/13/24       Improve elbow flexion to 120 degrees for dressing tasks.         OT Goal 3       Start:  08/02/24    Expected End:  10/11/24       Improve elbow extension to 15 degrees for reaching to grasp objects.         OT Goal 4       Start:  08/02/24    Expected End:  10/11/24       Improve Quick DASH to 15%.           OT Goal 5       Start:  08/02/24    Expected End:  10/11/24       Improve  strength to 70% of unaffected side for opening jars.           Plan of care was developed with input and agreement by the patient.    Treatments:      Therapeutic Exercise:   15 min  HEP education and completion: MCP flexion stretch, IP flexion stretch, composite digit flexion stretch, prayer stretch, wrist flexion stretch, and forearm pronation stretch.  Digits positioned into end range  digit flexion with coban and use of heating pad to improve tissue extensibility and improve range of motion.   Place and hold into end range digit flexion.   Sponge pickup with yellow resistance clip.  Wrist extension and flexion with red flexbar.  Forearm pronation and supination with red flexbar.  Manual Therapy    30 min  Therapist performed manual digit, wrist, and elbow range of motion.  Therapist performed A/P joint mobilization combined with MCP flexion.  Therapist positioned MCPs in end range flexion with patient making active fist.  Therapist performed manual soft tissue mobilization along elbow flexors.  Therapist perform elbow distraction combined with elbow extension.      Assessment:    Forearm rotation improved today. Patient to continue working on end range digit and elbow range of motion. Patient to follow up Friday.     Plan:      Planned Interventions include: therapeutic exercise, therapeutic activity, self-care home management, manual therapy, therapeutic activities, gait training, neuromuscular coordination, vasopneumatic, dry needling, aquatic therapy, electric stimulation, fluidotherapy, ultrasound, kinesiotaping, orthosis fabrication, wound care  Frequency: 1-2 x Week  Duration: 12 Weeks      Felipe Lee OT

## 2024-10-03 ENCOUNTER — PATIENT MESSAGE (OUTPATIENT)
Dept: ORTHOPEDIC SURGERY | Facility: CLINIC | Age: 79
End: 2024-10-03
Payer: MEDICARE

## 2024-10-03 DIAGNOSIS — S42.411A CLOSED FRACTURE OF SUPRACONDYLAR HUMERUS, RIGHT, INITIAL ENCOUNTER: ICD-10-CM

## 2024-10-03 RX ORDER — LOSARTAN POTASSIUM 25 MG/1
25 TABLET ORAL DAILY
Qty: 90 TABLET | Refills: 0 | Status: SHIPPED | OUTPATIENT
Start: 2024-10-03

## 2024-10-04 ENCOUNTER — TREATMENT (OUTPATIENT)
Dept: OCCUPATIONAL THERAPY | Facility: HOSPITAL | Age: 79
End: 2024-10-04
Payer: MEDICARE

## 2024-10-04 ENCOUNTER — APPOINTMENT (OUTPATIENT)
Dept: PRIMARY CARE | Facility: CLINIC | Age: 79
End: 2024-10-04
Payer: MEDICARE

## 2024-10-04 DIAGNOSIS — S42.411A CLOSED FRACTURE OF SUPRACONDYLAR HUMERUS, RIGHT, INITIAL ENCOUNTER: ICD-10-CM

## 2024-10-04 PROCEDURE — 97140 MANUAL THERAPY 1/> REGIONS: CPT | Mod: GO | Performed by: OCCUPATIONAL THERAPIST

## 2024-10-04 PROCEDURE — 97110 THERAPEUTIC EXERCISES: CPT | Mod: GO | Performed by: OCCUPATIONAL THERAPIST

## 2024-10-04 RX ORDER — GABAPENTIN 800 MG/1
800 TABLET ORAL 3 TIMES DAILY
Qty: 90 TABLET | Refills: 3 | Status: SHIPPED | OUTPATIENT
Start: 2024-10-04 | End: 2024-11-03

## 2024-10-04 NOTE — PROGRESS NOTES
Occupational Therapy  Occupational Therapy Orthopedic Progress Note    Patient Name: Gertrudis Cottrell  MRN: [id number]  Today's Date: 10/4/2024  Time:  Time:  Time Calculation  Start Time: 0915  Stop Time: 1005  Time Calculation (min): 50 min  OT Therapeutic Procedures Time Entry  Manual Therapy Time Entry: 25  Therapeutic Exercise Time Entry: 20       Insurance: MEDICARE A/B / NO AUTH / [place] VISITS - 0 USED OT 8/2/24 / [employer] SUPP ACTIVE / PER RTE      Insurance Type: Payor: MEDICARE / Plan: MEDICARE PART A AND B / Product Type: *No Product type* /       Insurance:  Visit number: 15  General:  Reason for visit: L elbow  Referred by: Hilda Patton PA-C    Current Problem  1. Closed fracture of supracondylar humerus, right, initial encounter  Follow Up In Occupational Therapy        Insurance Type: Payor: MEDICARE / Plan: MEDICARE PART A AND B / Product Type: *No Product type* /     Precautions: per post op protocol     Medical History Form: Reviewed (scanned into chart)    Subjective:  I have been working on the putty.    Chief Complaint: L arm/hand  BASILIO: Fall   DOI: 05/12/2024  DOS: 05/30/2024 ORIF L elbow    Hand Dominance: Right    Current Condition since injury:   better      PAIN     Location: L elbow and hand  Intensity: 1/10 up to 3/10  Description: sharp  Aggravating Factors:  functional use  Relieving Factors:  Rest     Relevant Information (PMH & Previous Tests/Imaging): reviewed in chart     Prior Level of Function (PLOF)  Exercise/Physical Activity: jigsaw puzzles  Work/School: retired  Current ADL/IADL Status: independent with homemaking     Patients Living Environment: Reviewed and no concern    Primary Language: English    Pt goals for therapy: improve functional use of L hand for cooking, cleaning, dressing, jigsaw puzzles, and various household chores    Red Flags: Do you have any of the following? No  Fever/chills, unexplained weight changes, dizziness/fainting, unexplained change in bowel or bladder  functions, unexplained malaise or muscle weakness, night pain/sweats, numbness or tingling    Objective:     Distance from tips to palm -; MCP flexion ; achieved 1 cm post treatment for all digits  2nd: 1.5 cm ; 65   - post treatment  3rd: 2 cm  ;  65    4th: 2.5   ;  70   5th: 3 cm  ; 55   Thumb opposition to tip of 4th   Wrist extension/flexion 75 /62   Wrist ulnar/radial deviation 20/10  Forearm pronation/supination 70 was 60/75 was 60  Elbow extension/flexion: 25  -115   ;  post treatment     Physical Observation: significant limitations with elbow and digit range of motion, MCPs most limited, extensor habitus posture of hand    Edema: moderate    Sensory: no complaints of sensory issues     Good rehab potential    Outcome Measures:  Quick DASH: 34.09 was 47.72    EDUCATION: home exercise program, plan of care, activity modifications, pain management, and injury pathology     Goals:  Active       OT Goals       OT Goal 1       Start:  08/02/24    Expected End:  09/13/24       Achieve tips to palm for grasping objects in hand.         OT Goal 2       Start:  08/02/24    Expected End:  09/13/24       Improve elbow flexion to 120 degrees for dressing tasks.         OT Goal 3       Start:  08/02/24    Expected End:  10/11/24       Improve elbow extension to 15 degrees for reaching to grasp objects.         OT Goal 4       Start:  08/02/24    Expected End:  10/11/24       Improve Quick DASH to 15%.           OT Goal 5       Start:  08/02/24    Expected End:  10/11/24       Improve  strength to 70% of unaffected side for opening jars.           Plan of care was developed with input and agreement by the patient.    Treatments:      Therapeutic Exercise:   20 min  HEP education and completion: MCP flexion stretch, IP flexion stretch, composite digit flexion stretch, prayer stretch, wrist flexion stretch, and forearm pronation stretch.  Digits positioned into end range digit flexion with coban and use of heating pad  to improve tissue extensibility and improve range of motion.   Place and hold into end range digit flexion.   Sponge pickup with hand gripper on light resistance.  Wrist extension and flexion with green flexbar.  Forearm pronation and supination with green flexbar.  Manual Therapy    30 min  Therapist performed manual digit, wrist, and elbow range of motion.  Therapist performed A/P joint mobilization combined with MCP flexion.  Therapist positioned MCPs in end range flexion with patient making active fist.  Therapist performed manual soft tissue mobilization along elbow flexors.  Therapist perform elbow distraction combined with elbow extension.      Assessment:    Quick DASH score updated and improved. Patient continues to report good compliance with home program and wear of mobilization splints. Patient to bring orthosis next visit for adjustments. Patient had good activity tolerance to exercises today. Patient to benefit from continued therapy services to progress end range elbow, forearm and digit range of motion and strengthening for improved functional use of L arm.     Plan:      Planned Interventions include: therapeutic exercise, therapeutic activity, self-care home management, manual therapy, therapeutic activities, gait training, neuromuscular coordination, vasopneumatic, dry needling, aquatic therapy, electric stimulation, fluidotherapy, ultrasound, kinesiotaping, orthosis fabrication, wound care  Frequency: 1-2 x Week  Duration: 12 Weeks      Felipe Lee, OT

## 2024-10-08 ENCOUNTER — APPOINTMENT (OUTPATIENT)
Dept: PRIMARY CARE | Facility: CLINIC | Age: 79
End: 2024-10-08
Payer: MEDICARE

## 2024-10-08 ENCOUNTER — TREATMENT (OUTPATIENT)
Dept: OCCUPATIONAL THERAPY | Facility: HOSPITAL | Age: 79
End: 2024-10-08
Payer: MEDICARE

## 2024-10-08 DIAGNOSIS — S42.411A CLOSED FRACTURE OF SUPRACONDYLAR HUMERUS, RIGHT, INITIAL ENCOUNTER: Primary | ICD-10-CM

## 2024-10-08 DIAGNOSIS — Z00.00 HEALTH CARE MAINTENANCE: ICD-10-CM

## 2024-10-08 DIAGNOSIS — E53.8 B12 DEFICIENCY: Primary | ICD-10-CM

## 2024-10-08 PROCEDURE — 96372 THER/PROPH/DIAG INJ SC/IM: CPT | Performed by: INTERNAL MEDICINE

## 2024-10-08 PROCEDURE — 97140 MANUAL THERAPY 1/> REGIONS: CPT | Mod: GO | Performed by: OCCUPATIONAL THERAPIST

## 2024-10-08 PROCEDURE — 97110 THERAPEUTIC EXERCISES: CPT | Mod: GO | Performed by: OCCUPATIONAL THERAPIST

## 2024-10-08 RX ORDER — CYANOCOBALAMIN 1000 UG/ML
1000 INJECTION, SOLUTION INTRAMUSCULAR; SUBCUTANEOUS ONCE
Status: COMPLETED | OUTPATIENT
Start: 2024-10-08 | End: 2024-10-08

## 2024-10-08 NOTE — PROGRESS NOTES
Occupational Therapy  Occupational Therapy Orthopedic Treatment     Patient Name: Gertrudis Cotterll  MRN: [id number]  Today's Date: 10/8/2024    Insurance: MEDICARE A/B / NO AUTH / [place] VISITS - 0 USED OT 8/2/24 / [employer] SUPP ACTIVE / PER RTE    Time:  Time Calculation  Start Time: 0845  Stop Time: 0930  Time Calculation (min): 45 min  OT Therapeutic Procedures Time Entry  Manual Therapy Time Entry: 30  Therapeutic Exercise Time Entry: 15    Insurance Type: Payor: MEDICARE / Plan: MEDICARE PART A AND B / Product Type: *No Product type* /     Insurance:  Visit number: 16  General:  Reason for visit: L elbow  Referred by: Hilda Patton PA-C    Current Problem  1. Closed fracture of supracondylar humerus, right, initial encounter            Insurance Type: Payor: MEDICARE / Plan: MEDICARE PART A AND B / Product Type: *No Product type* /     Precautions: per post op protocol     Medical History Form: Reviewed (scanned into chart)    Subjective:  My pain was 0/10 at the end of last week but Sunday my elbow started really bothering me.      Chief Complaint: L arm/hand  BASILIO: Fall   DOI: 05/12/2024  DOS: 05/30/2024 ORIF L elbow    Hand Dominance: Right    Current Condition since injury:   better      PAIN     Location: L elbow and hand  Intensity: 1/10 up to 3/10  Description: sharp  Aggravating Factors:  functional use  Relieving Factors:  Rest     Relevant Information (PMH & Previous Tests/Imaging): reviewed in chart     Prior Level of Function (PLOF)  Exercise/Physical Activity: jigsaw puzzles  Work/School: retired  Current ADL/IADL Status: independent with homemaking     Patients Living Environment: Reviewed and no concern    Primary Language: English    Pt goals for therapy: improve functional use of L hand for cooking, cleaning, dressing, jigsaw puzzles, and various household chores    Red Flags: Do you have any of the following? No  Fever/chills, unexplained weight changes, dizziness/fainting, unexplained change in  bowel or bladder functions, unexplained malaise or muscle weakness, night pain/sweats, numbness or tingling    Objective:     Distance from tips to palm -; MCP flexion ; achieved 1 cm post treatment for all digits  2nd: 1.5 cm ; 65   - post treatment 70  3rd: 2 cm  ;  65  - post treatment 70  4th: 2.5   ;  70 - post treatment 75  5th: 3 cm  ; 55 - - post treatment 60  Thumb opposition to tip of 4th   Wrist extension/flexion 75 /62   Wrist ulnar/radial deviation 20/10  Forearm pronation/supination 70 was 60/75 was 60  Elbow extension/flexion: 25  -115   ;  post treatment     Physical Observation: significant limitations with elbow and digit range of motion, MCPs most limited, extensor habitus posture of hand    Edema: moderate    Sensory: no complaints of sensory issues     Good rehab potential    Outcome Measures:  Quick DASH: 34.09     EDUCATION: home exercise program, plan of care, activity modifications, pain management, and injury pathology     Goals:  Active       OT Goals       OT Goal 1       Start:  08/02/24    Expected End:  09/13/24       Achieve tips to palm for grasping objects in hand.         OT Goal 2       Start:  08/02/24    Expected End:  09/13/24       Improve elbow flexion to 120 degrees for dressing tasks.         OT Goal 3       Start:  08/02/24    Expected End:  10/11/24       Improve elbow extension to 15 degrees for reaching to grasp objects.         OT Goal 4       Start:  08/02/24    Expected End:  10/11/24       Improve Quick DASH to 15%.           OT Goal 5       Start:  08/02/24    Expected End:  10/11/24       Improve  strength to 70% of unaffected side for opening jars.           Plan of care was developed with input and agreement by the patient.    Treatments:      Therapeutic Exercise:   15 min  HEP education and completion: MCP flexion stretch, IP flexion stretch, composite digit flexion stretch, prayer stretch, wrist flexion stretch, and forearm pronation stretch.  Digits  positioned into end range digit flexion with coban and use of heating pad to improve tissue extensibility and improve range of motion.   Place and hold into end range digit flexion.   Sponge pickup with hand gripper on light resistance. Did not complete today  Wrist extension and flexion with green flexbar. Did not complete today  Forearm pronation and supination with green flexbar. Did not complete today  Therapist adjusted MCP flexion orthosis with further wrist extension and further clearing DPC for full MCP flexion.   Patient educated on patterning and avoidance of wrist flexion posturing.     Manual Therapy    30 min  Therapist performed manual digit, wrist, and elbow range of motion.  Therapist performed A/P joint mobilization combined with MCP flexion.  Therapist positioned MCPs in end range flexion with patient making active fist.  Therapist performed manual soft tissue mobilization along elbow flexors. Did not complete today  Therapist perform elbow distraction combined with elbow extension. Did not complete today      Assessment:     Adjusted static progressive flexion orthosis. Patient reports continued good carryover and completion of home program. Patient to follow up Friday.     Plan:      Planned Interventions include: therapeutic exercise, therapeutic activity, self-care home management, manual therapy, therapeutic activities, gait training, neuromuscular coordination, vasopneumatic, dry needling, aquatic therapy, electric stimulation, fluidotherapy, ultrasound, kinesiotaping, orthosis fabrication, wound care  Frequency: 1-2 x Week  Duration: 12 Weeks      Felipe Lee OT

## 2024-10-11 ENCOUNTER — TREATMENT (OUTPATIENT)
Dept: OCCUPATIONAL THERAPY | Facility: HOSPITAL | Age: 79
End: 2024-10-11
Payer: MEDICARE

## 2024-10-11 DIAGNOSIS — S42.411A CLOSED FRACTURE OF SUPRACONDYLAR HUMERUS, RIGHT, INITIAL ENCOUNTER: Primary | ICD-10-CM

## 2024-10-11 PROCEDURE — 97140 MANUAL THERAPY 1/> REGIONS: CPT | Mod: GO | Performed by: OCCUPATIONAL THERAPIST

## 2024-10-11 PROCEDURE — 97110 THERAPEUTIC EXERCISES: CPT | Mod: GO | Performed by: OCCUPATIONAL THERAPIST

## 2024-10-11 NOTE — PROGRESS NOTES
Occupational Therapy  Occupational Therapy Orthopedic Treatment     Patient Name: Gertrudis Cottrell  MRN: [id number]  Today's Date: 10/11/2024    Insurance: MEDICARE A/B / NO AUTH / [place] VISITS - 0 USED OT 8/2/24 / [employer] SUPP ACTIVE / PER RTE    Time:  Time Calculation  Start Time: 0950  Stop Time: 1030  Time Calculation (min): 40 min  OT Therapeutic Procedures Time Entry  Manual Therapy Time Entry: 25  Therapeutic Exercise Time Entry: 15Time:  Time Calculation  Start Time: 0950  Stop Time: 1030  Time Calculation (min): 40 min  OT Therapeutic Procedures Time Entry  Manual Therapy Time Entry: 25  Therapeutic Exercise Time Entry: 15       Insurance:  Visit number: 16  General:  Reason for visit: L elbow  Referred by: Hilda Patton PA-C    Current Problem  1. Closed fracture of supracondylar humerus, right, initial encounter              Insurance Type: Payor: MEDICARE / Plan: MEDICARE PART A AND B / Product Type: *No Product type* /     Precautions: per post op protocol     Medical History Form: Reviewed (scanned into chart)    Subjective:  My pain was 0/10 at the end of last week but Sunday my elbow started really bothering me.      Chief Complaint: L arm/hand  BASILIO: Fall   DOI: 05/12/2024  DOS: 05/30/2024 ORIF L elbow    Hand Dominance: Right    Current Condition since injury:   better      PAIN     Location: L elbow and hand  Intensity: 1/10 up to 3/10  Description: sharp  Aggravating Factors:  functional use  Relieving Factors:  Rest     Relevant Information (PMH & Previous Tests/Imaging): reviewed in chart     Prior Level of Function (PLOF)  Exercise/Physical Activity: jigsaw puzzles  Work/School: retired  Current ADL/IADL Status: independent with homemaking     Patients Living Environment: Reviewed and no concern    Primary Language: English    Pt goals for therapy: improve functional use of L hand for cooking, cleaning, dressing, jigsaw puzzles, and various household chores    Red Flags: Do you have any of the  following? No  Fever/chills, unexplained weight changes, dizziness/fainting, unexplained change in bowel or bladder functions, unexplained malaise or muscle weakness, night pain/sweats, numbness or tingling    Objective:     Distance from tips to palm -; MCP flexion ; achieved 1 cm post treatment for all digits  2nd: 1.5 cm ; 65   - post treatment 70  3rd: 2 cm  ;  65  - post treatment 70  4th: 2.5   ;  70 - post treatment 75  5th: 3 cm  ; 55 - - post treatment 60  Thumb opposition to tip of 4th   Wrist extension/flexion 75 /62   Wrist ulnar/radial deviation 20/10  Forearm pronation/supination 70 was 60/75 was 60  Elbow extension/flexion: 25  -115   ;  post treatment     Physical Observation: significant limitations with elbow and digit range of motion, MCPs most limited, extensor habitus posture of hand    Edema: moderate    Sensory: no complaints of sensory issues     Good rehab potential    Outcome Measures:  Quick DASH: 34.09     EDUCATION: home exercise program, plan of care, activity modifications, pain management, and injury pathology     Goals:  Active       OT Goals       OT Goal 1       Start:  08/02/24    Expected End:  09/13/24       Achieve tips to palm for grasping objects in hand.         OT Goal 2       Start:  08/02/24    Expected End:  09/13/24       Improve elbow flexion to 120 degrees for dressing tasks.         OT Goal 3       Start:  08/02/24    Expected End:  10/11/24       Improve elbow extension to 15 degrees for reaching to grasp objects.         OT Goal 4       Start:  08/02/24    Expected End:  10/11/24       Improve Quick DASH to 15%.           OT Goal 5       Start:  08/02/24    Expected End:  10/11/24       Improve  strength to 70% of unaffected side for opening jars.           Plan of care was developed with input and agreement by the patient.    Treatments:      Therapeutic Exercise:   15 min  HEP education and completion: MCP flexion stretch, IP flexion stretch, composite digit  flexion stretch, prayer stretch, wrist flexion stretch, and forearm pronation stretch.  Digits positioned into end range digit flexion with coban and use of heating pad to improve tissue extensibility and improve range of motion.   Place and hold into end range digit flexion.   Sponge pickup with hand gripper on light resistance. Did not complete today  Wrist extension and flexion with green flexbar.    Forearm pronation and supination with green flexbar.   Therapist adjusted MCP flexion orthosis with further wrist extension and further clearing DPC for full MCP flexion.   Patient educated on patterning and avoidance of wrist flexion posturing.     Manual Therapy    25 min  Therapist performed manual digit, wrist, and elbow range of motion.  Therapist performed A/P joint mobilization combined with MCP flexion.  Therapist positioned MCPs in end range flexion with patient making active fist.  Therapist performed manual soft tissue mobilization along elbow flexors. Did not complete today  Therapist perform elbow distraction combined with elbow extension. Did not complete today      Assessment:    Continued strengthening and range of motion exercises today. Patient reports good completion with home program.     Plan:      Planned Interventions include: therapeutic exercise, therapeutic activity, self-care home management, manual therapy, therapeutic activities, gait training, neuromuscular coordination, vasopneumatic, dry needling, aquatic therapy, electric stimulation, fluidotherapy, ultrasound, kinesiotaping, orthosis fabrication, wound care  Frequency: 1-2 x Week  Duration: 12 Weeks      Felipe Lee OT

## 2024-10-15 ENCOUNTER — TREATMENT (OUTPATIENT)
Dept: OCCUPATIONAL THERAPY | Facility: HOSPITAL | Age: 79
End: 2024-10-15
Payer: MEDICARE

## 2024-10-15 DIAGNOSIS — S42.411A CLOSED FRACTURE OF SUPRACONDYLAR HUMERUS, RIGHT, INITIAL ENCOUNTER: Primary | ICD-10-CM

## 2024-10-15 PROCEDURE — 97110 THERAPEUTIC EXERCISES: CPT | Mod: GO | Performed by: OCCUPATIONAL THERAPIST

## 2024-10-15 PROCEDURE — 97140 MANUAL THERAPY 1/> REGIONS: CPT | Mod: GO | Performed by: OCCUPATIONAL THERAPIST

## 2024-10-15 NOTE — PROGRESS NOTES
Occupational Therapy  Occupational Therapy Orthopedic Treatment     Patient Name: Gertrudis Cottrell  MRN: [id number]  Today's Date: 10/15/2024    Insurance: MEDICARE A/B / NO AUTH / [place] VISITS - 0 USED OT 8/2/24 / [employer] SUPP ACTIVE / PER RTE   Time:  Time Calculation  Start Time: 0855  Stop Time: 0935  Time Calculation (min): 40 min  OT Therapeutic Procedures Time Entry  Manual Therapy Time Entry: 20  Therapeutic Exercise Time Entry: 20    Insurance Type: Payor: MEDICARE / Plan: MEDICARE PART A AND B / Product Type: *No Product type* /      Insurance:  Visit number: 17  General:  Reason for visit: L elbow  Referred by: Hilda Patton PA-C    Current Problem  1. Closed fracture of supracondylar humerus, right, initial encounter                Insurance Type: Payor: MEDICARE / Plan: MEDICARE PART A AND B / Product Type: *No Product type* /     Precautions: per post op protocol     Medical History Form: Reviewed (scanned into chart)    Subjective:  My fingers and elbow are still stiff.  I was picking up things at the grocery store.       Chief Complaint: L arm/hand  BASILIO: Fall   DOI: 05/12/2024  DOS: 05/30/2024 ORIF L elbow    Hand Dominance: Right    Current Condition since injury:   better      PAIN     Location: L elbow and hand  Intensity: 1/10 up to 3/10  Description: sharp  Aggravating Factors:  functional use  Relieving Factors:  Rest     Relevant Information (PMH & Previous Tests/Imaging): reviewed in chart     Prior Level of Function (PLOF)  Exercise/Physical Activity: jigsaw puzzles  Work/School: retired  Current ADL/IADL Status: independent with homemaking     Patients Living Environment: Reviewed and no concern    Primary Language: English    Pt goals for therapy: improve functional use of L hand for cooking, cleaning, dressing, jigsaw puzzles, and various household chores    Red Flags: Do you have any of the following? No  Fever/chills, unexplained weight changes, dizziness/fainting, unexplained change in  bowel or bladder functions, unexplained malaise or muscle weakness, night pain/sweats, numbness or tingling    Objective:     Distance from tips to palm -; MCP flexion ; achieved 1 cm post treatment for all digits  2nd: 1.5 cm ; 65   - post treatment 70  3rd: 2 cm  ;  65  - post treatment 70  4th: 2.5   ;  70 - post treatment 75  5th: 3 cm  ; 55 - - post treatment 60  Thumb opposition to tip of 4th   Wrist extension/flexion 75 /62   Wrist ulnar/radial deviation 20/10  Forearm pronation/supination 70 was 60/75 was 60  Elbow extension/flexion: 25  -115   ;  post treatment     Physical Observation: significant limitations with elbow and digit range of motion, MCPs most limited, extensor habitus posture of hand    Edema: moderate    Sensory: no complaints of sensory issues     Good rehab potential    Outcome Measures:  Quick DASH: 34.09     EDUCATION: home exercise program, plan of care, activity modifications, pain management, and injury pathology     Goals:  Active       OT Goals       OT Goal 1       Start:  08/02/24    Expected End:  09/13/24       Achieve tips to palm for grasping objects in hand.         OT Goal 2       Start:  08/02/24    Expected End:  09/13/24       Improve elbow flexion to 120 degrees for dressing tasks.         OT Goal 3       Start:  08/02/24    Expected End:  10/11/24       Improve elbow extension to 15 degrees for reaching to grasp objects.         OT Goal 4       Start:  08/02/24    Expected End:  10/11/24       Improve Quick DASH to 15%.           OT Goal 5       Start:  08/02/24    Expected End:  10/11/24       Improve  strength to 70% of unaffected side for opening jars.           Plan of care was developed with input and agreement by the patient.    Treatments:      Therapeutic Exercise:   20 min  HEP education and completion: MCP flexion stretch, IP flexion stretch, composite digit flexion stretch, prayer stretch, wrist flexion stretch, and forearm pronation stretch.  Digits  positioned into end range digit flexion with coban and use of heating pad to improve tissue extensibility and improve range of motion.   Place and hold into end range digit flexion.   Sponge pickup with hand gripper on light resistance.   Wrist extension and flexion with green flexbar.    Forearm pronation and supination with green flexbar.   Patient was shown passive flexion stretch into chair.    Manual Therapy    20 min  Therapist performed manual digit, wrist, and elbow range of motion.  Therapist performed A/P joint mobilization combined with MCP flexion.  Therapist positioned MCPs in end range flexion with patient making active fist.  Therapist performed manual soft tissue mobilization along elbow flexors. Did not complete today  Therapist perform elbow distraction combined with elbow extension. Did not complete today      Assessment:      Continued range of motion and strengthening exercises. Patient to work aggressively on end range digit flexion. Patient to continue increasing functional use of hand as tolerated.      Plan:      Planned Interventions include: therapeutic exercise, therapeutic activity, self-care home management, manual therapy, therapeutic activities, gait training, neuromuscular coordination, vasopneumatic, dry needling, aquatic therapy, electric stimulation, fluidotherapy, ultrasound, kinesiotaping, orthosis fabrication, wound care  Frequency: 1-2 x Week  Duration: 12 Weeks      Felipe Lee, OT

## 2024-10-18 ENCOUNTER — TREATMENT (OUTPATIENT)
Dept: OCCUPATIONAL THERAPY | Facility: HOSPITAL | Age: 79
End: 2024-10-18
Payer: MEDICARE

## 2024-10-18 DIAGNOSIS — S42.411A CLOSED FRACTURE OF SUPRACONDYLAR HUMERUS, RIGHT, INITIAL ENCOUNTER: Primary | ICD-10-CM

## 2024-10-18 PROCEDURE — 97140 MANUAL THERAPY 1/> REGIONS: CPT | Mod: GO | Performed by: OCCUPATIONAL THERAPIST

## 2024-10-18 PROCEDURE — 97110 THERAPEUTIC EXERCISES: CPT | Mod: GO | Performed by: OCCUPATIONAL THERAPIST

## 2024-10-18 NOTE — PROGRESS NOTES
Occupational Therapy  Occupational Therapy Orthopedic Treatment     Patient Name: Gertrudis Cottrell  MRN: [id number]  Today's Date: 10/18/2024    Insurance: MEDICARE A/B / NO AUTH / [place] VISITS - 0 USED OT 8/2/24 / [employer] SUPP ACTIVE / PER RTE   Time:   Time:  Time Calculation  Start Time: 0845  Stop Time: 0925  Time Calculation (min): 40 min  OT Therapeutic Procedures Time Entry  Manual Therapy Time Entry: 20  Therapeutic Exercise Time Entry: 20    Insurance Type: Payor: MEDICARE / Plan: MEDICARE PART A AND B / Product Type: *No Product type* /      Insurance:  Visit number: 18  General:  Reason for visit: L elbow  Referred by: Hilda Patton PA-C    Current Problem  1. Closed fracture of supracondylar humerus, right, initial encounter            Insurance Type: Payor: MEDICARE / Plan: MEDICARE PART A AND B / Product Type: *No Product type* /     Precautions: per post op protocol     Medical History Form: Reviewed (scanned into chart)    Subjective:  I have been having numbness on the side of my hand. My elbow is still stiff.       Chief Complaint: L arm/hand  BASILIO: Fall   DOI: 05/12/2024  DOS: 05/30/2024 ORIF L elbow    Hand Dominance: Right    Current Condition since injury:   better      PAIN     Location: L elbow and hand  Intensity: 1/10 up to 3/10  Description: sharp  Aggravating Factors:  functional use  Relieving Factors:  Rest     Relevant Information (PMH & Previous Tests/Imaging): reviewed in chart     Prior Level of Function (PLOF)  Exercise/Physical Activity: jigsaw puzzles  Work/School: retired  Current ADL/IADL Status: independent with homemaking     Patients Living Environment: Reviewed and no concern    Primary Language: English    Pt goals for therapy: improve functional use of L hand for cooking, cleaning, dressing, jigsaw puzzles, and various household chores    Red Flags: Do you have any of the following? No  Fever/chills, unexplained weight changes, dizziness/fainting, unexplained change in bowel  or bladder functions, unexplained malaise or muscle weakness, night pain/sweats, numbness or tingling    Objective:     Distance from tips to palm -; MCP flexion ; achieved 1 cm post treatment for all digits  2nd: 1.5 cm ; 65   - post treatment 70  3rd: 2 cm  ;  65  - post treatment 70  4th: 2.5   ;  70 - post treatment 75  5th: 3 cm  ; 55 - - post treatment 60  Thumb opposition to tip of 4th   Wrist extension/flexion 75 /62   Wrist ulnar/radial deviation 20/10  Forearm pronation/supination 70 /75    Elbow extension/flexion: 25  -115   ;  post treatment     Physical Observation: significant limitations with elbow and digit range of motion, MCPs most limited, extensor habitus posture of hand    Edema: moderate    Sensory: no complaints of sensory issues     Good rehab potential    Outcome Measures:  Quick DASH: 34.09     EDUCATION: home exercise program, plan of care, activity modifications, pain management, and injury pathology     Goals:  Active       OT Goals       OT Goal 1       Start:  08/02/24    Expected End:  09/13/24       Achieve tips to palm for grasping objects in hand.         OT Goal 2       Start:  08/02/24    Expected End:  09/13/24       Improve elbow flexion to 120 degrees for dressing tasks.         OT Goal 3       Start:  08/02/24    Expected End:  10/11/24       Improve elbow extension to 15 degrees for reaching to grasp objects.         OT Goal 4       Start:  08/02/24    Expected End:  10/11/24       Improve Quick DASH to 15%.           OT Goal 5       Start:  08/02/24    Expected End:  10/11/24       Improve  strength to 70% of unaffected side for opening jars.           Plan of care was developed with input and agreement by the patient.    Treatments:      Therapeutic Exercise:   20 min  HEP education and completion: MCP flexion stretch, IP flexion stretch, composite digit flexion stretch, prayer stretch, wrist flexion stretch, and forearm pronation stretch.  Digits positioned into end  range digit flexion with coban and use of heating pad to improve tissue extensibility and improve range of motion.   Place and hold into end range digit flexion.   Added elbow extension and rows with green thereaband.    Manual Therapy    20 min  Therapist performed manual digit, wrist, and elbow range of motion.  Therapist performed A/P joint mobilization combined with MCP flexion.  Therapist positioned MCPs in end range flexion with patient making active fist.  Therapist performed manual soft tissue mobilization along elbow flexors. Did not complete today  Therapist perform elbow distraction combined with elbow extension. Did not complete today      Assessment:    Progressed home program to include theraband strengthening. ROM unchanged today. Patient continues to report good compliance with mobilization splints.          Plan:      Planned Interventions include: therapeutic exercise, therapeutic activity, self-care home management, manual therapy, therapeutic activities, gait training, neuromuscular coordination, vasopneumatic, dry needling, aquatic therapy, electric stimulation, fluidotherapy, ultrasound, kinesiotaping, orthosis fabrication, wound care  Frequency: 1-2 x Week  Duration: 12 Weeks      Felipe Lee OT

## 2024-11-01 ENCOUNTER — TREATMENT (OUTPATIENT)
Dept: OCCUPATIONAL THERAPY | Facility: HOSPITAL | Age: 79
End: 2024-11-01
Payer: MEDICARE

## 2024-11-01 DIAGNOSIS — S42.411A CLOSED FRACTURE OF SUPRACONDYLAR HUMERUS, RIGHT, INITIAL ENCOUNTER: Primary | ICD-10-CM

## 2024-11-01 PROCEDURE — 97110 THERAPEUTIC EXERCISES: CPT | Mod: GO | Performed by: OCCUPATIONAL THERAPIST

## 2024-11-01 PROCEDURE — 97140 MANUAL THERAPY 1/> REGIONS: CPT | Mod: GO | Performed by: OCCUPATIONAL THERAPIST

## 2024-11-05 ENCOUNTER — TREATMENT (OUTPATIENT)
Dept: OCCUPATIONAL THERAPY | Facility: HOSPITAL | Age: 79
End: 2024-11-05
Payer: MEDICARE

## 2024-11-05 DIAGNOSIS — S42.411A CLOSED FRACTURE OF SUPRACONDYLAR HUMERUS, RIGHT, INITIAL ENCOUNTER: ICD-10-CM

## 2024-11-05 PROCEDURE — 97110 THERAPEUTIC EXERCISES: CPT | Mod: GO | Performed by: OCCUPATIONAL THERAPIST

## 2024-11-05 PROCEDURE — 97140 MANUAL THERAPY 1/> REGIONS: CPT | Mod: GO | Performed by: OCCUPATIONAL THERAPIST

## 2024-11-05 NOTE — PROGRESS NOTES
Occupational Therapy  Occupational Therapy Orthopedic Treatment     Patient Name: Gertrudis Cottrell  MRN: [id number]  Today's Date: 11/5/2024    Insurance: MEDICARE A/B / NO AUTH / [place] VISITS - 0 USED OT 8/2/24 / [employer] SUPP ACTIVE / PER RTE   Time:  Time Calculation  Start Time: 0845  Stop Time: 0930  Time Calculation (min): 45 min  OT Therapeutic Procedures Time Entry  Manual Therapy Time Entry: 25  Therapeutic Exercise Time Entry: 20       Insurance:  Visit number: 20  General:  Reason for visit: L elbow  Referred by: Hilda Patton PA-C    Current Problem  1. Closed fracture of supracondylar humerus, right, initial encounter  Follow Up In Occupational Therapy            Insurance Type: Payor: MEDICARE / Plan: MEDICARE PART A AND B / Product Type: *No Product type* /     Precautions: per post op protocol     Medical History Form: Reviewed (scanned into chart)    Subjective:  The nerve is feeling better.      Chief Complaint: L arm/hand  BASILIO: Fall   DOI: 05/12/2024  DOS: 05/30/2024 ORIF L elbow    Hand Dominance: Right    Current Condition since injury:   better      PAIN     Location: L elbow and hand  Intensity: 1/10 up to 3/10  Description: sharp  Aggravating Factors:  functional use  Relieving Factors:  Rest     Relevant Information (PMH & Previous Tests/Imaging): reviewed in chart     Prior Level of Function (PLOF)  Exercise/Physical Activity: jigsaw puzzles  Work/School: retired  Current ADL/IADL Status: independent with homemaking     Patients Living Environment: Reviewed and no concern    Primary Language: English    Pt goals for therapy: improve functional use of L hand for cooking, cleaning, dressing, jigsaw puzzles, and various household chores    Red Flags: Do you have any of the following? No  Fever/chills, unexplained weight changes, dizziness/fainting, unexplained change in bowel or bladder functions, unexplained malaise or muscle weakness, night pain/sweats, numbness or tingling    Objective:      Distance from tips to palm -; MCP flexion ; achieved 1 cm post treatment for all digits  2nd: 1.5 cm ; 65   - post treatment 70; 1 cm post  3rd: 2 cm  ;  65  - post treatment 70; 2 cm post  4th: 2.5   ;  70 - post treatment 75; 2 cm post  5th: 3 cm  ; 55 - - post treatment ; 2 cm post  Thumb opposition to tip of 4th   Wrist extension/flexion 75 /62   Wrist ulnar/radial deviation 20/10  Forearm pronation/supination 70 /75    Elbow extension/flexion: 25  -115   ;  post treatment     Physical Observation: significant limitations with elbow and digit range of motion, MCPs most limited, extensor habitus posture of hand    Edema: moderate    Sensory: no complaints of sensory issues     Good rehab potential    Outcome Measures:  Quick DASH: 34.09     EDUCATION: home exercise program, plan of care, activity modifications, pain management, and injury pathology     Goals:  Active       OT Goals       OT Goal 1       Start:  08/02/24    Expected End:  09/13/24       Achieve tips to palm for grasping objects in hand.         OT Goal 2       Start:  08/02/24    Expected End:  09/13/24       Improve elbow flexion to 120 degrees for dressing tasks.         OT Goal 3       Start:  08/02/24    Expected End:  10/11/24       Improve elbow extension to 15 degrees for reaching to grasp objects.         OT Goal 4       Start:  08/02/24    Expected End:  10/11/24       Improve Quick DASH to 15%.           OT Goal 5       Start:  08/02/24    Expected End:  10/11/24       Improve  strength to 70% of unaffected side for opening jars.           Plan of care was developed with input and agreement by the patient.    Treatments:      Therapeutic Exercise:   20 min  HEP education and completion: MCP flexion stretch, IP flexion stretch, composite digit flexion stretch, prayer stretch, wrist flexion stretch, and forearm pronation stretch.  Digits positioned into end range digit flexion with coban and use of heating pad to improve tissue  extensibility and improve range of motion.   Place and hold into end range digit flexion.   Elbow extension stretch against door. - added to home program.    Manual Therapy    25 min  Therapist performed manual digit, wrist, and elbow range of motion.  Therapist performed A/P joint mobilization combined with MCP flexion.  Therapist positioned MCPs in end range flexion with patient making active fist.  Therapist perform elbow distraction combined with elbow extension.        Assessment:    Added elbow extension stretch today. Patient improving with use of SF. Patient to continue aggressively working on end range digit motion. Patient reports good compliance with use of mobilization orthoses.      Plan:      Planned Interventions include: therapeutic exercise, therapeutic activity, self-care home management, manual therapy, therapeutic activities, gait training, neuromuscular coordination, vasopneumatic, dry needling, aquatic therapy, electric stimulation, fluidotherapy, ultrasound, kinesiotaping, orthosis fabrication, wound care  Frequency: 1-2 x Week  Duration: 12 Weeks      Felipe Lee OT

## 2024-11-08 ENCOUNTER — TREATMENT (OUTPATIENT)
Dept: OCCUPATIONAL THERAPY | Facility: HOSPITAL | Age: 79
End: 2024-11-08
Payer: MEDICARE

## 2024-11-08 ENCOUNTER — APPOINTMENT (OUTPATIENT)
Dept: PRIMARY CARE | Facility: CLINIC | Age: 79
End: 2024-11-08
Payer: MEDICARE

## 2024-11-08 DIAGNOSIS — S42.411A CLOSED FRACTURE OF SUPRACONDYLAR HUMERUS, RIGHT, INITIAL ENCOUNTER: ICD-10-CM

## 2024-11-08 DIAGNOSIS — Z00.00 HEALTH CARE MAINTENANCE: ICD-10-CM

## 2024-11-08 DIAGNOSIS — E53.8 B12 DEFICIENCY: Primary | ICD-10-CM

## 2024-11-08 PROCEDURE — 97140 MANUAL THERAPY 1/> REGIONS: CPT | Mod: GO | Performed by: OCCUPATIONAL THERAPIST

## 2024-11-08 PROCEDURE — 97110 THERAPEUTIC EXERCISES: CPT | Mod: GO | Performed by: OCCUPATIONAL THERAPIST

## 2024-11-08 RX ORDER — CYANOCOBALAMIN 1000 UG/ML
1000 INJECTION, SOLUTION INTRAMUSCULAR; SUBCUTANEOUS ONCE
Status: SHIPPED | OUTPATIENT
Start: 2024-11-08

## 2024-11-08 NOTE — PROGRESS NOTES
Occupational Therapy  Occupational Therapy Orthopedic Treatment     Patient Name: Gertrudis Cottrell  MRN: [id number]  Today's Date: 11/8/2024    Insurance: MEDICARE A/B / NO AUTH / [place] VISITS - 0 USED OT 8/2/24 / [employer] SUPP ACTIVE / PER RTE   Time:     Time:  Time Calculation  Start Time: 0845  Stop Time: 0930  Time Calculation (min): 45 min  OT Therapeutic Procedures Time Entry  Manual Therapy Time Entry: 25  Therapeutic Exercise Time Entry: 20    Insurance Type: Payor: MEDICARE / Plan: MEDICARE PART A AND B / Product Type: *No Product type* /        Insurance:  Visit number: 21  General:  Reason for visit: L elbow  Referred by: Hilda Patton PA-C    Current Problem  1. Closed fracture of supracondylar humerus, right, initial encounter  Follow Up In Occupational Therapy            Insurance Type: Payor: MEDICARE / Plan: MEDICARE PART A AND B / Product Type: *No Product type* /     Precautions: per post op protocol     Medical History Form: Reviewed (scanned into chart)    Subjective:  I think my elbow is loosening up.       Chief Complaint: L arm/hand  BASILIO: Fall   DOI: 05/12/2024  DOS: 05/30/2024 ORIF L elbow    Hand Dominance: Right    Current Condition since injury:   better      PAIN     Location: L elbow and hand  Intensity: 1/10 up to 3/10  Description: sharp  Aggravating Factors:  functional use  Relieving Factors:  Rest     Relevant Information (PMH & Previous Tests/Imaging): reviewed in chart     Prior Level of Function (PLOF)  Exercise/Physical Activity: jigsaw puzzles  Work/School: retired  Current ADL/IADL Status: independent with homemaking     Patients Living Environment: Reviewed and no concern    Primary Language: English    Pt goals for therapy: improve functional use of L hand for cooking, cleaning, dressing, jigsaw puzzles, and various household chores    Red Flags: Do you have any of the following? No  Fever/chills, unexplained weight changes, dizziness/fainting, unexplained change in bowel or  bladder functions, unexplained malaise or muscle weakness, night pain/sweats, numbness or tingling    Objective:     Distance from tips to palm -; MCP flexion ; achieved 1 cm post treatment for all digits  2nd: 1.5 cm ; 65   - post treatment 70; 1 cm post  3rd: 2 cm  ;  65  - post treatment 70; 2 cm post  4th: 2.5   ;  70 - post treatment 75; 2 cm post  5th: 3 cm  ; 55 - - post treatment ; 2 cm post  Thumb opposition to tip of 4th   Wrist extension/flexion 75 /62   Wrist ulnar/radial deviation 20/10  Forearm pronation/supination 70 /75    Elbow extension/flexion: 25  -115   ;  post treatment     Physical Observation: significant limitations with elbow and digit range of motion, MCPs most limited, extensor habitus posture of hand    Edema: moderate    Sensory: no complaints of sensory issues     Good rehab potential    Outcome Measures:  Quick DASH: 34.09     EDUCATION: home exercise program, plan of care, activity modifications, pain management, and injury pathology     Goals:  Active       OT Goals       OT Goal 1       Start:  08/02/24    Expected End:  09/13/24       Achieve tips to palm for grasping objects in hand.         OT Goal 2       Start:  08/02/24    Expected End:  09/13/24       Improve elbow flexion to 120 degrees for dressing tasks.         OT Goal 3       Start:  08/02/24    Expected End:  10/11/24       Improve elbow extension to 15 degrees for reaching to grasp objects.         OT Goal 4       Start:  08/02/24    Expected End:  10/11/24       Improve Quick DASH to 15%.           OT Goal 5       Start:  08/02/24    Expected End:  10/11/24       Improve  strength to 70% of unaffected side for opening jars.           Plan of care was developed with input and agreement by the patient.    Treatments:      Therapeutic Exercise:   20 min  HEP education and completion: MCP flexion stretch, IP flexion stretch, composite digit flexion stretch, prayer stretch, wrist flexion stretch, and forearm pronation  stretch.  Digits positioned into end range digit flexion with coban and use of heating pad to improve tissue extensibility and improve range of motion.   Place and hold into end range digit flexion.   Elbow extension stretch against door.      Manual Therapy    25 min  Therapist performed manual digit, wrist, and elbow range of motion.  Therapist performed A/P joint mobilization combined with MCP flexion.  Therapist positioned MCPs in end range flexion with patient making active fist.  Therapist perform elbow distraction combined with elbow extension.        Assessment:    Continued range of motion exercises. Reviewed elbow extension stretch against door with patient today for correct form. Patient continues to report good compliance with home program.       Plan:      Planned Interventions include: therapeutic exercise, therapeutic activity, self-care home management, manual therapy, therapeutic activities, gait training, neuromuscular coordination, vasopneumatic, dry needling, aquatic therapy, electric stimulation, fluidotherapy, ultrasound, kinesiotaping, orthosis fabrication, wound care  Frequency: 1-2 x Week  Duration: 12 Weeks      Felipe Lee OT

## 2024-11-12 ENCOUNTER — TREATMENT (OUTPATIENT)
Dept: OCCUPATIONAL THERAPY | Facility: HOSPITAL | Age: 79
End: 2024-11-12
Payer: MEDICARE

## 2024-11-12 DIAGNOSIS — S42.411A CLOSED FRACTURE OF SUPRACONDYLAR HUMERUS, RIGHT, INITIAL ENCOUNTER: ICD-10-CM

## 2024-11-12 PROCEDURE — 97140 MANUAL THERAPY 1/> REGIONS: CPT | Mod: GO | Performed by: OCCUPATIONAL THERAPIST

## 2024-11-12 PROCEDURE — 97110 THERAPEUTIC EXERCISES: CPT | Mod: GO | Performed by: OCCUPATIONAL THERAPIST

## 2024-11-12 NOTE — PROGRESS NOTES
Occupational Therapy  Occupational Therapy Orthopedic Treatment     Patient Name: Gertrudis Cottrell  MRN: [id number]  Today's Date: 11/12/2024    Insurance: MEDICARE A/B / NO AUTH / [place] VISITS - 0 USED OT 8/2/24 / [employer] SUPP ACTIVE / PER RTE   Time:  Time Calculation  Start Time: 0840  Stop Time: 0925  Time Calculation (min): 45 min  OT Therapeutic Procedures Time Entry  Manual Therapy Time Entry: 25  Therapeutic Exercise Time Entry: 20      Insurance Type: Payor: MEDICARE / Plan: MEDICARE PART A AND B / Product Type: *No Product type* /        Insurance:  Visit number: 22  General:  Reason for visit: L elbow  Referred by: Hilda Patton PA-C    Current Problem  1. Closed fracture of supracondylar humerus, right, initial encounter  Follow Up In Occupational Therapy            Insurance Type: Payor: MEDICARE / Plan: MEDICARE PART A AND B / Product Type: *No Product type* /     Precautions: per post op protocol     Medical History Form: Reviewed (scanned into chart)    Subjective:  When I have been stretching against the wall my nerve has been getting irritated.      Chief Complaint: L arm/hand  BASILIO: Fall   DOI: 05/12/2024  DOS: 05/30/2024 ORIF L elbow    Hand Dominance: Right    Current Condition since injury:   better      PAIN     Location: L elbow and hand  Intensity: 1/10 up to 3/10  Description: sharp  Aggravating Factors:  functional use  Relieving Factors:  Rest     Relevant Information (PMH & Previous Tests/Imaging): reviewed in chart     Prior Level of Function (PLOF)  Exercise/Physical Activity: jigsaw puzzles  Work/School: retired  Current ADL/IADL Status: independent with homemaking     Patients Living Environment: Reviewed and no concern    Primary Language: English    Pt goals for therapy: improve functional use of L hand for cooking, cleaning, dressing, jigsaw puzzles, and various household chores    Red Flags: Do you have any of the following? No  Fever/chills, unexplained weight changes,  dizziness/fainting, unexplained change in bowel or bladder functions, unexplained malaise or muscle weakness, night pain/sweats, numbness or tingling    Objective:     Distance from tips to palm -; MCP flexion ; achieved 1 cm post treatment for all digits  2nd: 1.5 cm ; 65   - post treatment 70; 1 cm post  3rd: 2 cm  ;  65  - post treatment 70; 2 cm post  4th: 2.5   ;  70 - post treatment 75; 2 cm post  5th: 3 cm  ; 55 - - post treatment ; 2 cm post  Thumb opposition to tip of 4th   Wrist extension/flexion 75 /62   Wrist ulnar/radial deviation 20/10  Forearm pronation/supination 70 /75    Elbow extension/flexion: 25  -115   ;  post treatment     Physical Observation: significant limitations with elbow and digit range of motion, MCPs most limited, extensor habitus posture of hand    Edema: moderate    Sensory: no complaints of sensory issues     Good rehab potential    Outcome Measures:  Quick DASH: 34.09     EDUCATION: home exercise program, plan of care, activity modifications, pain management, and injury pathology     Goals:  Active       OT Goals       OT Goal 1       Start:  08/02/24    Expected End:  09/13/24       Achieve tips to palm for grasping objects in hand.         OT Goal 2       Start:  08/02/24    Expected End:  09/13/24       Improve elbow flexion to 120 degrees for dressing tasks.         OT Goal 3       Start:  08/02/24    Expected End:  10/11/24       Improve elbow extension to 15 degrees for reaching to grasp objects.         OT Goal 4       Start:  08/02/24    Expected End:  10/11/24       Improve Quick DASH to 15%.           OT Goal 5       Start:  08/02/24    Expected End:  10/11/24       Improve  strength to 70% of unaffected side for opening jars.           Plan of care was developed with input and agreement by the patient.    Treatments:      Therapeutic Exercise:   20 min  HEP education and completion: MCP flexion stretch, IP flexion stretch, composite digit flexion stretch, prayer  stretch, wrist flexion stretch, and forearm pronation stretch.  Digits positioned into end range digit flexion with coban and use of heating pad to improve tissue extensibility and improve range of motion.   Place and hold into end range digit flexion.   Sponge pickup with hand gripper on light resistance.     Manual Therapy    25 min  Therapist performed manual digit, wrist, and elbow range of motion.  Therapist performed A/P joint mobilization combined with MCP flexion.  Therapist positioned MCPs in end range flexion with patient making active fist.  Therapist perform elbow distraction combined with elbow extension.        Assessment:     Continued working on end range digit and elbow range of motion. Patient to continue working on end range motion. Patient to continue use of mobilization splints. Patient to bring in MCP flexion orthosis next visit for adjustment.       Plan:      Planned Interventions include: therapeutic exercise, therapeutic activity, self-care home management, manual therapy, therapeutic activities, gait training, neuromuscular coordination, vasopneumatic, dry needling, aquatic therapy, electric stimulation, fluidotherapy, ultrasound, kinesiotaping, orthosis fabrication, wound care  Frequency: 1-2 x Week  Duration: 12 Weeks      Felipe Lee OT

## 2024-11-15 ENCOUNTER — TREATMENT (OUTPATIENT)
Dept: OCCUPATIONAL THERAPY | Facility: HOSPITAL | Age: 79
End: 2024-11-15
Payer: MEDICARE

## 2024-11-15 DIAGNOSIS — S42.411A CLOSED FRACTURE OF SUPRACONDYLAR HUMERUS, RIGHT, INITIAL ENCOUNTER: ICD-10-CM

## 2024-11-15 PROCEDURE — 97140 MANUAL THERAPY 1/> REGIONS: CPT | Mod: GO | Performed by: OCCUPATIONAL THERAPIST

## 2024-11-15 PROCEDURE — 97110 THERAPEUTIC EXERCISES: CPT | Mod: GO | Performed by: OCCUPATIONAL THERAPIST

## 2024-11-15 NOTE — PROGRESS NOTES
Occupational Therapy  Occupational Therapy Orthopedic Progress    Patient Name: Gertrudis Cottrell  MRN: [id number]  Today's Date: 11/15/2024    Insurance: MEDICARE A/B / NO AUTH / [place] VISITS - 0 USED OT 8/2/24 / [employer] SUPP ACTIVE / PER RTE   Time:  Time Calculation  Start Time: 0840  Stop Time: 0925  Time Calculation (min): 45 min  OT Therapeutic Procedures Time Entry  Manual Therapy Time Entry: 25  Therapeutic Exercise Time Entry: 20      Insurance:  Visit number: 23  General:  Reason for visit: L elbow  Referred by: Hilda Patton PA-C    Current Problem  1. Closed fracture of supracondylar humerus, right, initial encounter  Follow Up In Occupational Therapy        Insurance Type: Payor: MEDICARE / Plan: MEDICARE PART A AND B / Product Type: *No Product type* /     Precautions: per post op protocol     Medical History Form: Reviewed (scanned into chart)    Subjective: The elbow has that funky feeling and it seems looser.     Chief Complaint: L arm/hand  BASILIO: Fall   DOI: 05/12/2024  DOS: 05/30/2024 ORIF L elbow    Hand Dominance: Right    Current Condition since injury:   better      PAIN     Location: L elbow and hand  Intensity: 1/10   Description: sharp  Aggravating Factors:  functional use  Relieving Factors:  Rest     Relevant Information (PMH & Previous Tests/Imaging): reviewed in chart     Prior Level of Function (PLOF)  Exercise/Physical Activity: jigsaw puzzles  Work/School: retired  Current ADL/IADL Status: independent with homemaking     Patients Living Environment: Reviewed and no concern    Primary Language: English    Pt goals for therapy: improve functional use of L hand for cooking, cleaning, dressing, jigsaw puzzles, and various household chores    Red Flags: Do you have any of the following? No  Fever/chills, unexplained weight changes, dizziness/fainting, unexplained change in bowel or bladder functions, unexplained malaise or muscle weakness, night pain/sweats, numbness or  tingling    Objective:     Distance from tips to palm -; MCP flexion ; achieved 1 cm post treatment for all digits  2nd: 1.5 cm ; 65   - post treatment 70; 1 cm post  3rd: 2 cm  ;  65  - post treatment 70; 2 cm post  4th: 2.5   ;  70 - post treatment 75; 2 cm post  5th: 3 cm  ; 55 - - post treatment ; 2 cm post  Thumb opposition to tip of 4th   Wrist extension/flexion 75 /62   Wrist ulnar/radial deviation 20/10  Forearm pronation/supination 70 /75    Elbow extension/flexion: 25  -115   ;  post treatment    strength: 50#/5#  Physical Observation: significant limitations with elbow and digit range of motion, MCPs most limited, extensor habitus posture of hand    Edema: moderate    Sensory: no complaints of sensory issues     Good rehab potential    Outcome Measures:  Quick DASH: 35    EDUCATION: home exercise program, plan of care, activity modifications, pain management, and injury pathology     Goals:  Active       OT Goals       OT Goal 1 (Progressing)       Start:  08/02/24    Expected End:  12/06/24       Achieve tips to palm for grasping objects in hand.         OT Goal 2 (Progressing)       Start:  08/02/24    Expected End:  12/06/24       Improve elbow flexion to 120 degrees for dressing tasks.         OT Goal 3 (Progressing)       Start:  08/02/24    Expected End:  12/27/24       Improve elbow extension to 15 degrees for reaching to grasp objects.         OT Goal 4 (Progressing)       Start:  08/02/24    Expected End:  12/27/24       Improve Quick DASH to 15%.           OT Goal 5 (Progressing)       Start:  08/02/24    Expected End:  12/27/24       Improve  strength to 70% of unaffected side for opening jars.           Plan of care was developed with input and agreement by the patient.    Treatments:      Therapeutic Exercise:   20 min  HEP education and completion: MCP flexion stretch, IP flexion stretch, composite digit flexion stretch, prayer stretch, wrist flexion stretch, and forearm pronation  stretch.  Digits positioned into end range digit flexion with coban and use of heating pad to improve tissue extensibility and improve range of motion.   Wrist extension and flexion with red flexbar.  Forearm pronation and supination with red flexbar.  Place and hold into end range digit flexion.   Sponge pickup with hand gripper on light resistance.     Manual Therapy    25 min  Therapist performed manual digit, wrist, and elbow range of motion.  Therapist performed A/P joint mobilization combined with MCP flexion.  Therapist positioned MCPs in end range flexion with patient making active fist.  Therapist perform elbow distraction combined with elbow extension.        Assessment:    Continued end range stretches today for digits and elbow. Digit range of motion improved post treatment session. Updated goals. Patient to benefit from continued therapy services to progress range of motion and functional strengthening.    Plan:      Planned Interventions include: therapeutic exercise, therapeutic activity, self-care home management, manual therapy, therapeutic activities, gait training, neuromuscular coordination, vasopneumatic, dry needling, aquatic therapy, electric stimulation, fluidotherapy, ultrasound, kinesiotaping, orthosis fabrication, wound care  Frequency: 1-2 x Week  Duration: 12 Weeks      Felipe Lee, OT

## 2024-11-19 ENCOUNTER — TREATMENT (OUTPATIENT)
Dept: OCCUPATIONAL THERAPY | Facility: HOSPITAL | Age: 79
End: 2024-11-19
Payer: MEDICARE

## 2024-11-19 DIAGNOSIS — S42.411A CLOSED FRACTURE OF SUPRACONDYLAR HUMERUS, RIGHT, INITIAL ENCOUNTER: ICD-10-CM

## 2024-11-19 PROCEDURE — 97110 THERAPEUTIC EXERCISES: CPT | Mod: GO | Performed by: OCCUPATIONAL THERAPIST

## 2024-11-19 PROCEDURE — 97140 MANUAL THERAPY 1/> REGIONS: CPT | Mod: GO | Performed by: OCCUPATIONAL THERAPIST

## 2024-11-19 NOTE — PROGRESS NOTES
Patient ID: Gertrudis Cottrell is a 79 y.o. female.    ProceduresSubjective   Patient ID: Gertrudis Cottrell is a 79 y.o. female who presents for No chief complaint on file..    HPI     Review of Systems    Objective   There were no vitals taken for this visit.    Physical Exam    Assessment/Plan

## 2024-11-19 NOTE — PROGRESS NOTES
Occupational Therapy  Occupational Therapy Orthopedic Treatment    Patient Name: Gertrudis Cottrell  MRN: [id number]  Today's Date: 11/19/2024    Insurance: MEDICARE A/B / NO AUTH / [place] VISITS - 0 USED OT 8/2/24 / [employer] SUPP ACTIVE / PER RTE   Time:   Time:  Time Calculation  Start Time: 0845  Stop Time: 0930  Time Calculation (min): 45 min  OT Therapeutic Procedures Time Entry  Manual Therapy Time Entry: 25  Therapeutic Exercise Time Entry: 20     Insurance Type: Payor: MEDICARE / Plan: MEDICARE PART A AND B / Product Type: *No Product type* /       Insurance:  Visit number: 24  General:  Reason for visit: L elbow  Referred by: Hilda Patton PA-C    Current Problem  1. Closed fracture of supracondylar humerus, right, initial encounter  Follow Up In Occupational Therapy        Insurance Type: Payor: MEDICARE / Plan: MEDICARE PART A AND B / Product Type: *No Product type* /     Precautions: per post op protocol     Medical History Form: Reviewed (scanned into chart)    Subjective:  I have been working on my fingers and wearing the elbow splint.     Chief Complaint: L arm/hand  BASILIO: Fall   DOI: 05/12/2024  DOS: 05/30/2024 ORIF L elbow    Hand Dominance: Right    Current Condition since injury:   better      PAIN     Location: L elbow and hand  Intensity: 1/10   Description: sharp  Aggravating Factors:  functional use  Relieving Factors:  Rest     Relevant Information (PMH & Previous Tests/Imaging): reviewed in chart     Prior Level of Function (PLOF)  Exercise/Physical Activity: jigsaw puzzles  Work/School: retired  Current ADL/IADL Status: independent with homemaking     Patients Living Environment: Reviewed and no concern    Primary Language: English    Pt goals for therapy: improve functional use of L hand for cooking, cleaning, dressing, jigsaw puzzles, and various household chores    Red Flags: Do you have any of the following? No  Fever/chills, unexplained weight changes, dizziness/fainting, unexplained change in  bowel or bladder functions, unexplained malaise or muscle weakness, night pain/sweats, numbness or tingling    Objective:     Distance from tips to palm -; MCP flexion ; achieved 1 cm post treatment for all digits  2nd: 1.5 cm ; 65   - post treatment 70; 1 cm post  3rd: 2 cm  ;  65  - post treatment 70; 2 cm post  4th: 2.5   ;  70 - post treatment 75; 2 cm post  5th: 3 cm  ; 55 - - post treatment ; 2 cm post  Thumb opposition to tip of 4th   Wrist extension/flexion 75 /62   Wrist ulnar/radial deviation 20/10  Forearm pronation/supination 70 /75    Elbow extension/flexion: 23 was 25  -115   ;  post treatment    strength: 50#/5#  Physical Observation: significant limitations with elbow and digit range of motion, MCPs most limited, extensor habitus posture of hand    Edema: moderate    Sensory: no complaints of sensory issues     Good rehab potential    Outcome Measures:  Quick DASH: 35    EDUCATION: home exercise program, plan of care, activity modifications, pain management, and injury pathology     Goals:  Active       OT Goals       OT Goal 1 (Progressing)       Start:  08/02/24    Expected End:  12/06/24       Achieve tips to palm for grasping objects in hand.         OT Goal 2 (Progressing)       Start:  08/02/24    Expected End:  12/06/24       Improve elbow flexion to 120 degrees for dressing tasks.         OT Goal 3 (Progressing)       Start:  08/02/24    Expected End:  12/27/24       Improve elbow extension to 15 degrees for reaching to grasp objects.         OT Goal 4 (Progressing)       Start:  08/02/24    Expected End:  12/27/24       Improve Quick DASH to 15%.           OT Goal 5 (Progressing)       Start:  08/02/24    Expected End:  12/27/24       Improve  strength to 70% of unaffected side for opening jars.           Plan of care was developed with input and agreement by the patient.    Treatments:      Therapeutic Exercise:   20 min  HEP education and completion: MCP flexion stretch, IP flexion  stretch, composite digit flexion stretch, prayer stretch, wrist flexion stretch, and forearm pronation stretch.  Digits positioned into end range digit flexion with coban and use of heating pad to improve tissue extensibility and improve range of motion.   Reviewed composite flexion stretch into table and elbow extension stretch against wall.   Wrist extension and flexion with red flexbar.  Forearm pronation and supination with red flexbar.  Place and hold into end range digit flexion.   Sponge pickup with hand gripper on light resistance. Held today    Manual Therapy    25 min  Therapist performed manual digit, wrist, and elbow range of motion.  Therapist performed A/P joint mobilization combined with MCP flexion.  Therapist positioned MCPs in end range flexion with patient making active fist.  Therapist perform elbow distraction combined with elbow extension.      Assessment:     Continued range of motion exercises today.  strength unchanged. Patient continues to be motivated and compliant with home program and use of mobilization splints.     Plan:      Planned Interventions include: therapeutic exercise, therapeutic activity, self-care home management, manual therapy, therapeutic activities, gait training, neuromuscular coordination, vasopneumatic, dry needling, aquatic therapy, electric stimulation, fluidotherapy, ultrasound, kinesiotaping, orthosis fabrication, wound care  Frequency: 1-2 x Week  Duration: 12 Weeks      Felipe Lee, OT

## 2024-11-22 ENCOUNTER — TREATMENT (OUTPATIENT)
Dept: OCCUPATIONAL THERAPY | Facility: HOSPITAL | Age: 79
End: 2024-11-22
Payer: MEDICARE

## 2024-11-22 DIAGNOSIS — S42.411A CLOSED FRACTURE OF SUPRACONDYLAR HUMERUS, RIGHT, INITIAL ENCOUNTER: ICD-10-CM

## 2024-11-22 PROCEDURE — 97110 THERAPEUTIC EXERCISES: CPT | Mod: GO | Performed by: OCCUPATIONAL THERAPIST

## 2024-11-22 PROCEDURE — 97140 MANUAL THERAPY 1/> REGIONS: CPT | Mod: GO | Performed by: OCCUPATIONAL THERAPIST

## 2024-11-22 NOTE — PROGRESS NOTES
Occupational Therapy  Occupational Therapy Orthopedic Treatment    Patient Name: Gertrudis Cottrell  MRN: [id number]  Today's Date: 11/22/2024    Insurance: MEDICARE A/B / NO AUTH / [place] VISITS - 0 USED OT 8/2/24 / [employer] SUPP ACTIVE / PER RTE   Time:   Time:   Time:  Time Calculation  Start Time: 0815  Stop Time: 0930  Time Calculation (min): 75 min  OT Therapeutic Procedures Time Entry  Manual Therapy Time Entry: 25  Therapeutic Exercise Time Entry: 20       Insurance Type: Payor: MEDICARE / Plan: MEDICARE PART A AND B / Product Type: *No Product type* /       Insurance:  Visit number: 25  General:  Reason for visit: L elbow  Referred by: Hilda Patton PA-C    Current Problem  1. Closed fracture of supracondylar humerus, right, initial encounter  Follow Up In Occupational Therapy        Insurance Type: Payor: MEDICARE / Plan: MEDICARE PART A AND B / Product Type: *No Product type* /     Precautions: per post op protocol     Medical History Form: Reviewed (scanned into chart)    Subjective:  I still have trouble with the oven door.     Chief Complaint: L arm/hand  BASILIO: Fall   DOI: 05/12/2024  DOS: 05/30/2024 ORIF L elbow    Hand Dominance: Right    Current Condition since injury:   better      PAIN     Location: L elbow and hand  Intensity: 1/10  up to 6/10  Description: sharp  Aggravating Factors:  functional use  Relieving Factors:  Rest     Relevant Information (PMH & Previous Tests/Imaging): reviewed in chart     Prior Level of Function (PLOF)  Exercise/Physical Activity: jigsaw puzzles  Work/School: retired  Current ADL/IADL Status: independent with homemaking     Patients Living Environment: Reviewed and no concern    Primary Language: English    Pt goals for therapy: improve functional use of L hand for cooking, cleaning, dressing, jigsaw puzzles, and various household chores    Red Flags: Do you have any of the following? No  Fever/chills, unexplained weight changes, dizziness/fainting, unexplained change in  bowel or bladder functions, unexplained malaise or muscle weakness, night pain/sweats, numbness or tingling    Objective:     Distance from tips to palm -; MCP flexion ; achieved 1 cm post treatment for all digits  2nd: 1 cm was 1.5 cm ; 65   - post treatment 70; 1 cm post  3rd: 1.5 cm was 2 cm  ;  65  - post treatment 70; 2 cm post  4th: 2 cm was 2.5   ;  70 - post treatment 75; 2 cm post  5th: 3 cm  ; 55 - - post treatment ; 2 cm post  Thumb opposition to tip of 4th   Wrist extension/flexion 75 /62   Wrist ulnar/radial deviation 20/10  Forearm pronation/supination 70 /75    Elbow extension/flexion: 23 -115   ;  post treatment    strength: 50#/8# was 5#  Physical Observation: significant limitations with elbow and digit range of motion, MCPs most limited, extensor habitus posture of hand    Edema: moderate    Sensory: no complaints of sensory issues     Good rehab potential    Outcome Measures:  Quick DASH: 35    EDUCATION: home exercise program, plan of care, activity modifications, pain management, and injury pathology     Goals:  Active       OT Goals       OT Goal 1 (Progressing)       Start:  08/02/24    Expected End:  12/06/24       Achieve tips to palm for grasping objects in hand.         OT Goal 2 (Progressing)       Start:  08/02/24    Expected End:  12/06/24       Improve elbow flexion to 120 degrees for dressing tasks.         OT Goal 3 (Progressing)       Start:  08/02/24    Expected End:  12/27/24       Improve elbow extension to 15 degrees for reaching to grasp objects.         OT Goal 4 (Progressing)       Start:  08/02/24    Expected End:  12/27/24       Improve Quick DASH to 15%.           OT Goal 5 (Progressing)       Start:  08/02/24    Expected End:  12/27/24       Improve  strength to 70% of unaffected side for opening jars.           Plan of care was developed with input and agreement by the patient.    Treatments:      Therapeutic Exercise:   20 min  HEP education and completion: MCP  flexion stretch, IP flexion stretch, composite digit flexion stretch, prayer stretch, wrist flexion stretch, and forearm pronation stretch.  Digits positioned into end range digit flexion with coban and use of heating pad to improve tissue extensibility and improve range of motion.   Reviewed composite flexion stretch into table and elbow extension stretch against wall.   Wrist extension and flexion with red flexbar.  Forearm pronation with red and supination with yellow flexbar.  Place and hold into end range digit flexion.   Sponge pickup with hand gripper on light resistance   Putty tools for functional strengthening. ,  Grasping with power web and extension stretch.    Manual Therapy    25 min  Therapist performed manual digit, wrist, and elbow range of motion.  Therapist performed A/P joint mobilization combined with MCP flexion.  Therapist positioned MCPs in end range flexion with patient making active fist.  Therapist perform elbow distraction combined with elbow extension.      Assessment:     strength improved. Digit range of motion improved for digits 2-4. Patient continues to report good compliance with home program. Patient to follow up next week.     Plan:      Planned Interventions include: therapeutic exercise, therapeutic activity, self-care home management, manual therapy, therapeutic activities, gait training, neuromuscular coordination, vasopneumatic, dry needling, aquatic therapy, electric stimulation, fluidotherapy, ultrasound, kinesiotaping, orthosis fabrication, wound care  Frequency: 1-2 x Week  Duration: 12 Weeks      Felipe Lee OT

## 2024-11-26 ENCOUNTER — TREATMENT (OUTPATIENT)
Dept: OCCUPATIONAL THERAPY | Facility: HOSPITAL | Age: 79
End: 2024-11-26
Payer: MEDICARE

## 2024-11-26 DIAGNOSIS — S42.411A CLOSED FRACTURE OF SUPRACONDYLAR HUMERUS, RIGHT, INITIAL ENCOUNTER: ICD-10-CM

## 2024-11-26 PROCEDURE — 97110 THERAPEUTIC EXERCISES: CPT | Mod: GO,KX | Performed by: OCCUPATIONAL THERAPIST

## 2024-11-26 PROCEDURE — 97140 MANUAL THERAPY 1/> REGIONS: CPT | Mod: GO,KX | Performed by: OCCUPATIONAL THERAPIST

## 2024-11-26 NOTE — PROGRESS NOTES
Occupational Therapy  Occupational Therapy Orthopedic Treatment    Patient Name: Gertrudis Cottrell  MRN: [id number]  Today's Date: 11/26/2024    Insurance: MEDICARE A/B / NO AUTH / [place] VISITS - 0 USED OT 8/2/24 / [employer] SUPP ACTIVE / PER RTE      Time:  Time Calculation  Start Time: 1020  Stop Time: 1100  Time Calculation (min): 40 min  OT Therapeutic Procedures Time Entry  Manual Therapy Time Entry: 25  Therapeutic Exercise Time Entry: 15         Insurance Type: Payor: MEDICARE / Plan: MEDICARE PART A AND B / Product Type: *No Product type* /       Insurance:  Visit number: 27  General:  Reason for visit: L elbow  Referred by: Hilda Patton PA-C    Current Problem  1. Closed fracture of supracondylar humerus, right, initial encounter  Follow Up In Occupational Therapy        Insurance Type: Payor: MEDICARE / Plan: MEDICARE PART A AND B / Product Type: *No Product type* /     Precautions: per post op protocol     Medical History Form: Reviewed (scanned into chart)    Subjective:  I am able to hold onto more things like the door handle, wheel, and a small water bottle.    Chief Complaint: L arm/hand  BASILIO: Fall   DOI: 05/12/2024  DOS: 05/30/2024 ORIF L elbow    Hand Dominance: Right    Current Condition since injury:   better      PAIN     Location: L elbow and hand  Intensity: 1/10  up to 6/10  Description: sharp  Aggravating Factors:  functional use  Relieving Factors:  Rest     Relevant Information (PMH & Previous Tests/Imaging): reviewed in chart     Prior Level of Function (PLOF)  Exercise/Physical Activity: jigsaw puzzles  Work/School: retired  Current ADL/IADL Status: independent with homemaking     Patients Living Environment: Reviewed and no concern    Primary Language: English    Pt goals for therapy: improve functional use of L hand for cooking, cleaning, dressing, jigsaw puzzles, and various household chores    Red Flags: Do you have any of the following? No  Fever/chills, unexplained weight changes,  dizziness/fainting, unexplained change in bowel or bladder functions, unexplained malaise or muscle weakness, night pain/sweats, numbness or tingling    Objective:     Distance from tips to palm -; MCP/PIP/DIP flexion ; achieved 1 cm post treatment for all digits  2nd: 1 cm  ; 65/100/75   - post treatment 70; 0 cm was 1 cm post  3rd: 1.5 cm   ;  65/100/70  - post treatment 70; 1.5 cm was 2 cm post  4th: 2 cm   ;  70/105/40 - post treatment 75; 1.5 cm was 2 cm post  5th: 3 cm  ; 55/95/40 - - post treatment ; 1.5 cm was 2 cm post  Thumb opposition to tip of 4th   Wrist extension/flexion 75 /62   Wrist ulnar/radial deviation 20/10  Forearm pronation/supination 70 /75    Elbow extension/flexion: 23 -115   ;  post treatment    strength: 50#/8#   Physical Observation: significant limitations with elbow and digit range of motion, MCPs most limited, extensor habitus posture of hand    Edema: moderate    Sensory: no complaints of sensory issues     Good rehab potential    Outcome Measures:  Quick DASH: 35    EDUCATION: home exercise program, plan of care, activity modifications, pain management, and injury pathology     Goals:  Active       OT Goals       OT Goal 1 (Progressing)       Start:  08/02/24    Expected End:  12/06/24       Achieve tips to palm for grasping objects in hand.         OT Goal 2 (Progressing)       Start:  08/02/24    Expected End:  12/06/24       Improve elbow flexion to 120 degrees for dressing tasks.         OT Goal 3 (Progressing)       Start:  08/02/24    Expected End:  12/27/24       Improve elbow extension to 15 degrees for reaching to grasp objects.         OT Goal 4 (Progressing)       Start:  08/02/24    Expected End:  12/27/24       Improve Quick DASH to 15%.           OT Goal 5 (Progressing)       Start:  08/02/24    Expected End:  12/27/24       Improve  strength to 70% of unaffected side for opening jars.           Plan of care was developed with input and agreement by the  patient.    Treatments:      Therapeutic Exercise:   15 min  HEP education and completion: MCP flexion stretch, IP flexion stretch, composite digit flexion stretch, prayer stretch, wrist flexion stretch, and forearm pronation stretch.  Digits positioned into end range digit flexion with coban and use of heating pad to improve tissue extensibility and improve range of motion.   Reviewed composite flexion stretch into table and elbow extension stretch against wall.   Wrist extension and flexion with red flexbar. Did not complete today  Forearm pronation with red and supination with yellow flexbar. Did not complete today  Place and hold into end range digit flexion.   Sponge pickup with hand gripper on light resistance   Putty tools for functional strengthening. ,  Grasping with power web and extension stretch.    Manual Therapy    25 min  Therapist performed manual digit, wrist, and elbow range of motion.  Therapist performed A/P joint mobilization combined with MCP flexion.  Therapist positioned MCPs in end range flexion with patient making active fist.  Therapist perform elbow distraction combined with elbow extension.      Assessment:    End range digit motion improved. Patient to bring in static progressive orthosis next visit for adjustment. Patient continues to report good compliance with home program. Patient to continue working on in end range elbow and digit motion.       Plan:      Planned Interventions include: therapeutic exercise, therapeutic activity, self-care home management, manual therapy, therapeutic activities, gait training, neuromuscular coordination, vasopneumatic, dry needling, aquatic therapy, electric stimulation, fluidotherapy, ultrasound, kinesiotaping, orthosis fabrication, wound care  Frequency: 1-2 x Week  Duration: 12 Weeks      Felipe Lee OT   images from [facility]   Pending the above, may need DSE if results equivocal  Given his medical co-morbidities and debility, despite his relatively young chronological age, would likely recommend TAVR over SAVR  Will discuss in heart team conference    Marnie Watson DO  Cardiothoracic Surgery

## 2024-12-03 ENCOUNTER — TREATMENT (OUTPATIENT)
Dept: OCCUPATIONAL THERAPY | Facility: HOSPITAL | Age: 79
End: 2024-12-03
Payer: MEDICARE

## 2024-12-03 DIAGNOSIS — S42.411A CLOSED FRACTURE OF SUPRACONDYLAR HUMERUS, RIGHT, INITIAL ENCOUNTER: ICD-10-CM

## 2024-12-03 PROCEDURE — 97110 THERAPEUTIC EXERCISES: CPT | Mod: GO | Performed by: OCCUPATIONAL THERAPIST

## 2024-12-03 PROCEDURE — 97140 MANUAL THERAPY 1/> REGIONS: CPT | Mod: GO | Performed by: OCCUPATIONAL THERAPIST

## 2024-12-03 NOTE — PROGRESS NOTES
Occupational Therapy  Occupational Therapy Orthopedic Treatment    Patient Name: Gertrudis Cottrell  MRN: [id number]  Today's Date: 12/3/2024    Insurance: MEDICARE A/B / NO AUTH / [place] VISITS - 0 USED OT 8/2/24 / [employer] SUPP ACTIVE / PER RTE   Time:  Time Calculation  Start Time: 0845  Stop Time: 0930  Time Calculation (min): 45 min  OT Therapeutic Procedures Time Entry  Manual Therapy Time Entry: 25  Therapeutic Exercise Time Entry: 20       Insurance Type: Payor: MEDICARE / Plan: MEDICARE PART A AND B / Product Type: *No Product type* /       Insurance:  Visit number: 28  General:  Reason for visit: L elbow  Referred by: Hilda Patton PA-C    Current Problem  1. Closed fracture of supracondylar humerus, right, initial encounter  Follow Up In Occupational Therapy        Insurance Type: Payor: MEDICARE / Plan: MEDICARE PART A AND B / Product Type: *No Product type* /     Precautions: per post op protocol     Medical History Form: Reviewed (scanned into chart)    Subjective:  I am able to hold onto more things like the door handle, wheel, and a small water bottle.    Chief Complaint: L arm/hand  BASILIO: Fall   DOI: 05/12/2024  DOS: 05/30/2024 ORIF L elbow    Hand Dominance: Right    Current Condition since injury:   better      PAIN     Location: L elbow and hand  Intensity: 1/10  up to 6/10  Description: sharp  Aggravating Factors:  functional use  Relieving Factors:  Rest     Relevant Information (PMH & Previous Tests/Imaging): reviewed in chart     Prior Level of Function (PLOF)  Exercise/Physical Activity: jigsaw puzzles  Work/School: retired  Current ADL/IADL Status: independent with homemaking     Patients Living Environment: Reviewed and no concern    Primary Language: English    Pt goals for therapy: improve functional use of L hand for cooking, cleaning, dressing, jigsaw puzzles, and various household chores    Red Flags: Do you have any of the following? No  Fever/chills, unexplained weight changes,  dizziness/fainting, unexplained change in bowel or bladder functions, unexplained malaise or muscle weakness, night pain/sweats, numbness or tingling    Objective:     Distance from tips to palm -; MCP/PIP/DIP flexion ; achieved 1 cm post treatment for all digits  2nd: 1 cm  ; 65/100/75   - post treatment 70; 0 cm    3rd: 1.5 cm   ;  65/100/70  - post treatment 70; 1.5 cm    4th: 2 cm   ;  70/105/40 - post treatment 75; 1.5 cm    5th: 3 cm  ; 55/95/40 - - post treatment ; 1.5 cm   Thumb opposition to tip of 4th   Wrist extension/flexion 75 /62   Wrist ulnar/radial deviation 20/10  Forearm pronation/supination 70 /75    Elbow extension/flexion: 23 -115   ;  passive post treatment 20   strength: 50#/8#   Physical Observation: significant limitations with elbow and digit range of motion, MCPs most limited, extensor habitus posture of hand    Edema: moderate    Sensory: no complaints of sensory issues     Good rehab potential    Outcome Measures:  Quick DASH: 35    EDUCATION: home exercise program, plan of care, activity modifications, pain management, and injury pathology     Goals:  Active       OT Goals       OT Goal 1 (Progressing)       Start:  08/02/24    Expected End:  12/06/24       Achieve tips to palm for grasping objects in hand.         OT Goal 2 (Progressing)       Start:  08/02/24    Expected End:  12/06/24       Improve elbow flexion to 120 degrees for dressing tasks.         OT Goal 3 (Progressing)       Start:  08/02/24    Expected End:  12/27/24       Improve elbow extension to 15 degrees for reaching to grasp objects.         OT Goal 4 (Progressing)       Start:  08/02/24    Expected End:  12/27/24       Improve Quick DASH to 15%.           OT Goal 5 (Progressing)       Start:  08/02/24    Expected End:  12/27/24       Improve  strength to 70% of unaffected side for opening jars.           Plan of care was developed with input and agreement by the patient.    Treatments:      Therapeutic  Exercise:   20 min  HEP education and completion: MCP flexion stretch, IP flexion stretch, composite digit flexion stretch, prayer stretch, wrist flexion stretch, and forearm pronation stretch.  Digits positioned into end range digit flexion with coban and use of heating pad to improve tissue extensibility and improve range of motion.   Place and hold into end range digit flexion.   Sponge pickup with hand gripper on light resistance   Grasping with power web and extension stretch.  Therapist adjusted orthosis for improved fit.     Manual Therapy    25 min  Therapist performed manual digit, wrist, and elbow range of motion.  Therapist performed A/P joint mobilization combined with MCP flexion.  Therapist positioned MCPs in end range flexion with patient making active fist.  Therapist perform elbow distraction combined with elbow extension.      Assessment:    Continued end range motion. Patient continues to report good compliance with home program. Adjusted orthosis for digits today. MCPs joint capsules softening. Patient has follow up with PA-C tomorrow. Patient follow up Friday.       Plan:      Planned Interventions include: therapeutic exercise, therapeutic activity, self-care home management, manual therapy, therapeutic activities, gait training, neuromuscular coordination, vasopneumatic, dry needling, aquatic therapy, electric stimulation, fluidotherapy, ultrasound, kinesiotaping, orthosis fabrication, wound care  Frequency: 1-2 x Week  Duration: 12 Weeks      Felipe Lee OT

## 2024-12-04 ENCOUNTER — HOSPITAL ENCOUNTER (OUTPATIENT)
Dept: RADIOLOGY | Facility: CLINIC | Age: 79
Discharge: HOME | End: 2024-12-04
Payer: MEDICARE

## 2024-12-04 ENCOUNTER — APPOINTMENT (OUTPATIENT)
Dept: ORTHOPEDIC SURGERY | Facility: CLINIC | Age: 79
End: 2024-12-04
Payer: MEDICARE

## 2024-12-04 ENCOUNTER — OFFICE VISIT (OUTPATIENT)
Dept: ORTHOPEDIC SURGERY | Facility: CLINIC | Age: 79
End: 2024-12-04
Payer: MEDICARE

## 2024-12-04 VITALS — HEIGHT: 64 IN | BODY MASS INDEX: 24.59 KG/M2 | WEIGHT: 144 LBS

## 2024-12-04 DIAGNOSIS — S42.411A CLOSED FRACTURE OF SUPRACONDYLAR HUMERUS, RIGHT, INITIAL ENCOUNTER: Primary | ICD-10-CM

## 2024-12-04 DIAGNOSIS — S42.411A CLOSED FRACTURE OF SUPRACONDYLAR HUMERUS, RIGHT, INITIAL ENCOUNTER: ICD-10-CM

## 2024-12-04 PROCEDURE — 73070 X-RAY EXAM OF ELBOW: CPT | Mod: LEFT SIDE | Performed by: RADIOLOGY

## 2024-12-04 PROCEDURE — 1160F RVW MEDS BY RX/DR IN RCRD: CPT | Performed by: PHYSICIAN ASSISTANT

## 2024-12-04 PROCEDURE — 99213 OFFICE O/P EST LOW 20 MIN: CPT | Performed by: PHYSICIAN ASSISTANT

## 2024-12-04 PROCEDURE — 1036F TOBACCO NON-USER: CPT | Performed by: PHYSICIAN ASSISTANT

## 2024-12-04 PROCEDURE — 1159F MED LIST DOCD IN RCRD: CPT | Performed by: PHYSICIAN ASSISTANT

## 2024-12-04 PROCEDURE — 73070 X-RAY EXAM OF ELBOW: CPT | Mod: LT

## 2024-12-04 ASSESSMENT — PAIN - FUNCTIONAL ASSESSMENT: PAIN_FUNCTIONAL_ASSESSMENT: NO/DENIES PAIN

## 2024-12-06 ENCOUNTER — APPOINTMENT (OUTPATIENT)
Dept: PRIMARY CARE | Facility: CLINIC | Age: 79
End: 2024-12-06
Payer: MEDICARE

## 2024-12-06 ENCOUNTER — TREATMENT (OUTPATIENT)
Dept: OCCUPATIONAL THERAPY | Facility: HOSPITAL | Age: 79
End: 2024-12-06
Payer: MEDICARE

## 2024-12-06 DIAGNOSIS — Z00.00 HEALTH CARE MAINTENANCE: ICD-10-CM

## 2024-12-06 DIAGNOSIS — S42.411A CLOSED FRACTURE OF SUPRACONDYLAR HUMERUS, RIGHT, INITIAL ENCOUNTER: ICD-10-CM

## 2024-12-06 DIAGNOSIS — D51.9 ANEMIA DUE TO VITAMIN B12 DEFICIENCY, UNSPECIFIED B12 DEFICIENCY TYPE: Primary | ICD-10-CM

## 2024-12-06 PROCEDURE — 96372 THER/PROPH/DIAG INJ SC/IM: CPT | Performed by: INTERNAL MEDICINE

## 2024-12-06 PROCEDURE — 97140 MANUAL THERAPY 1/> REGIONS: CPT | Mod: GO,KX | Performed by: OCCUPATIONAL THERAPIST

## 2024-12-06 PROCEDURE — 97110 THERAPEUTIC EXERCISES: CPT | Mod: GO,KX | Performed by: OCCUPATIONAL THERAPIST

## 2024-12-06 RX ORDER — CYANOCOBALAMIN 1000 UG/ML
1000 INJECTION, SOLUTION INTRAMUSCULAR; SUBCUTANEOUS ONCE
Status: COMPLETED | OUTPATIENT
Start: 2024-12-06 | End: 2024-12-06

## 2024-12-06 NOTE — PROGRESS NOTES
Occupational Therapy  Occupational Therapy Orthopedic Treatment    Patient Name: Gertrudis Cottrell  MRN: [id number]  Today's Date: 12/6/2024    Insurance: MEDICARE A/B / NO AUTH / [place] VISITS - 0 USED OT 8/2/24 / [employer] SUPP ACTIVE / PER RTE   Time:        Time:  Time Calculation  Start Time: 0850  Stop Time: 0930  Time Calculation (min): 40 min  OT Therapeutic Procedures Time Entry  Manual Therapy Time Entry: 20  Therapeutic Exercise Time Entry: 20      Insurance Type: Payor: MEDICARE / Plan: MEDICARE PART A AND B / Product Type: *No Product type* /       Insurance:  Visit number: 29  General:  Reason for visit: L elbow  Referred by: Hilda Patton PA-C    Current Problem  1. Closed fracture of supracondylar humerus, right, initial encounter  Follow Up In Occupational Therapy        Insurance Type: Payor: MEDICARE / Plan: MEDICARE PART A AND B / Product Type: *No Product type* /     Precautions: per post op protocol     Medical History Form: Reviewed (scanned into chart)    Subjective:  I still have trouble with my necklace.    Chief Complaint: L arm/hand  BASILIO: Fall   DOI: 05/12/2024  DOS: 05/30/2024 ORIF L elbow    Hand Dominance: Right    Current Condition since injury:   better      PAIN     Location: L elbow and hand  Intensity: 1/10  up to 6/10  Description: sharp  Aggravating Factors:  functional use  Relieving Factors:  Rest     Relevant Information (PMH & Previous Tests/Imaging): reviewed in chart     Prior Level of Function (PLOF)  Exercise/Physical Activity: jigsaw puzzles  Work/School: retired  Current ADL/IADL Status: independent with homemaking     Patients Living Environment: Reviewed and no concern    Primary Language: English    Pt goals for therapy: improve functional use of L hand for cooking, cleaning, dressing, jigsaw puzzles, and various household chores    Red Flags: Do you have any of the following? No  Fever/chills, unexplained weight changes, dizziness/fainting, unexplained change in bowel or  bladder functions, unexplained malaise or muscle weakness, night pain/sweats, numbness or tingling    Objective:     Distance from tips to palm -; MCP/PIP/DIP flexion ; achieved 1 cm post treatment for all digits  2nd: 1 cm  ; 65/100/75   - post treatment 70; 0 cm    3rd: 1.5 cm   ;  65/100/70  - post treatment 70; 1.5 cm    4th: 2 cm   ;  70/105/40 - post treatment 75; 1.5 cm    5th: 3 cm  ; 55/95/40 - - post treatment ; 1.5 cm   Thumb opposition to tip of 4th   Wrist extension/flexion 75 /62   Wrist ulnar/radial deviation 20/10  Forearm pronation/supination 70 /75    Elbow extension/flexion: 23 -115   ;  passive post treatment 20   strength: 50#/8#   Physical Observation: significant limitations with elbow and digit range of motion, MCPs most limited, extensor habitus posture of hand    Edema: moderate    Sensory: no complaints of sensory issues     Good rehab potential    Outcome Measures:  Quick DASH: 35    EDUCATION: home exercise program, plan of care, activity modifications, pain management, and injury pathology     Goals:  Active       OT Goals       OT Goal 1 (Progressing)       Start:  08/02/24    Expected End:  12/06/24       Achieve tips to palm for grasping objects in hand.         OT Goal 2 (Progressing)       Start:  08/02/24    Expected End:  12/06/24       Improve elbow flexion to 120 degrees for dressing tasks.         OT Goal 3 (Progressing)       Start:  08/02/24    Expected End:  12/27/24       Improve elbow extension to 15 degrees for reaching to grasp objects.         OT Goal 4 (Progressing)       Start:  08/02/24    Expected End:  12/27/24       Improve Quick DASH to 15%.           OT Goal 5 (Progressing)       Start:  08/02/24    Expected End:  12/27/24       Improve  strength to 70% of unaffected side for opening jars.           Plan of care was developed with input and agreement by the patient.    Treatments:      Therapeutic Exercise:   20 min  HEP education and completion: MCP  flexion stretch, IP flexion stretch, composite digit flexion stretch, prayer stretch, wrist flexion stretch, and forearm pronation stretch.  Digits positioned into end range digit flexion with coban and use of heating pad to improve tissue extensibility and improve range of motion.   Place and hold into end range digit flexion.   Sponge pickup with hand gripper on light resistance   Grasping with power web and extension stretch.  Wrist extension and flexion with green flexbar.  Forearm pronation and supination with green flexbar.  Progreesive digi flex red and yellow.    Manual Therapy    20 min  Therapist performed manual digit, wrist, and elbow range of motion.  Therapist performed A/P joint mobilization combined with MCP flexion.  Therapist positioned MCPs in end range flexion with patient making active fist.  Therapist perform elbow distraction combined with elbow extension.      Assessment:    Progressed strengthening exercises today. Patient to follow up next week.       Plan:      Planned Interventions include: therapeutic exercise, therapeutic activity, self-care home management, manual therapy, therapeutic activities, gait training, neuromuscular coordination, vasopneumatic, dry needling, aquatic therapy, electric stimulation, fluidotherapy, ultrasound, kinesiotaping, orthosis fabrication, wound care  Frequency: 1-2 x Week  Duration: 12 Weeks      Felipe Lee, OT

## 2024-12-06 NOTE — PROGRESS NOTES
History of Present Illness   Gertrudis Cottrell is a 79 y.o. female presenting today for post-op check from ORIF left distal humerus fracture on 2024.  She is continue to work with therapy and is quite pleased with how her range of motion has improved.  She does still have some work to do but every time she goes to therapy she has small gains in each direction of motion.  She continues to have some sensory issues in the ulnar distribution and continues on 800 mg of gabapentin 3 times a day.  Most of her discomfort continues to remain in her hand.  Her incision remains well-healed.  Overall she is quite pleased with the progress that she has made.    Past Medical History:   Diagnosis Date    Anemia     Hyperlipidemia     Hypertension     Hypothyroidism     PONV (postoperative nausea and vomiting)        Medication Documentation Review Audit       Reviewed by Ramírez Sheppard on 24 at 0905      Medication Order Taking? Sig Documenting Provider Last Dose Status   atorvastatin (Lipitor) 10 mg tablet [id number]  TAKE 1 TABLET (10 MG) BY MOUTH ONCE DAILY AT BEDTIME. Ezequiel Briseno MD  Active   calcium carbonate/vitamin D3 (CALCIUM + D ORAL) [id number] No Take by mouth. Historical Provider, MD Past Week Active   cyanocobalamin (Vitamin B-12) 1,000 mcg/mL injection [id number]  INJECT 1ML IN THE MUSCLE ONCE A MONTH Ezequiel Briseno MD  Active   cyanocobalamin (Vitamin B-12) injection 1,000 mcg [id number]   Ezequiel Briseno MD  Active   cyanocobalamin (Vitamin B-12) injection 1,000 mcg [id number]   Ezequiel Briseno MD  Active   cyanocobalamin (Vitamin B-12) injection 1,000 mcg [id number]   Ezequiel Briseno MD  Active   cyanocobalamin (Vitamin B-12) injection 1,000 mcg [id number]   Ezequiel Briseno MD  Active   gabapentin (Neurontin) 800 mg tablet [id number]  Take 1 tablet (800 mg) by mouth 3 times a day. Hilda Patton PA-C   24 2359   HYDROcodone-acetaminophen (Norco) 5-325 mg tablet [id number]  Take 1  tablet by mouth every 6 hours if needed for severe pain (7 - 10). Hilda Patton PA-C  Active   levothyroxine (Synthroid, Levoxyl) 100 mcg tablet [id number]  TAKE 1 TABLET (100 MCG) BY MOUTH ONCE DAILY IN THE MORNING. TAKE BEFORE MEALS. Ezequiel Briseno MD  Active   losartan (Cozaar) 25 mg tablet [id number]  TAKE 1 TABLET BY MOUTH EVERY DAY Ezequiel Briseno MD  Active                    Allergies   Allergen Reactions    Ciprofloxacin Diarrhea       Social History     Socioeconomic History    Marital status:      Spouse name: Not on file    Number of children: Not on file    Years of education: Not on file    Highest education level: Not on file   Occupational History    Not on file   Tobacco Use    Smoking status: Never    Smokeless tobacco: Never   Vaping Use    Vaping status: Never Used   Substance and Sexual Activity    Alcohol use: Not Currently    Drug use: Not Currently    Sexual activity: Not on file   Other Topics Concern    Not on file   Social History Narrative    Not on file     Social Drivers of Health     Financial Resource Strain: Not on file   Food Insecurity: Not on file   Transportation Needs: Not on file   Physical Activity: Not on file   Stress: Not on file   Social Connections: Not on file   Intimate Partner Violence: Not on file   Housing Stability: Not on file       Past Surgical History:   Procedure Laterality Date    BREAST BIOPSY  1980s    HYSTERECTOMY  1997          Review of Systems:  30 point ROS reviewed and negative other than as listed in the HPI     Physical Exam:  Gen: The pt is A&Ox3, NAD, and appear state age and weight  Psychiatric: mood and affect are appropriate   Eyes: sclera are white, EOM grossly intact  ENT: MMM  Neck: supple, thyroid is midline  Respiratory: respirations are nonlabored, chest rise symmetric  CV: rate is regular by palpation of distal pulses  Abdomen: nondistended   Integument: no obvious cutaneous lesions noted. No signs of lymphangitis. No signs of  systemic edema.   MSK: Left elbow surgical incision well healing without edema, erythema, drainage, or other s/sx of infection.  Range of motion is about 20 degrees lacking of full extension and flexing to 120 degrees.  She also has some metacarpal phalangeal joint contractures in digits 2 3 and 5 most predominantly.  She cannot actively make a full fist yet but is able to passively push her fingers into a full fist formation.  She has some altered sensation only to the ulnar aspect of her fifth digit.  She is making continued improvement overall per her report.  Sensation is intact to light touch in the axillary, median and radial nerve distributions distally. The hand is warm and well-perfused.    Imaging:  I personally reviewed multiple views of the left elbow were obtained in the office today demonstrate maintenance of reduction, interval healing, and a stable position of the hardware.  There is no sign of posttraumatic arthritis.     Assessment   79 y.o. female post-op from open reduction internal fixation left distal humerus fracture on 5/30/2024.  She still having issues with hand flexion contracture and elbow flexion and extension contracture, but overall she is significantly improved.  She should continue to work with therapy and do her stretches.     At this point we will have her start to taper her gabapentin to determine if that is actually helping with her ulnar nerve pain.  If her pain is no different with weaning then she can stop it altogether.  Since the ulnar nerve sensation issues seem to be subsiding in severity overall she would like to hold off on seeing somebody for this issue but if she continues to have neuralgia would recommend her to see one of our hand partners.  She will keep me posted on how she is doing with this.    We will check her back for 1 last visit in 6 months with 2 views of her left elbow at that time.  She can continue with no range of motion restrictions and no lifting  restrictions.    All of the patient's questions/concerns address and they are in agreement with the plan.

## 2024-12-10 ENCOUNTER — TREATMENT (OUTPATIENT)
Dept: OCCUPATIONAL THERAPY | Facility: HOSPITAL | Age: 79
End: 2024-12-10
Payer: MEDICARE

## 2024-12-10 DIAGNOSIS — S42.411A CLOSED FRACTURE OF SUPRACONDYLAR HUMERUS, RIGHT, INITIAL ENCOUNTER: ICD-10-CM

## 2024-12-10 PROCEDURE — 97110 THERAPEUTIC EXERCISES: CPT | Mod: GO,KX | Performed by: OCCUPATIONAL THERAPIST

## 2024-12-10 PROCEDURE — 97140 MANUAL THERAPY 1/> REGIONS: CPT | Mod: GO,KX | Performed by: OCCUPATIONAL THERAPIST

## 2024-12-10 NOTE — PROGRESS NOTES
Occupational Therapy  Occupational Therapy Orthopedic Treatment    Patient Name: Gertrudis Cottrell  MRN: [id number]  Today's Date: 12/10/2024    Insurance: MEDICARE A/B / NO AUTH / [place] VISITS - 0 USED OT 8/2/24 / [employer] SUPP ACTIVE / PER RTE   Time:        Time:         Insurance Type: Payor: MEDICARE / Plan: MEDICARE PART A AND B / Product Type: *No Product type* /       Insurance:  Visit number: 29  General:  Reason for visit: L elbow  Referred by: Hilda Patton PA-C    Current Problem  1. Closed fracture of supracondylar humerus, right, initial encounter  Follow Up In Occupational Therapy        Insurance Type: Payor: MEDICARE / Plan: MEDICARE PART A AND B / Product Type: *No Product type* /     Precautions: per post op protocol     Medical History Form: Reviewed (scanned into chart)    Subjective:  I have been working on the beads. I still have trouble manipulating my necklace because of my elbow stiffness.     Chief Complaint: L arm/hand  BASILIO: Fall   DOI: 05/12/2024  DOS: 05/30/2024 ORIF L elbow    Hand Dominance: Right    Current Condition since injury:   better      PAIN     Location: L elbow and hand  Intensity: 1/10  up to 6/10  Description: sharp  Aggravating Factors:  functional use  Relieving Factors:  Rest     Relevant Information (PMH & Previous Tests/Imaging): reviewed in chart     Prior Level of Function (PLOF)  Exercise/Physical Activity: jigsaw puzzles  Work/School: retired  Current ADL/IADL Status: independent with homemaking     Patients Living Environment: Reviewed and no concern    Primary Language: English    Pt goals for therapy: improve functional use of L hand for cooking, cleaning, dressing, jigsaw puzzles, and various household chores    Red Flags: Do you have any of the following? No  Fever/chills, unexplained weight changes, dizziness/fainting, unexplained change in bowel or bladder functions, unexplained malaise or muscle weakness, night pain/sweats, numbness or  tingling    Objective:     Distance from tips to palm -; MCP/PIP/DIP flexion ; achieved 1 cm post treatment for all digits  2nd: 1 cm  ; 65/100/75   - post treatment 70; 0 cm    3rd: 1.5 cm   ;  65/100/70  - post treatment 70; 1.5 cm    4th: 2 cm   ;  70/105/40 - post treatment 75; 1.5 cm    5th: 3 cm  ; 55/95/40 - - post treatment ; 1.5 cm   Thumb opposition to tip of 4th   Wrist extension/flexion 75 /62   Wrist ulnar/radial deviation 20/10  Forearm pronation/supination 70 /75    Elbow extension/flexion: 23 -115   ;  passive post treatment    strength: 50#/8#   Physical Observation: significant limitations with elbow and digit range of motion, MCPs most limited, extensor habitus posture of hand    Edema: moderate    Sensory: no complaints of sensory issues     Good rehab potential    Outcome Measures:  Quick DASH: 35    EDUCATION: home exercise program, plan of care, activity modifications, pain management, and injury pathology     Goals:  Active       OT Goals       OT Goal 1 (Progressing)       Start:  08/02/24    Expected End:  12/06/24       Achieve tips to palm for grasping objects in hand.         OT Goal 2 (Progressing)       Start:  08/02/24    Expected End:  12/06/24       Improve elbow flexion to 120 degrees for dressing tasks.         OT Goal 3 (Progressing)       Start:  08/02/24    Expected End:  12/27/24       Improve elbow extension to 15 degrees for reaching to grasp objects.         OT Goal 4 (Progressing)       Start:  08/02/24    Expected End:  12/27/24       Improve Quick DASH to 15%.           OT Goal 5 (Progressing)       Start:  08/02/24    Expected End:  12/27/24       Improve  strength to 70% of unaffected side for opening jars.           Plan of care was developed with input and agreement by the patient.    Treatments:      Therapeutic Exercise:   20 min  HEP education and completion: MCP flexion stretch, IP flexion stretch, composite digit flexion stretch, prayer stretch,  wrist flexion stretch, and forearm pronation stretch.  Digits positioned into end range digit flexion with coban and use of heating pad to improve tissue extensibility and improve range of motion.   Place and hold into end range digit flexion.   Sponge pickup with hand gripper on light resistance   Grasping with power web and extension stretch.  Wrist extension and flexion with red flexbar.  Forearm pronation and supination with red flexbar.  Elbow extension stretch against wall.      Manual Therapy    25 min  Therapist performed manual digit, wrist, and elbow range of motion.  Therapist performed A/P joint mobilization combined with MCP flexion.  Therapist positioned MCPs in end range flexion with patient making active fist.  Therapist perform elbow distraction combined with elbow extension.      Assessment:    Continued working on end range digit and elbow motion. Passive elbow flexion up to 120 degrees today. Patient reports good compliance with stretches and mobilization splints.      Plan:      Planned Interventions include: therapeutic exercise, therapeutic activity, self-care home management, manual therapy, therapeutic activities, gait training, neuromuscular coordination, vasopneumatic, dry needling, aquatic therapy, electric stimulation, fluidotherapy, ultrasound, kinesiotaping, orthosis fabrication, wound care  Frequency: 1-2 x Week  Duration: 12 Weeks      Felipe Lee, OT   Price (Do Not Change): 0.00 Instructions: This plan will send the code FBSD to the PM system.  DO NOT or CHANGE the price. Detail Level: Simple

## 2024-12-13 ENCOUNTER — TREATMENT (OUTPATIENT)
Dept: OCCUPATIONAL THERAPY | Facility: HOSPITAL | Age: 79
End: 2024-12-13
Payer: MEDICARE

## 2024-12-13 ENCOUNTER — PATIENT MESSAGE (OUTPATIENT)
Dept: ORTHOPEDIC SURGERY | Facility: CLINIC | Age: 79
End: 2024-12-13

## 2024-12-13 DIAGNOSIS — S42.492A CLOSED BICONDYLAR FRACTURE OF DISTAL HUMERUS, LEFT, INITIAL ENCOUNTER: Primary | ICD-10-CM

## 2024-12-13 DIAGNOSIS — S42.411A CLOSED FRACTURE OF SUPRACONDYLAR HUMERUS, RIGHT, INITIAL ENCOUNTER: ICD-10-CM

## 2024-12-13 PROCEDURE — 97140 MANUAL THERAPY 1/> REGIONS: CPT | Mod: GO,KX | Performed by: OCCUPATIONAL THERAPIST

## 2024-12-13 PROCEDURE — 97110 THERAPEUTIC EXERCISES: CPT | Mod: GO,KX | Performed by: OCCUPATIONAL THERAPIST

## 2024-12-13 NOTE — PROGRESS NOTES
Occupational Therapy  Occupational Therapy Orthopedic Treatment    Patient Name: Gertrudis Cottrell  MRN: [id number]  Today's Date: 12/13/2024  Time:  Time Calculation  Start Time: 0845  Stop Time: 0930  Time Calculation (min): 45 min  OT Therapeutic Procedures Time Entry  Manual Therapy Time Entry: 25  Therapeutic Exercise Time Entry: 20      Insurance Type: Payor: MEDICARE / Plan: MEDICARE PART A AND B / Product Type: *No Product type* /         Insurance:  Visit number: 30  General:  Reason for visit: L elbow  Referred by: Hilda Patton PA-C    Current Problem  1. Closed fracture of supracondylar humerus, right, initial encounter  Follow Up In Occupational Therapy        Insurance Type: Payor: MEDICARE / Plan: MEDICARE PART A AND B / Product Type: *No Product type* /     Precautions: per post op protocol     Medical History Form: Reviewed (scanned into chart)    Subjective:  I have been working on the beads. I still have trouble manipulating my necklace because of my elbow stiffness.     Chief Complaint: L arm/hand  BASILIO: Fall   DOI: 05/12/2024  DOS: 05/30/2024 ORIF L elbow    Hand Dominance: Right    Current Condition since injury:   better      PAIN     Location: L elbow and hand  Intensity: 1/10  up to 6/10  Description: sharp  Aggravating Factors:  functional use  Relieving Factors:  Rest     Relevant Information (PMH & Previous Tests/Imaging): reviewed in chart     Prior Level of Function (PLOF)  Exercise/Physical Activity: jigsaw puzzles  Work/School: retired  Current ADL/IADL Status: independent with homemaking     Patients Living Environment: Reviewed and no concern    Primary Language: English    Pt goals for therapy: improve functional use of L hand for cooking, cleaning, dressing, jigsaw puzzles, and various household chores    Red Flags: Do you have any of the following? No  Fever/chills, unexplained weight changes, dizziness/fainting, unexplained change in bowel or bladder functions, unexplained  malaise or muscle weakness, night pain/sweats, numbness or tingling    Objective:     Distance from tips to palm -; MCP/PIP/DIP flexion ; achieved 1 cm post treatment for all digits  2nd: 1 cm  ; 65/100/75   - post treatment 75 was 70; 0 cm    3rd: 1.5 cm   ;  65/100/70  - post treatment 75 was 70; 1.5 cm    4th: 2 cm   ;  70/105/40 - post treatment 75; 1.5 cm    5th: 3 cm  ; 55/95/40 - - post treatment 60; 1.5 cm   Thumb opposition to tip of 4th   Wrist extension/flexion 75 /62   Wrist ulnar/radial deviation 20/10  Forearm pronation/supination 70 /75    Elbow extension/flexion: 23 -115   ;  passive post treatment    strength: 50#/8#  level 3 was 15#  Physical Observation: significant limitations with elbow and digit range of motion, MCPs most limited, extensor habitus posture of hand    Edema: moderate    Sensory: no complaints of sensory issues     Good rehab potential    Outcome Measures:  Quick DASH: 35    EDUCATION: home exercise program, plan of care, activity modifications, pain management, and injury pathology     Goals:  Active       OT Goals       OT Goal 1 (Progressing)       Start:  08/02/24    Expected End:  12/06/24       Achieve tips to palm for grasping objects in hand.         OT Goal 2 (Progressing)       Start:  08/02/24    Expected End:  12/06/24       Improve elbow flexion to 120 degrees for dressing tasks.         OT Goal 3 (Progressing)       Start:  08/02/24    Expected End:  12/27/24       Improve elbow extension to 15 degrees for reaching to grasp objects.         OT Goal 4 (Progressing)       Start:  08/02/24    Expected End:  12/27/24       Improve Quick DASH to 15%.           OT Goal 5 (Progressing)       Start:  08/02/24    Expected End:  12/27/24       Improve  strength to 70% of unaffected side for opening jars.           Plan of care was developed with input and agreement by the patient.    Treatments:      Therapeutic Exercise:   20 min  HEP education and completion:  MCP flexion stretch, IP flexion stretch, composite digit flexion stretch, prayer stretch, wrist flexion stretch, and forearm pronation stretch.  Digits positioned into end range digit flexion with coban and use of heating pad to improve tissue extensibility and improve range of motion.   Place and hold into end range digit flexion.   Sponge pickup with hand gripper on light resistance   Grasping with power web and extension stretch.  Wrist extension and flexion with red flexbar.  Forearm pronation and supination with red flexbar.  Elbow extension stretch against wall.      Manual Therapy    25 min  Therapist performed manual digit, wrist, and elbow range of motion.  Therapist performed A/P joint mobilization combined with MCP flexion.  Therapist positioned MCPs in end range flexion with patient making active fist.  Therapist perform elbow distraction combined with elbow extension.      Assessment:    Post treatment flexion improved. Patient to continue working end ranges.  strength unchanged at level 2 and improved to 15# on level 3. Patient to follow up next week.       Plan:      Planned Interventions include: therapeutic exercise, therapeutic activity, self-care home management, manual therapy, therapeutic activities, gait training, neuromuscular coordination, vasopneumatic, dry needling, aquatic therapy, electric stimulation, fluidotherapy, ultrasound, kinesiotaping, orthosis fabrication, wound care  Frequency: 1-2 x Week  Duration: 12 Weeks      Felipe Lee OT

## 2024-12-18 DIAGNOSIS — Z00.00 HEALTH CARE MAINTENANCE: ICD-10-CM

## 2024-12-20 ENCOUNTER — TREATMENT (OUTPATIENT)
Dept: OCCUPATIONAL THERAPY | Facility: HOSPITAL | Age: 79
End: 2024-12-20
Payer: MEDICARE

## 2024-12-20 DIAGNOSIS — S42.411A CLOSED FRACTURE OF SUPRACONDYLAR HUMERUS, RIGHT, INITIAL ENCOUNTER: ICD-10-CM

## 2024-12-20 PROCEDURE — 97110 THERAPEUTIC EXERCISES: CPT | Mod: GO | Performed by: OCCUPATIONAL THERAPIST

## 2024-12-20 PROCEDURE — 97140 MANUAL THERAPY 1/> REGIONS: CPT | Mod: GO | Performed by: OCCUPATIONAL THERAPIST

## 2024-12-20 RX ORDER — ATORVASTATIN CALCIUM 10 MG/1
10 TABLET, FILM COATED ORAL NIGHTLY
Qty: 90 TABLET | Refills: 0 | Status: SHIPPED | OUTPATIENT
Start: 2024-12-20

## 2024-12-20 RX ORDER — LEVOTHYROXINE SODIUM 100 UG/1
100 TABLET ORAL
Qty: 90 TABLET | Refills: 0 | Status: SHIPPED | OUTPATIENT
Start: 2024-12-20

## 2024-12-20 NOTE — PROGRESS NOTES
Occupational Therapy  Occupational Therapy Orthopedic Treatment    Patient Name: Gertrudis Cottrell  MRN: [id number]  Today's Date: 12/20/2024  Time:       Time:  Time Calculation  Start Time: 0845  Stop Time: 0925  Time Calculation (min): 40 min  OT Therapeutic Procedures Time Entry  Manual Therapy Time Entry: 20  Therapeutic Exercise Time Entry: 20         Insurance Type: Payor: MEDICARE / Plan: MEDICARE PART A AND B / Product Type: *No Product type* /         Insurance:  Visit number: 31  General:  Reason for visit: L elbow  Referred by: Hilda Patton PA-C    Current Problem  1. Closed fracture of supracondylar humerus, right, initial encounter  Follow Up In Occupational Therapy        Insurance Type: Payor: MEDICARE / Plan: MEDICARE PART A AND B / Product Type: *No Product type* /     Precautions: per post op protocol     Medical History Form: Reviewed (scanned into chart)    Subjective:  I was using my hand to play games over the past week.     Chief Complaint: L arm/hand  BASILIO: Fall   DOI: 05/12/2024  DOS: 05/30/2024 ORIF L elbow    Hand Dominance: Right    Current Condition since injury:   better      PAIN     Location: L elbow and hand  Intensity: 1/10  up to 6/10  Description: sharp  Aggravating Factors:  functional use  Relieving Factors:  Rest     Relevant Information (PMH & Previous Tests/Imaging): reviewed in chart     Prior Level of Function (PLOF)  Exercise/Physical Activity: jigsaw puzzles  Work/School: retired  Current ADL/IADL Status: independent with homemaking     Patients Living Environment: Reviewed and no concern    Primary Language: English    Pt goals for therapy: improve functional use of L hand for cooking, cleaning, dressing, jigsaw puzzles, and various household chores    Red Flags: Do you have any of the following? No  Fever/chills, unexplained weight changes, dizziness/fainting, unexplained change in bowel or bladder functions, unexplained malaise or muscle weakness, night pain/sweats,  numbness or tingling    Objective:     Distance from tips to palm -; MCP/PIP/DIP flexion ; achieved 1 cm post treatment for all digits  2nd: 1 cm  ; 65/100/75   - post treatment 75 was 70; 0 cm    3rd: 1.5 cm   ;  65/100/70  - post treatment 75 was 70; 1.5 cm    4th: 2 cm   ;  70/105/40 - post treatment 75; 1.5 cm    5th: 3 cm  ; 55/95/40 - - post treatment 70 was 60; 1.5 cm   Thumb opposition to tip of 4th   Wrist extension/flexion 75 /62   Wrist ulnar/radial deviation 20/10  Forearm pronation/supination 70 /75    Elbow extension/flexion: 23 -115   ;  passive post treatment    strength: 50#/8#  level 3 was 15#  Physical Observation: significant limitations with elbow and digit range of motion, MCPs most limited, extensor habitus posture of hand    Edema: moderate    Sensory: no complaints of sensory issues     Good rehab potential    Outcome Measures:  Quick DASH: 35    EDUCATION: home exercise program, plan of care, activity modifications, pain management, and injury pathology     Goals:  Active       OT Goals       OT Goal 1 (Progressing)       Start:  08/02/24    Expected End:  12/06/24       Achieve tips to palm for grasping objects in hand.         OT Goal 2 (Progressing)       Start:  08/02/24    Expected End:  12/06/24       Improve elbow flexion to 120 degrees for dressing tasks.         OT Goal 3 (Progressing)       Start:  08/02/24    Expected End:  12/27/24       Improve elbow extension to 15 degrees for reaching to grasp objects.         OT Goal 4 (Progressing)       Start:  08/02/24    Expected End:  12/27/24       Improve Quick DASH to 15%.           OT Goal 5 (Progressing)       Start:  08/02/24    Expected End:  12/27/24       Improve  strength to 70% of unaffected side for opening jars.           Plan of care was developed with input and agreement by the patient.    Treatments:      Therapeutic Exercise:   20 min  HEP education and completion: MCP flexion stretch, IP flexion stretch,  composite digit flexion stretch, prayer stretch, wrist flexion stretch, and forearm pronation stretch.  Digits positioned into end range digit flexion with coban and use of heating pad to improve tissue extensibility and improve range of motion.   Place and hold into end range digit flexion.   Sponge pickup with hand gripper on light resistance   Grasping with power web and extension stretch.  Wrist extension and flexion with red flexbar.  Forearm pronation and supination with red flexbar.  Putty tools.  Digiflex red and yellow.      Manual Therapy    20 min  Therapist performed manual digit, wrist, and elbow range of motion.  Therapist performed A/P joint mobilization combined with MCP flexion.  Therapist positioned MCPs in end range flexion with patient making active fist.  Therapist perform elbow distraction combined with elbow extension.      Assessment:    Continued range of motion for digits and elbow. Progressed strengthening exercises today. Patient to continue increasing functional use as tolerated.      Plan:      Planned Interventions include: therapeutic exercise, therapeutic activity, self-care home management, manual therapy, therapeutic activities, gait training, neuromuscular coordination, vasopneumatic, dry needling, aquatic therapy, electric stimulation, fluidotherapy, ultrasound, kinesiotaping, orthosis fabrication, wound care  Frequency: 1-2 x Week  Duration: 12 Weeks      Felipe Lee, OT

## 2024-12-30 DIAGNOSIS — Z00.00 HEALTH CARE MAINTENANCE: ICD-10-CM

## 2024-12-31 ENCOUNTER — TREATMENT (OUTPATIENT)
Dept: OCCUPATIONAL THERAPY | Facility: HOSPITAL | Age: 79
End: 2024-12-31
Payer: MEDICARE

## 2024-12-31 DIAGNOSIS — S42.411A CLOSED FRACTURE OF SUPRACONDYLAR HUMERUS, RIGHT, INITIAL ENCOUNTER: ICD-10-CM

## 2024-12-31 PROCEDURE — 97140 MANUAL THERAPY 1/> REGIONS: CPT | Mod: GO | Performed by: OCCUPATIONAL THERAPIST

## 2024-12-31 PROCEDURE — 97110 THERAPEUTIC EXERCISES: CPT | Mod: GO | Performed by: OCCUPATIONAL THERAPIST

## 2024-12-31 NOTE — PROGRESS NOTES
Occupational Therapy  Occupational Therapy Orthopedic Treatment    Patient Name: Gertrudis Cottrell  MRN: [id number]  Today's Date: 12/31/2024      Insurance Type: Payor: MEDICARE / Plan: MEDICARE PART A AND B / Product Type: *No Product type* /     Time:  Time Calculation  Start Time: 0850  Stop Time: 0930  Time Calculation (min): 40 min  OT Therapeutic Procedures Time Entry  Manual Therapy Time Entry: 20  Therapeutic Exercise Time Entry: 20          Insurance:  Visit number: 32  General:  Reason for visit: L elbow  Referred by: Hilda Patton PA-C    Current Problem  1. Closed fracture of supracondylar humerus, right, initial encounter  Follow Up In Occupational Therapy        Insurance Type: Payor: MEDICARE / Plan: MEDICARE PART A AND B / Product Type: *No Product type* /     Precautions: per post op protocol     Medical History Form: Reviewed (scanned into chart)    Subjective: It feels like there is a band across my elbow. I can touch my little finger with my thumb now.    Chief Complaint: L arm/hand  BASILIO: Fall   DOI: 05/12/2024  DOS: 05/30/2024 ORIF L elbow    Hand Dominance: Right    Current Condition since injury:   better      PAIN     Location: L elbow and hand  Intensity: 1/10  up to 6/10  Description: sharp  Aggravating Factors:  functional use  Relieving Factors:  Rest     Relevant Information (PMH & Previous Tests/Imaging): reviewed in chart     Prior Level of Function (PLOF)  Exercise/Physical Activity: jigsaw puzzles  Work/School: retired  Current ADL/IADL Status: independent with homemaking     Patients Living Environment: Reviewed and no concern    Primary Language: English    Pt goals for therapy: improve functional use of L hand for cooking, cleaning, dressing, jigsaw puzzles, and various household chores    Red Flags: Do you have any of the following? No  Fever/chills, unexplained weight changes, dizziness/fainting, unexplained change in bowel or bladder functions, unexplained malaise or muscle  weakness, night pain/sweats, numbness or tingling    Objective:     Distance from tips to palm -; MCP/PIP/DIP flexion ; achieved 1 cm post treatment for all digits  2nd: 1 cm  ; 65/100/75   - post treatment 75 was 70; 0 cm    3rd: 1.5 cm   ;  65/100/70  - post treatment 75 was 70; 1.5 cm    4th: 2 cm   ;  70/105/40 - post treatment 75; 1.5 cm    5th: 3 cm  ; 55/95/40 - - post treatment 70 was 60; 1.5 cm   Thumb opposition to tip of 4th   Wrist extension/flexion 75 /62   Wrist ulnar/radial deviation 20/10  Forearm pronation/supination 70 /75    Elbow extension/flexion: 23 -115   ;  passive post treatment    strength: 50#/12# was 8#  level 3 was 15#  Physical Observation: significant limitations with elbow and digit range of motion, MCPs most limited, extensor habitus posture of hand    Edema: moderate    Sensory: no complaints of sensory issues     Good rehab potential    Outcome Measures:  Quick DASH: 35    EDUCATION: home exercise program, plan of care, activity modifications, pain management, and injury pathology     Goals:  Active       OT Goals       OT Goal 1 (Progressing)       Start:  08/02/24    Expected End:  12/06/24       Achieve tips to palm for grasping objects in hand.         OT Goal 2 (Progressing)       Start:  08/02/24    Expected End:  12/06/24       Improve elbow flexion to 120 degrees for dressing tasks.         OT Goal 3 (Progressing)       Start:  08/02/24    Expected End:  12/27/24       Improve elbow extension to 15 degrees for reaching to grasp objects.         OT Goal 4 (Progressing)       Start:  08/02/24    Expected End:  12/27/24       Improve Quick DASH to 15%.           OT Goal 5 (Progressing)       Start:  08/02/24    Expected End:  12/27/24       Improve  strength to 70% of unaffected side for opening jars.           Plan of care was developed with input and agreement by the patient.    Treatments:      Therapeutic Exercise:   20 min  HEP education and completion: MCP  flexion stretch, IP flexion stretch, composite digit flexion stretch, prayer stretch, wrist flexion stretch, and forearm pronation stretch.  Digits positioned into end range digit flexion with coban and use of heating pad to improve tissue extensibility and improve range of motion.   Place and hold into end range digit flexion.   Sponge pickup with hand gripper on light resistance   Grasping with power web and extension stretch.  Wrist extension and flexion with red flexbar.  Forearm pronation and supination with red flexbar.  Putty tools.      Manual Therapy    20 min  Therapist performed manual digit, wrist, and elbow range of motion.  Therapist performed A/P joint mobilization combined with MCP flexion.  Therapist positioned MCPs in end range flexion with patient making active fist.  Therapist perform elbow distraction combined with elbow extension.      Assessment:      Continued strengthening and range of motion exercises today.  strength improved.         Plan:      Planned Interventions include: therapeutic exercise, therapeutic activity, self-care home management, manual therapy, therapeutic activities, gait training, neuromuscular coordination, vasopneumatic, dry needling, aquatic therapy, electric stimulation, fluidotherapy, ultrasound, kinesiotaping, orthosis fabrication, wound care  Frequency: 1-2 x Week  Duration: 12 Weeks      Felipe Lee, OT

## 2025-01-01 RX ORDER — LOSARTAN POTASSIUM 25 MG/1
25 TABLET ORAL DAILY
Qty: 90 TABLET | Refills: 0 | Status: SHIPPED | OUTPATIENT
Start: 2025-01-01

## 2025-01-03 ENCOUNTER — APPOINTMENT (OUTPATIENT)
Dept: OCCUPATIONAL THERAPY | Facility: HOSPITAL | Age: 80
End: 2025-01-03
Payer: MEDICARE

## 2025-01-03 ENCOUNTER — APPOINTMENT (OUTPATIENT)
Dept: PRIMARY CARE | Facility: CLINIC | Age: 80
End: 2025-01-03
Payer: MEDICARE

## 2025-01-03 DIAGNOSIS — Z00.00 HEALTH CARE MAINTENANCE: ICD-10-CM

## 2025-01-03 DIAGNOSIS — E53.8 B12 DEFICIENCY: Primary | ICD-10-CM

## 2025-01-03 PROCEDURE — 96372 THER/PROPH/DIAG INJ SC/IM: CPT | Performed by: INTERNAL MEDICINE

## 2025-01-03 RX ORDER — CYANOCOBALAMIN 1000 UG/ML
1000 INJECTION, SOLUTION INTRAMUSCULAR; SUBCUTANEOUS ONCE
Status: COMPLETED | OUTPATIENT
Start: 2025-01-03 | End: 2025-01-03

## 2025-01-03 RX ADMIN — CYANOCOBALAMIN 1000 MCG: 1000 INJECTION, SOLUTION INTRAMUSCULAR; SUBCUTANEOUS at 14:50

## 2025-01-07 ENCOUNTER — HOSPITAL ENCOUNTER (OUTPATIENT)
Dept: NEUROLOGY | Facility: CLINIC | Age: 80
Discharge: HOME | End: 2025-01-07
Payer: MEDICARE

## 2025-01-07 ENCOUNTER — APPOINTMENT (OUTPATIENT)
Dept: OCCUPATIONAL THERAPY | Facility: HOSPITAL | Age: 80
End: 2025-01-07
Payer: MEDICARE

## 2025-01-07 DIAGNOSIS — S42.492A CLOSED BICONDYLAR FRACTURE OF DISTAL HUMERUS, LEFT, INITIAL ENCOUNTER: ICD-10-CM

## 2025-01-07 PROCEDURE — 95886 MUSC TEST DONE W/N TEST COMP: CPT | Performed by: PSYCHIATRY & NEUROLOGY

## 2025-01-07 PROCEDURE — 95912 NRV CNDJ TEST 11-12 STUDIES: CPT | Performed by: PSYCHIATRY & NEUROLOGY

## 2025-01-10 ENCOUNTER — TREATMENT (OUTPATIENT)
Dept: OCCUPATIONAL THERAPY | Facility: HOSPITAL | Age: 80
End: 2025-01-10
Payer: MEDICARE

## 2025-01-10 DIAGNOSIS — S42.411A CLOSED FRACTURE OF SUPRACONDYLAR HUMERUS, RIGHT, INITIAL ENCOUNTER: ICD-10-CM

## 2025-01-10 PROCEDURE — 97140 MANUAL THERAPY 1/> REGIONS: CPT | Mod: GO | Performed by: OCCUPATIONAL THERAPIST

## 2025-01-10 PROCEDURE — 97110 THERAPEUTIC EXERCISES: CPT | Mod: GO | Performed by: OCCUPATIONAL THERAPIST

## 2025-01-10 NOTE — PROGRESS NOTES
Occupational Therapy  Occupational Therapy Orthopedic Treatment    Patient Name: Gertrudis Cottrell  MRN: [id number]  Today's Date: 1/10/2025       Time:  Time Calculation  Start Time: 0850  Stop Time: 0930  Time Calculation (min): 40 min  OT Therapeutic Procedures Time Entry  Manual Therapy Time Entry: 20  Therapeutic Exercise Time Entry: 20       Insurance Type: Payor: MEDICARE / Plan: MEDICARE PART A AND B / Product Type: *No Product type* /         Insurance:  Visit number: 32  General:  Reason for visit: L elbow  Referred by: Hilda Patton PA-C    Current Problem  1. Closed fracture of supracondylar humerus, right, initial encounter  Follow Up In Occupational Therapy        Insurance Type: Payor: MEDICARE / Plan: MEDICARE PART A AND B / Product Type: *No Product type* /     Precautions: per post op protocol     Medical History Form: Reviewed (scanned into chart)    Subjective: I can put my ear rings in more easily. I can take my necklace off.     Chief Complaint: L arm/hand  BASILIO: Fall   DOI: 05/12/2024  DOS: 05/30/2024 ORIF L elbow    Hand Dominance: Right    Current Condition since injury:   better      PAIN     Location: L elbow and hand  Intensity: 1/10  up to 6/10  Description: sharp  Aggravating Factors:  functional use  Relieving Factors:  Rest     Relevant Information (PMH & Previous Tests/Imaging): reviewed in chart     Prior Level of Function (PLOF)  Exercise/Physical Activity: jigsaw puzzles  Work/School: retired  Current ADL/IADL Status: independent with homemaking     Patients Living Environment: Reviewed and no concern    Primary Language: English    Pt goals for therapy: improve functional use of L hand for cooking, cleaning, dressing, jigsaw puzzles, and various household chores    Red Flags: Do you have any of the following? No  Fever/chills, unexplained weight changes, dizziness/fainting, unexplained change in bowel or bladder functions, unexplained malaise or muscle weakness, night pain/sweats,  numbness or tingling    Objective:     Distance from tips to palm -; MCP/PIP/DIP flexion ; achieved 1 cm post treatment for all digits  2nd: 1 cm  ; 65/100/75   - post treatment 75 was 70; 0 cm    3rd: 1.5 cm   ;  65/100/70  - post treatment 75 was 70; 1.5 cm    4th: 2 cm   ;  70/105/40 - post treatment 75; 1.5 cm    5th: 3 cm  ; 55/95/40 - - post treatment 70 was 60; 1.5 cm   Thumb opposition to tip of 4th   Wrist extension/flexion 75 /62   Wrist ulnar/radial deviation 20/10  Forearm pronation/supination 70 /75    Elbow extension/flexion: 23 -115   ;  passive post treatment    strength: 50#/12#  level 3 was 15#  Physical Observation: significant limitations with elbow and digit range of motion, MCPs most limited, extensor habitus posture of hand    Edema: moderate    Sensory: no complaints of sensory issues     Good rehab potential    Outcome Measures:  Quick DASH: 35    EDUCATION: home exercise program, plan of care, activity modifications, pain management, and injury pathology     Goals:  Active       OT Goals       OT Goal 1 (Progressing)       Start:  08/02/24    Expected End:  12/06/24       Achieve tips to palm for grasping objects in hand.         OT Goal 2 (Progressing)       Start:  08/02/24    Expected End:  12/06/24       Improve elbow flexion to 120 degrees for dressing tasks.         OT Goal 3 (Progressing)       Start:  08/02/24    Expected End:  12/27/24       Improve elbow extension to 15 degrees for reaching to grasp objects.         OT Goal 4 (Progressing)       Start:  08/02/24    Expected End:  12/27/24       Improve Quick DASH to 15%.           OT Goal 5 (Progressing)       Start:  08/02/24    Expected End:  12/27/24       Improve  strength to 70% of unaffected side for opening jars.           Plan of care was developed with input and agreement by the patient.    Treatments:      Therapeutic Exercise:   20 min  HEP education and completion: MCP flexion stretch, IP flexion stretch,  composite digit flexion stretch, prayer stretch, wrist flexion stretch, and forearm pronation stretch.  Digits positioned into end range digit flexion with coban and use of heating pad to improve tissue extensibility and improve range of motion.   Place and hold into end range digit flexion.   Sponge pickup with hand gripper on light resistance   Grasping with power web and extension stretch.  Wrist extension and flexion with red flexbar.  Forearm pronation and supination with red flexbar.  Digi-flex progression      Manual Therapy    20 min  Therapist performed manual digit, wrist, and elbow range of motion.  Therapist performed A/P joint mobilization combined with MCP flexion.  Therapist positioned MCPs in end range flexion with patient making active fist.  Therapist perform elbow distraction combined with elbow extension.      Assessment:    Continued strengthening exercises. Patient to continue working on end range motion. Patient reports improvement in pain.       Plan:      Planned Interventions include: therapeutic exercise, therapeutic activity, self-care home management, manual therapy, therapeutic activities, gait training, neuromuscular coordination, vasopneumatic, dry needling, aquatic therapy, electric stimulation, fluidotherapy, ultrasound, kinesiotaping, orthosis fabrication, wound care  Frequency: 1-2 x Week  Duration: 12 Weeks      Felipe Lee, OT

## 2025-01-13 ENCOUNTER — TELEPHONE (OUTPATIENT)
Dept: OBSTETRICS AND GYNECOLOGY | Facility: CLINIC | Age: 80
End: 2025-01-13
Payer: MEDICARE

## 2025-01-14 ENCOUNTER — TREATMENT (OUTPATIENT)
Dept: OCCUPATIONAL THERAPY | Facility: HOSPITAL | Age: 80
End: 2025-01-14
Payer: MEDICARE

## 2025-01-14 DIAGNOSIS — S42.411A CLOSED FRACTURE OF SUPRACONDYLAR HUMERUS, RIGHT, INITIAL ENCOUNTER: ICD-10-CM

## 2025-01-14 PROCEDURE — 97110 THERAPEUTIC EXERCISES: CPT | Mod: GO | Performed by: OCCUPATIONAL THERAPIST

## 2025-01-14 PROCEDURE — 97140 MANUAL THERAPY 1/> REGIONS: CPT | Mod: GO | Performed by: OCCUPATIONAL THERAPIST

## 2025-01-14 NOTE — PROGRESS NOTES
Occupational Therapy  Occupational Therapy Orthopedic Treatment    Patient Name: Gertrudis Cottrell  MRN: [id number]  Today's Date: 1/14/2025 1/9/25 - J - 2025 VERIFIED - MEDICARE A/B - NO AUTH / $257 DEDUCT MET / 80% COINS / $0 used PT/ST 2025. [facility]  SUPP - PLAN G / PER RTE  Insurance Type: Payor: MEDICARE / Plan: MEDICARE PART A AND B / Product Type: *No Product type* /   Time:  Time Calculation  Start Time: 0845  Stop Time: 0930  Time Calculation (min): 45 min  OT Therapeutic Procedures Time Entry  Manual Therapy Time Entry: 25  Therapeutic Exercise Time Entry: 20      Insurance Type: Payor: MEDICARE / Plan: MEDICARE PART A AND B / Product Type: *No Product type* /       Insurance:  Visit number: 2 since 2025 , 33 total  General:  Reason for visit: L elbow  Referred by: Hilda Patton PA-C    Current Problem  1. Closed fracture of supracondylar humerus, right, initial encounter  Follow Up In Occupational Therapy        Insurance Type: Payor: MEDICARE / Plan: MEDICARE PART A AND B / Product Type: *No Product type* /     Precautions: per post op protocol     Medical History Form: Reviewed (scanned into chart)    Subjective: My hand is more functional. Pratik has been working on the motion of it.     Chief Complaint: L arm/hand  BASILIO: Fall   DOI: 05/12/2024  DOS: 05/30/2024 ORIF L elbow    Hand Dominance: Right    Current Condition since injury:   better      PAIN     Location: L elbow and hand  Intensity: 1/10  up to 6/10  Description: sharp  Aggravating Factors:  functional use  Relieving Factors:  Rest     Relevant Information (PMH & Previous Tests/Imaging): reviewed in chart     Prior Level of Function (PLOF)  Exercise/Physical Activity: jigsaw puzzles  Work/School: retired  Current ADL/IADL Status: independent with homemaking     Patients Living Environment: Reviewed and no concern    Primary Language: English    Pt goals for therapy: improve functional use of L hand for cooking, cleaning, dressing, jigsaw  puzzles, and various household chores    Red Flags: Do you have any of the following? No  Fever/chills, unexplained weight changes, dizziness/fainting, unexplained change in bowel or bladder functions, unexplained malaise or muscle weakness, night pain/sweats, numbness or tingling    Objective:     Distance from tips to palm -; MCP/PIP/DIP flexion ; achieved 1 cm post treatment for all digits  2nd: 1 cm  ; 65/100/75   - post treatment 75 was 70; 0 cm    3rd: 1.5 cm   ;  65/100/70  - post treatment 75 was 70; 1.5 cm    4th: 2 cm   ;  70/105/40 - post treatment 75; 1.5 cm    5th: 3 cm  ; 55/95/40 - - post treatment 70 was 60; 1.5 cm   Thumb opposition to tip of 4th   Wrist extension/flexion 75 /62   Wrist ulnar/radial deviation 20/10  Forearm pronation/supination 70 /75    Elbow extension/flexion: 23 -115   ;  passive post treatment    strength: 50#/12#  level 3 was 15#  Physical Observation: significant limitations with elbow and digit range of motion, MCPs most limited, extensor habitus posture of hand    Edema: moderate    Sensory: no complaints of sensory issues     Good rehab potential    Outcome Measures:  Quick DASH: 35    EDUCATION: home exercise program, plan of care, activity modifications, pain management, and injury pathology     Goals:  Active       OT Goals       OT Goal 1 (Progressing)       Start:  08/02/24    Expected End:  12/06/24       Achieve tips to palm for grasping objects in hand.         OT Goal 2 (Progressing)       Start:  08/02/24    Expected End:  12/06/24       Improve elbow flexion to 120 degrees for dressing tasks.         OT Goal 3 (Progressing)       Start:  08/02/24    Expected End:  12/27/24       Improve elbow extension to 15 degrees for reaching to grasp objects.         OT Goal 4 (Progressing)       Start:  08/02/24    Expected End:  12/27/24       Improve Quick DASH to 15%.           OT Goal 5 (Progressing)       Start:  08/02/24    Expected End:  12/27/24        Improve  strength to 70% of unaffected side for opening jars.           Plan of care was developed with input and agreement by the patient.    Treatments:      Therapeutic Exercise:   20 min  HEP education and completion: MCP flexion stretch, IP flexion stretch, composite digit flexion stretch, prayer stretch, wrist flexion stretch, and forearm pronation stretch.  Digits positioned into end range digit flexion with coban and use of heating pad to improve tissue extensibility and improve range of motion.   Place and hold into end range digit flexion.   Sponge pickup with hand gripper on light resistance   Sponge pickup with red resistance clip  Grasping with power web and extension stretch.  Digi-flex progression  Bead retrieval into yellow putty.       Manual Therapy    25 min  Therapist performed manual digit, wrist, and elbow range of motion.  Therapist performed A/P joint mobilization combined with MCP flexion.  Therapist positioned MCPs in end range flexion with patient making active fist.  Therapist perform elbow distraction combined with elbow extension.      Assessment:       strength unchanged. Patient to continue working on end range motion. Patient reports improvements with functional use of hand. Patient has MD follow up tomorrow.       Plan:      Planned Interventions include: therapeutic exercise, therapeutic activity, self-care home management, manual therapy, therapeutic activities, gait training, neuromuscular coordination, vasopneumatic, dry needling, aquatic therapy, electric stimulation, fluidotherapy, ultrasound, kinesiotaping, orthosis fabrication, wound care  Frequency: 1-2 x Week  Duration: 12 Weeks      Felipe Lee OT

## 2025-01-15 ENCOUNTER — HOSPITAL ENCOUNTER (OUTPATIENT)
Dept: RADIOLOGY | Facility: HOSPITAL | Age: 80
Discharge: HOME | End: 2025-01-15
Payer: MEDICARE

## 2025-01-15 ENCOUNTER — OFFICE VISIT (OUTPATIENT)
Dept: ORTHOPEDIC SURGERY | Facility: HOSPITAL | Age: 80
End: 2025-01-15
Payer: MEDICARE

## 2025-01-15 VITALS — WEIGHT: 144 LBS | BODY MASS INDEX: 24.59 KG/M2 | HEIGHT: 64 IN

## 2025-01-15 DIAGNOSIS — S54.00XA: ICD-10-CM

## 2025-01-15 DIAGNOSIS — M79.642 HAND PAIN, LEFT: ICD-10-CM

## 2025-01-15 PROCEDURE — G2211 COMPLEX E/M VISIT ADD ON: HCPCS | Performed by: STUDENT IN AN ORGANIZED HEALTH CARE EDUCATION/TRAINING PROGRAM

## 2025-01-15 PROCEDURE — 99214 OFFICE O/P EST MOD 30 MIN: CPT | Performed by: STUDENT IN AN ORGANIZED HEALTH CARE EDUCATION/TRAINING PROGRAM

## 2025-01-15 PROCEDURE — 73130 X-RAY EXAM OF HAND: CPT | Mod: LT

## 2025-01-15 PROCEDURE — 73130 X-RAY EXAM OF HAND: CPT | Mod: LEFT SIDE | Performed by: RADIOLOGY

## 2025-01-15 PROCEDURE — 1159F MED LIST DOCD IN RCRD: CPT | Performed by: STUDENT IN AN ORGANIZED HEALTH CARE EDUCATION/TRAINING PROGRAM

## 2025-01-15 ASSESSMENT — PAIN - FUNCTIONAL ASSESSMENT: PAIN_FUNCTIONAL_ASSESSMENT: NO/DENIES PAIN

## 2025-01-17 ENCOUNTER — TREATMENT (OUTPATIENT)
Dept: OCCUPATIONAL THERAPY | Facility: HOSPITAL | Age: 80
End: 2025-01-17
Payer: MEDICARE

## 2025-01-17 DIAGNOSIS — S42.411A CLOSED FRACTURE OF SUPRACONDYLAR HUMERUS, RIGHT, INITIAL ENCOUNTER: ICD-10-CM

## 2025-01-17 PROCEDURE — 97110 THERAPEUTIC EXERCISES: CPT | Mod: GO | Performed by: OCCUPATIONAL THERAPIST

## 2025-01-17 PROCEDURE — 97140 MANUAL THERAPY 1/> REGIONS: CPT | Mod: GO | Performed by: OCCUPATIONAL THERAPIST

## 2025-01-17 NOTE — PROGRESS NOTES
Occupational Therapy  Occupational Therapy Orthopedic Treatment    Patient Name: Gertrudis Cottrell  MRN: [id number]  Today's Date: 1/17/2025 1/9/25 - J - 2025 VERIFIED - MEDICARE A/B - NO AUTH / $257 DEDUCT MET / 80% COINS / $0 used PT/ST 2025. [employer]  SUPP - PLAN G / PER RTE  Insurance Type: Payor: MEDICARE / Plan: MEDICARE PART A AND B / Product Type: *No Product type* /   Time:   Time:  Time Calculation  Start Time: 0850  Stop Time: 0930  Time Calculation (min): 40 min  OT Therapeutic Procedures Time Entry  Manual Therapy Time Entry: 20  Therapeutic Exercise Time Entry: 20         Insurance:  Visit number: 3 since 2025 , 34 total  General:  Reason for visit: L elbow  Referred by: Hilda Patton PA-C    Current Problem  1. Closed fracture of supracondylar humerus, right, initial encounter  Follow Up In Occupational Therapy        Insurance Type: Payor: MEDICARE / Plan: MEDICARE PART A AND B / Product Type: *No Product type* /     Precautions: per post op protocol     Medical History Form: Reviewed (scanned into chart)    Subjective: I am getting an ultrasound to look at the nerve.    Chief Complaint: L arm/hand  BASILIO: Fall   DOI: 05/12/2024  DOS: 05/30/2024 ORIF L elbow    Hand Dominance: Right    Current Condition since injury:   better      PAIN     Location: L elbow and hand  Intensity: 1/10  up to 6/10  Description: sharp  Aggravating Factors:  functional use  Relieving Factors:  Rest     Relevant Information (PMH & Previous Tests/Imaging): reviewed in chart     Prior Level of Function (PLOF)  Exercise/Physical Activity: jigsaw puzzles  Work/School: retired  Current ADL/IADL Status: independent with homemaking     Patients Living Environment: Reviewed and no concern    Primary Language: English    Pt goals for therapy: improve functional use of L hand for cooking, cleaning, dressing, jigsaw puzzles, and various household chores    Red Flags: Do you have any of the following? No  Fever/chills, unexplained  weight changes, dizziness/fainting, unexplained change in bowel or bladder functions, unexplained malaise or muscle weakness, night pain/sweats, numbness or tingling    Objective:     Distance from tips to palm -; MCP/PIP/DIP flexion ; achieved 1 cm post treatment for all digits  2nd: 1 cm  ; 65/100/75   - post treatment 75 was 70; 0 cm    3rd: 1.5 cm   ;  65/100/70  - post treatment 75 was 70; 1.5 cm    4th: 2 cm   ;  70/105/40 - post treatment 75; 1.5 cm    5th: 3 cm  ; 55/95/40 - - post treatment 70 was 60; 1.5 cm   Thumb opposition to tip of 4th   Wrist extension/flexion 75 /62   Wrist ulnar/radial deviation 20/10  Forearm pronation/supination 70 /75    Elbow extension/flexion: 23 -115   ;  passive post treatment    strength: 50#/12#  level 3 was 15#  Physical Observation: significant limitations with elbow and digit range of motion, MCPs most limited, extensor habitus posture of hand    Edema: moderate    Sensory: no complaints of sensory issues     Good rehab potential    Outcome Measures:  Quick DASH: 35    EDUCATION: home exercise program, plan of care, activity modifications, pain management, and injury pathology     Goals:  Active       OT Goals       OT Goal 1 (Progressing)       Start:  08/02/24    Expected End:  12/06/24       Achieve tips to palm for grasping objects in hand.         OT Goal 2 (Progressing)       Start:  08/02/24    Expected End:  12/06/24       Improve elbow flexion to 120 degrees for dressing tasks.         OT Goal 3 (Progressing)       Start:  08/02/24    Expected End:  12/27/24       Improve elbow extension to 15 degrees for reaching to grasp objects.         OT Goal 4 (Progressing)       Start:  08/02/24    Expected End:  12/27/24       Improve Quick DASH to 15%.           OT Goal 5 (Progressing)       Start:  08/02/24    Expected End:  12/27/24       Improve  strength to 70% of unaffected side for opening jars.           Plan of care was developed with input and  agreement by the patient.    Treatments:      Therapeutic Exercise:   20 min  HEP education and completion: MCP flexion stretch, IP flexion stretch, composite digit flexion stretch, prayer stretch, wrist flexion stretch, and forearm pronation stretch.  Digits positioned into end range digit flexion with coban and use of heating pad to improve tissue extensibility and improve range of motion.   Place and hold into end range digit flexion.   Sponge pickup with hand gripper on light resistance   Sponge pickup with red resistance clip  Grasping with power web and extension stretch.  Digi-flex progression  Bead retrieval into yellow putty.       Manual Therapy    20 min  Therapist performed manual digit, wrist, and elbow range of motion.  Therapist performed A/P joint mobilization combined with MCP flexion.  Therapist positioned MCPs in end range flexion with patient making active fist.      Assessment:      Continued strengthening and end range digit stretches. Patient motivated and compliant with home program.       Plan:      Planned Interventions include: therapeutic exercise, therapeutic activity, self-care home management, manual therapy, therapeutic activities, gait training, neuromuscular coordination, vasopneumatic, dry needling, aquatic therapy, electric stimulation, fluidotherapy, ultrasound, kinesiotaping, orthosis fabrication, wound care  Frequency: 1-2 x Week  Duration: 12 Weeks      Felipe Lee, OT

## 2025-01-21 ENCOUNTER — TREATMENT (OUTPATIENT)
Dept: OCCUPATIONAL THERAPY | Facility: HOSPITAL | Age: 80
End: 2025-01-21
Payer: MEDICARE

## 2025-01-21 DIAGNOSIS — S42.411A CLOSED FRACTURE OF SUPRACONDYLAR HUMERUS, RIGHT, INITIAL ENCOUNTER: ICD-10-CM

## 2025-01-21 PROCEDURE — 97110 THERAPEUTIC EXERCISES: CPT | Mod: GO | Performed by: OCCUPATIONAL THERAPIST

## 2025-01-21 PROCEDURE — 97140 MANUAL THERAPY 1/> REGIONS: CPT | Mod: GO | Performed by: OCCUPATIONAL THERAPIST

## 2025-01-21 NOTE — PROGRESS NOTES
Occupational Therapy  Occupational Therapy Orthopedic Treatment    Patient Name: Gertrudis Cottrell  MRN: [id number]  Today's Date: 1/21/2025 1/9/25 - J - 2025 VERIFIED - MEDICARE A/B - NO AUTH / $257 DEDUCT MET / 80% COINS / $0 used PT/ST 2025. [employer]  SUPP - PLAN G / PER RTE  Insurance Type: Payor: MEDICARE / Plan: MEDICARE PART A AND B / Product Type: *No Product type* /         Time:  Time Calculation  Start Time: 0850  Stop Time: 0930  Time Calculation (min): 40 min  OT Therapeutic Procedures Time Entry  Manual Therapy Time Entry: 20  Therapeutic Exercise Time Entry: 20          Insurance:  Visit number: 4 since 2025 , 35 total  General:  Reason for visit: L elbow  Referred by: Hilda Patton PA-C    Current Problem  1. Closed fracture of supracondylar humerus, right, initial encounter  Follow Up In Occupational Therapy        Insurance Type: Payor: MEDICARE / Plan: MEDICARE PART A AND B / Product Type: *No Product type* /     Precautions: per post op protocol     Medical History Form: Reviewed (scanned into chart)    Subjective: I haven't had any pain.     Chief Complaint: L arm/hand  BASILIO: Fall   DOI: 05/12/2024  DOS: 05/30/2024 ORIF L elbow    Hand Dominance: Right    Current Condition since injury:   better      PAIN     Location: L elbow and hand  Intensity: 0/10   Description: sharp  Aggravating Factors:  functional use  Relieving Factors:  Rest     Relevant Information (PMH & Previous Tests/Imaging): reviewed in chart     Prior Level of Function (PLOF)  Exercise/Physical Activity: jigsaw puzzles  Work/School: retired  Current ADL/IADL Status: independent with homemaking     Patients Living Environment: Reviewed and no concern    Primary Language: English    Pt goals for therapy: improve functional use of L hand for cooking, cleaning, dressing, jigsaw puzzles, and various household chores    Red Flags: Do you have any of the following? No  Fever/chills, unexplained weight changes, dizziness/fainting,  unexplained change in bowel or bladder functions, unexplained malaise or muscle weakness, night pain/sweats, numbness or tingling    Objective:     Distance from tips to palm -; MCP/PIP/DIP flexion ; achieved 1 cm post treatment for all digits  2nd: 1 cm  ; 65/100/75   - post treatment 75 was 70; 0 cm    3rd: 1.5 cm   ;  65/100/70  - post treatment 75 was 70; 1.5 cm    4th: 2 cm   ;  70/105/40 - post treatment 75; 1.5 cm    5th: 3 cm  ; 55/95/40 - - post treatment 70 was 60; 1.5 cm   Thumb opposition to tip of 4th   Wrist extension/flexion 75 /62   Wrist ulnar/radial deviation 20/10  Forearm pronation/supination 70 /75    Elbow extension/flexion: 23 -115   ;  passive post treatment    strength: 50#/12#  level 3 was 15#  Physical Observation: significant limitations with elbow and digit range of motion, MCPs most limited, extensor habitus posture of hand    Edema: moderate    Sensory: no complaints of sensory issues     Good rehab potential    Outcome Measures:  Quick DASH: 35    EDUCATION: home exercise program, plan of care, activity modifications, pain management, and injury pathology     Goals:  Active       OT Goals       OT Goal 1 (Progressing)       Start:  08/02/24    Expected End:  12/06/24       Achieve tips to palm for grasping objects in hand.         OT Goal 2 (Progressing)       Start:  08/02/24    Expected End:  12/06/24       Improve elbow flexion to 120 degrees for dressing tasks.         OT Goal 3 (Progressing)       Start:  08/02/24    Expected End:  12/27/24       Improve elbow extension to 15 degrees for reaching to grasp objects.         OT Goal 4 (Progressing)       Start:  08/02/24    Expected End:  12/27/24       Improve Quick DASH to 15%.           OT Goal 5 (Progressing)       Start:  08/02/24    Expected End:  12/27/24       Improve  strength to 70% of unaffected side for opening jars.           Plan of care was developed with input and agreement by the  patient.    Treatments:      Therapeutic Exercise:   20 min  HEP education and completion: MCP flexion stretch, IP flexion stretch, composite digit flexion stretch, prayer stretch, wrist flexion stretch, and forearm pronation stretch.  Digits positioned into end range digit flexion with coban and use of heating pad to improve tissue extensibility and improve range of motion.   Place and hold into end range digit flexion.   Sponge pickup with hand gripper on light resistance   Sponge pickup with red resistance clip  Grasping with power web and extension stretch.  Putty tools.       Manual Therapy    20 min  Therapist performed manual digit, wrist, and elbow range of motion.  Therapist performed A/P joint mobilization combined with MCP flexion.  Therapist positioned MCPs in end range flexion with patient making active fist.      Assessment:      Patient to continue working on end range motion. Patient to follow up Friday.         Plan:      Planned Interventions include: therapeutic exercise, therapeutic activity, self-care home management, manual therapy, therapeutic activities, gait training, neuromuscular coordination, vasopneumatic, dry needling, aquatic therapy, electric stimulation, fluidotherapy, ultrasound, kinesiotaping, orthosis fabrication, wound care  Frequency: 1-2 x Week  Duration: 12 Weeks      Felipe Lee, OT

## 2025-01-24 ENCOUNTER — TREATMENT (OUTPATIENT)
Dept: OCCUPATIONAL THERAPY | Facility: HOSPITAL | Age: 80
End: 2025-01-24
Payer: MEDICARE

## 2025-01-24 DIAGNOSIS — S42.411A CLOSED FRACTURE OF SUPRACONDYLAR HUMERUS, RIGHT, INITIAL ENCOUNTER: ICD-10-CM

## 2025-01-24 PROCEDURE — 97140 MANUAL THERAPY 1/> REGIONS: CPT | Mod: GO | Performed by: OCCUPATIONAL THERAPIST

## 2025-01-24 PROCEDURE — 97110 THERAPEUTIC EXERCISES: CPT | Mod: GO | Performed by: OCCUPATIONAL THERAPIST

## 2025-01-24 NOTE — PROGRESS NOTES
Occupational Therapy  Occupational Therapy Orthopedic Treatment    Patient Name: Gertrudis Cottrell  MRN: [id number]  Today's Date: 1/24/2025 1/9/25 - J - 2025 VERIFIED - MEDICARE A/B - NO AUTH / $257 DEDUCT MET / 80% COINS / $0 used PT/ST 2025. [employer]  SUPP - PLAN G / PER RTE  Insurance Type: Payor: MEDICARE / Plan: MEDICARE PART A AND B / Product Type: *No Product type* /   Time:  Time Calculation  Start Time: 0845  Stop Time: 0930  Time Calculation (min): 45 min  OT Therapeutic Procedures Time Entry  Manual Therapy Time Entry: 20  Therapeutic Exercise Time Entry: 25      Insurance:  Visit number:  since 2025 , 36 total  General:  Reason for visit: L elbow  Referred by: Hilda Patton PA-C    Current Problem  1. Closed fracture of supracondylar humerus, right, initial encounter  Follow Up In Occupational Therapy        Insurance Type: Payor: MEDICARE / Plan: MEDICARE PART A AND B / Product Type: *No Product type* /     Precautions: per post op protocol     Medical History Form: Reviewed (scanned into chart)    Subjective: I have the ultrasound next week and see the doctor on Friday. My elbow has been sore.    Chief Complaint: L arm/hand  BASILIO: Fall   DOI: 05/12/2024  DOS: 05/30/2024 ORIF L elbow    Hand Dominance: Right    Current Condition since injury:   better      PAIN     Location: L elbow and hand  Intensity: 0/10   Description: sharp  Aggravating Factors:  functional use  Relieving Factors:  Rest     Relevant Information (PMH & Previous Tests/Imaging): reviewed in chart     Prior Level of Function (PLOF)  Exercise/Physical Activity: jigsaw puzzles  Work/School: retired  Current ADL/IADL Status: independent with homemaking     Patients Living Environment: Reviewed and no concern    Primary Language: English    Pt goals for therapy: improve functional use of L hand for cooking, cleaning, dressing, jigsaw puzzles, and various household chores    Red Flags: Do you have any of the following? No  Fever/chills,  unexplained weight changes, dizziness/fainting, unexplained change in bowel or bladder functions, unexplained malaise or muscle weakness, night pain/sweats, numbness or tingling    Objective:     Distance from tips to palm -; MCP/PIP/DIP flexion ; achieved 1 cm post treatment for all digits  2nd: 1 cm  ; 65/100/75   - post treatment 75 was 70; 0 cm    3rd: 1.5 cm   ;  65/100/70  - post treatment 75 was 70; 1.5 cm    4th: 2 cm   ;  70/105/40 - post treatment 75; 1.5 cm    5th: 3 cm  ; 55/95/40 - - post treatment 70 was 60; 1.5 cm   Thumb opposition to tip of 4th   Wrist extension/flexion 75 /62   Wrist ulnar/radial deviation 20/10  Forearm pronation/supination 70 /75    Elbow extension/flexion: 23 -115   ;  passive post treatment    strength: 50#/12#  level 3 was 15#  Physical Observation: significant limitations with elbow and digit range of motion, MCPs most limited, extensor habitus posture of hand    Edema: moderate    Sensory: no complaints of sensory issues     Good rehab potential    Outcome Measures:  Quick DASH: 35    EDUCATION: home exercise program, plan of care, activity modifications, pain management, and injury pathology     Goals:  Active       OT Goals       OT Goal 1 (Progressing)       Start:  08/02/24    Expected End:  12/06/24       Achieve tips to palm for grasping objects in hand.         OT Goal 2 (Progressing)       Start:  08/02/24    Expected End:  12/06/24       Improve elbow flexion to 120 degrees for dressing tasks.         OT Goal 3 (Progressing)       Start:  08/02/24    Expected End:  12/27/24       Improve elbow extension to 15 degrees for reaching to grasp objects.         OT Goal 4 (Progressing)       Start:  08/02/24    Expected End:  12/27/24       Improve Quick DASH to 15%.           OT Goal 5 (Progressing)       Start:  08/02/24    Expected End:  12/27/24       Improve  strength to 70% of unaffected side for opening jars.           Plan of care was developed with  input and agreement by the patient.    Treatments:      Therapeutic Exercise:   25 min  HEP education and completion: MCP flexion stretch, IP flexion stretch, composite digit flexion stretch, prayer stretch, wrist flexion stretch, and forearm pronation stretch.  Digits positioned into end range digit flexion with coban and use of heating pad to improve tissue extensibility and improve range of motion.   Place and hold into end range digit flexion.   Sponge pickup with hand gripper on light resistance   Sponge pickup with red resistance clip  Grasping with power web and extension stretch.  Putty tools.   Forearm pronation and supination with 2#.      Manual Therapy    20 min  Therapist performed manual digit, wrist, and elbow range of motion.  Therapist performed A/P joint mobilization combined with MCP flexion.  Therapist positioned MCPs in end range flexion with patient making active fist.      Assessment:      Continued range of motion. Added forearm strengthening with weight. Patient scheduled for ultrasound next week. Patient to follow up in two weeks.    Plan:      Planned Interventions include: therapeutic exercise, therapeutic activity, self-care home management, manual therapy, therapeutic activities, gait training, neuromuscular coordination, vasopneumatic, dry needling, aquatic therapy, electric stimulation, fluidotherapy, ultrasound, kinesiotaping, orthosis fabrication, wound care  Frequency: 1-2 x Week  Duration: 12 Weeks      Felipe Lee, OT

## 2025-01-28 ENCOUNTER — APPOINTMENT (OUTPATIENT)
Dept: OCCUPATIONAL THERAPY | Facility: HOSPITAL | Age: 80
End: 2025-01-28
Payer: MEDICARE

## 2025-01-28 ENCOUNTER — PROCEDURE VISIT (OUTPATIENT)
Dept: NEUROLOGY | Facility: HOSPITAL | Age: 80
End: 2025-01-28
Payer: MEDICARE

## 2025-01-28 DIAGNOSIS — G56.22 ULNAR NEUROPATHY AT ELBOW, LEFT: Primary | ICD-10-CM

## 2025-01-28 DIAGNOSIS — S44.02XD INJURY OF LEFT ULNAR NERVE AT UPPER ARM LEVEL, SUBSEQUENT ENCOUNTER: ICD-10-CM

## 2025-01-28 PROCEDURE — 76883 US NRV&ACC STRUX 1XTR COMPRE: CPT | Performed by: PSYCHIATRY & NEUROLOGY

## 2025-01-28 NOTE — PROGRESS NOTES
.NEUROMUSCULAR ULTRASOUND OF THE LEFT UPPER EXTREMITY    INDICATION:   Clinical Information: She fractured her left distal humerus in May 2024 and underwent ORIF surgery followed by casting. Post-surgery, she experienced tingling and a worm-like sensation in her left hand and fingers, which has resolved. However, she continues to have discomfort in the left hand, primarily in the ring and little fingers, with weakness and partial clawing. She also has a history of poliomyelitis at age 5, primarily affecting the left leg. 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 Current study is to evaluate for left ulnar neuropathy.    Neuromuscular ultrasound to be performed:  (a) to evaluate the echotexture and size of the left ulnar nerve throughout its course in the limb;   (b) to assess for any identifiable mechanical source of ulnar nerve compression;   (c) to identify any dynamic source of neuropathy from nerve subluxation or dislocation;  (d) to assess adjacent structures around the left elbow for abnormalities     HEIGHT: 5 ft 4 in  WEIGHT: 138 lbs.  HANDEDNESS: Right    COMPARISON:   None.    TECHNIQUE:  The left ulnar nerve and accompanying structures were studied throughout their entire anatomic course in the limb from the wrist to the axilla, including real-time cine imaging. The study was performed using a [employer] P9 ultrasound machine with a 15-6 MHz matrix linear transducer. Cross sectional area (CSA) was measured within the epineurium, using the trace method. At locations where repeat measurements were taken, the mean value is reported below. Transverse and longitudinal images were obtained. For comparison purposes, the left median nerve was first examined at the distal wrist crease and in the mid-forearm, both in transverse and longitudinal views. For the median nerve, the patient was examined supine with the arm extended and supinated. For the ulnar nerve, the patient was  placed supine with the arms externally rotated, abducted, and slightly flexed at the elbow. With the elbow extended, the transducer was placed at the level of the medial epicondyle as the patient´s elbow was passively flexed to determine if either ulnar nerve subluxed or dislocated out of the groove.    FINDINGS:    Left median nerve:    At the left wrist at the distal wrist crease (proximal carpal tunnel), the median nerve CSA was  7.1 mm2 (NL < 10 mm2; borderline 10-13 mm2; ABN > 13 mm2).     The echogenicity of the median nerve was normal. The mobility of the median nerve with flexion and extension of the fingers was normal. Color Doppler of the median nerve showed normal median nerve vascularity. No abnormal tenosynovitis of the flexor tendons was noted.    Using a longitudinal scan of the left median nerve at the wrist, a notch sign was not seen under the flexor retinaculum. There was a mild synovial hypertrophy of the radiocarpal joint.    A persistent median artery was not present. A bifid median nerve was not present. No other abnormal mass or ganglia was appreciated in the carpal tunnel. With extension of the fingers, there was no abnormal intrusion of the flexor digitorum sublimis. With flexion of the fingers, there was no abnormal intrusion of the lumbrical muscles.    In the mid-forearm, approximately 12 cm proximal to the distal wrist crease, the median nerve was seen between the flexor digitorum sublimis and flexor digitorum profundus muscles. At the mid-forearm, the median nerve CSA was 6.3 mm2. The ratio of the median CSAs between the wrist and forearm (WFR) was 1.1 (NL < 1.5; borderline: 1.5 - 2.0). The echogenicity of the median nerve in the forearm was normal.    The median nerve was then followed proximal into the forearm to the antecubital fossa. The median nerve was normal in size and echogenicity adjacent to the brachial artery.     Scanning the muscles, the echogenicity was normal of the  abductor pollicis brevis, flexor digitorum sublimis, flexor digitorum profundus, flexor carpi radialis, and pronator teres. There was moderately increased echogenicity and atrophy of the median-flexor pollicis brevis and flexor pollicis longus muscles and significantly increased echogenicity and atrophy of the pronator quadratus muscles.     Left ulnar nerve:    Attention was then turned to the ulnar nerve. At the wrist, the ulnar CSA was 8.1 mm2 (NL < 10 mm2). The echogenicity was normal.    At the mid-forearm slightly proximal to the location where the ulnar artery  from the ulnar nerve, the CSA was 10.0 mm2 (NL < 10 mm2). The echogenicity was normal.    At the level of cubital tunnel, the ulnar nerve with the largest CSA was located in between the two heads of the flexor carpi ulnaris. The CSA at this location was 7.7 mm2 (NL < 10 mm2; mild ABN 10-15 mm2; moderate ABN 15-20 mm2; severely ABN > 20 mm2). The echogenicity was normal.    At the level of retrocondylar groove, the ulnar nerve with the largest CSA was located between the medial epicondyle and olecranon. The CSA at this location was 8.4 mm2 (NL < 10 mm2; mild ABN 10-15 mm2; moderate ABN 15-20 mm2; severely ABN > 20 mm2). The echogenicity was reduced.    Using longitudinal scan, the ulnar nerve was focally enlarged at the distal humerus proximal to the retrocondylar groove.    The ulnar nerve lines adjacent to 2 screws at the medial epicondyle. No screw impingement on the ulnar nerve was observed. The ulnar nerve was in continuity, with no evidence of neuroma or laceration.    Color Doppler of the ulnar nerve at the elbow showed normal nerve vascularity. No abnormal mass was appreciated affecting the ulnar nerve in the elbow. An anconeus epitrochlearis muscle was not appreciated at the elbow.     Significant sclerotic changes, osteophytes and arthritic changes were observed at the elbow joint, particularly at the medial epicondyle.    At the  mid-arm under the fascia of the medial triceps, the CSA was 8.3 mm2 (NL < 10 mm2). The echogenicity was normal. The ulnar nerve was the followed to the axilla and was normal.    The ratio of the largest CSAs between the elbow and mid-forearm was 0.8 (NL < 1.5).    The ratio of the largest CSAs between the elbow and mid-arm was 1.0 (NL < 1.5).    Scanning the ulnar muscles, the echogenicity was significant increased echogenicity and atrophy in the deep head of the flexor pollicis brevis, medial flexor digitorum profundus and flexor carpi ulnaris.    There was widening of the radiocapitellar and ulnar trochlear joints, with increased joint effusion. The hyaline cartilage was not clearly visualized. Longitudinal imaging showed normal fat pad location in the coronoid fossa, with no abnormal effusion. The radial fossa showed mild sclerotic changes and some effusion. The triceps tendon inserted into the olecranon, which showed screw shadowing, osteophyte formation, and arthritic changes. The olecranon fossa also demonstrated effusion. The posterior elbow joint also showed widening and increased effusion.    IMPRESSION:    This is an abnormal and complex neuromuscular ultrasound examination of the left upper extremity.     There was ultrasound evidence of a mild to moderate ulnar neuropathy at the elbow. The location of the lesion was at the distal humerus proximal to retrocondylar groove. The ulnar nerve are immediately adjacent to 2 screws at the medial epicondyle. However, no definite screw or other hardware impingement on the ulnar nerve was observed. The ulnar nerve was in continuity, with no evidence of neuroma or laceration. The ulnar nerve was followed through its anatomic course in the limb from wrist to axilla and was otherwise normal.    There is an denervation atrophy of the ulnar innervated muscles including deep head of the flexor pollicis brevis, medial flexor digitorum profundus and flexor carpi  ulnaris.    In addition there was prominent denervation atrophy of the pronator quadratus muscle.  However this was not accompanied by denervation atrophy of other muscles supplied by the anterior interosseous nerve and may represent a sequelae of prior trauma.    The left elbow joint showed significant degenerative changes, including sclerotic changes, osteophytes, particularly at the medial epicondyle. There is joint widening and increased effusion in the radiocapitellar, ulnar trochlear, and posterior elbow joints. Mild sclerotic changes with effusion was seen in the radial fossa. The olecranon showed screw shadowing, osteophytes, and arthritic changes, with effusion in the olecranon fossa. These findings are consistent with the patient’s history of a distal humerus fracture and prior surgery.    For comparison purposes and to assess for additional pathology, the median nerve was also studied and was normal. There was a mild synovial hypertrophy of the radiocarpal joint.    The other visualized osseous, ligamentous and tendinous structures appeared normal.      Performed by: David Hernandez MD  Authorized by: David Hernandez MD

## 2025-01-31 ENCOUNTER — OFFICE VISIT (OUTPATIENT)
Dept: ORTHOPEDIC SURGERY | Facility: CLINIC | Age: 80
End: 2025-01-31
Payer: MEDICARE

## 2025-01-31 ENCOUNTER — APPOINTMENT (OUTPATIENT)
Dept: OCCUPATIONAL THERAPY | Facility: HOSPITAL | Age: 80
End: 2025-01-31
Payer: MEDICARE

## 2025-01-31 VITALS — HEIGHT: 64 IN | WEIGHT: 144 LBS | BODY MASS INDEX: 24.59 KG/M2

## 2025-01-31 DIAGNOSIS — S54.00XA: Primary | ICD-10-CM

## 2025-01-31 PROCEDURE — 99214 OFFICE O/P EST MOD 30 MIN: CPT | Performed by: STUDENT IN AN ORGANIZED HEALTH CARE EDUCATION/TRAINING PROGRAM

## 2025-01-31 PROCEDURE — G2211 COMPLEX E/M VISIT ADD ON: HCPCS | Performed by: STUDENT IN AN ORGANIZED HEALTH CARE EDUCATION/TRAINING PROGRAM

## 2025-01-31 PROCEDURE — 1159F MED LIST DOCD IN RCRD: CPT | Performed by: STUDENT IN AN ORGANIZED HEALTH CARE EDUCATION/TRAINING PROGRAM

## 2025-02-04 ENCOUNTER — TREATMENT (OUTPATIENT)
Dept: OCCUPATIONAL THERAPY | Facility: HOSPITAL | Age: 80
End: 2025-02-04
Payer: MEDICARE

## 2025-02-04 DIAGNOSIS — S42.411A CLOSED FRACTURE OF SUPRACONDYLAR HUMERUS, RIGHT, INITIAL ENCOUNTER: ICD-10-CM

## 2025-02-04 PROCEDURE — 97140 MANUAL THERAPY 1/> REGIONS: CPT | Mod: GO | Performed by: OCCUPATIONAL THERAPIST

## 2025-02-04 PROCEDURE — 97110 THERAPEUTIC EXERCISES: CPT | Mod: GO | Performed by: OCCUPATIONAL THERAPIST

## 2025-02-04 NOTE — PROGRESS NOTES
Occupational Therapy  Occupational Therapy Orthopedic Treatment    Patient Name: Gertrudis Cottrell  MRN: [id number]  Today's Date: 2/4/2025 1/9/25 - J - 2025 VERIFIED - MEDICARE A/B - NO AUTH / $257 DEDUCT MET / 80% COINS / $0 used PT/ST 2025. [employer]  SUPP - PLAN G / PER RTE  Insurance Type: Payor: MEDICARE / Plan: MEDICARE PART A AND B / Product Type: *No Product type* /   Time:     Time:  Time Calculation  Start Time: 0840  Stop Time: 0925  Time Calculation (min): 45 min  OT Therapeutic Procedures Time Entry  Manual Therapy Time Entry: 20  Therapeutic Exercise Time Entry: 25         Insurance:  Visit number: 6  since 2025 , 37 total  General:  Reason for visit: L elbow  Referred by: Hilda Patton PA-C    Current Problem  1. Closed fracture of supracondylar humerus, right, initial encounter  Follow Up In Occupational Therapy        Insurance Type: Payor: MEDICARE / Plan: MEDICARE PART A AND B / Product Type: *No Product type* /     Precautions: per post op protocol     Medical History Form: Reviewed (scanned into chart)    Subjective: My fingers are feeling less stiff.    Chief Complaint: L arm/hand  BASILIO: Fall   DOI: 05/12/2024  DOS: 05/30/2024 ORIF L elbow    Hand Dominance: Right    Current Condition since injury:   better      PAIN     Location: L elbow and hand  Intensity: 0/10   Description: sharp  Aggravating Factors:  functional use  Relieving Factors:  Rest     Relevant Information (PMH & Previous Tests/Imaging): reviewed in chart     Prior Level of Function (PLOF)  Exercise/Physical Activity: jigsaw puzzles  Work/School: retired  Current ADL/IADL Status: independent with homemaking     Patients Living Environment: Reviewed and no concern    Primary Language: English    Pt goals for therapy: improve functional use of L hand for cooking, cleaning, dressing, jigsaw puzzles, and various household chores    Red Flags: Do you have any of the following? No  Fever/chills, unexplained weight changes,  dizziness/fainting, unexplained change in bowel or bladder functions, unexplained malaise or muscle weakness, night pain/sweats, numbness or tingling    Objective:     Distance from tips to palm -; MCP/PIP/DIP flexion ; achieved 1 cm post treatment for all digits  2nd: 1 cm  ; 65/100/75   - post treatment 75 was 70; 0 cm    3rd: 1.5 cm   ;  65/100/70  - post treatment 75 was 70; 1.5 cm    4th: 1.5 cm was 2 cm   ;  70/105/40 - post treatment 75; 1.5 cm    5th: 2 cm was 3 cm  ; 55/95/40 - - post treatment 70 was 60; 1.5 cm   Thumb opposition to tip of 4th   Wrist extension/flexion 75 /62   Wrist ulnar/radial deviation 20/10  Forearm pronation/supination 70 /75    Elbow extension/flexion: 23 -115   ;  passive post treatment    strength: 50#/12#  level 3 was 15#  Physical Observation: significant limitations with elbow and digit range of motion, MCPs most limited, extensor habitus posture of hand    Edema: moderate    Sensory: no complaints of sensory issues     Good rehab potential    Outcome Measures:  Quick DASH: 35    EDUCATION: home exercise program, plan of care, activity modifications, pain management, and injury pathology     Goals:  Active       OT Goals       OT Goal 1 (Progressing)       Start:  08/02/24    Expected End:  12/06/24       Achieve tips to palm for grasping objects in hand.         OT Goal 2 (Progressing)       Start:  08/02/24    Expected End:  12/06/24       Improve elbow flexion to 120 degrees for dressing tasks.         OT Goal 3 (Progressing)       Start:  08/02/24    Expected End:  12/27/24       Improve elbow extension to 15 degrees for reaching to grasp objects.         OT Goal 4 (Progressing)       Start:  08/02/24    Expected End:  12/27/24       Improve Quick DASH to 15%.           OT Goal 5 (Progressing)       Start:  08/02/24    Expected End:  12/27/24       Improve  strength to 70% of unaffected side for opening jars.           Plan of care was developed with input and  agreement by the patient.    Treatments:      Therapeutic Exercise:   25 min  HEP education and completion: MCP flexion stretch, IP flexion stretch, composite digit flexion stretch, prayer stretch, wrist flexion stretch, and forearm pronation stretch.  Digits positioned into end range digit flexion (intrinsic minus) with coban and use of heating pad to improve tissue extensibility and improve range of motion.   Place and hold into end range digit flexion.   Sponge pickup with hand gripper on light resistance - did not complete today  Sponge pickup with red resistance clip - did not complete today  Grasping with power web and extension stretch. - did not complete today  Therapist remolded orthosis.       Manual Therapy    20 min  Therapist performed manual digit, wrist, and elbow range of motion.  Therapist performed A/P joint mobilization combined with MCP flexion.  Therapist positioned MCPs in end range flexion with patient making active fist.      Assessment:    Digit motion improved today. Patient reports good compliance with home program. Remolded orthosis today.       Plan:      Planned Interventions include: therapeutic exercise, therapeutic activity, self-care home management, manual therapy, therapeutic activities, gait training, neuromuscular coordination, vasopneumatic, dry needling, aquatic therapy, electric stimulation, fluidotherapy, ultrasound, kinesiotaping, orthosis fabrication, wound care  Frequency: 1-2 x Week  Duration: 12 Weeks      Felipe Lee OT

## 2025-02-06 ENCOUNTER — APPOINTMENT (OUTPATIENT)
Dept: OBSTETRICS AND GYNECOLOGY | Facility: CLINIC | Age: 80
End: 2025-02-06
Payer: MEDICARE

## 2025-02-07 ENCOUNTER — TREATMENT (OUTPATIENT)
Dept: OCCUPATIONAL THERAPY | Facility: HOSPITAL | Age: 80
End: 2025-02-07
Payer: MEDICARE

## 2025-02-07 ENCOUNTER — APPOINTMENT (OUTPATIENT)
Dept: PRIMARY CARE | Facility: CLINIC | Age: 80
End: 2025-02-07
Payer: MEDICARE

## 2025-02-07 DIAGNOSIS — Z00.00 HEALTH CARE MAINTENANCE: ICD-10-CM

## 2025-02-07 DIAGNOSIS — S42.411A CLOSED FRACTURE OF SUPRACONDYLAR HUMERUS, RIGHT, INITIAL ENCOUNTER: ICD-10-CM

## 2025-02-07 DIAGNOSIS — E53.8 B12 DEFICIENCY: Primary | ICD-10-CM

## 2025-02-07 PROCEDURE — 97140 MANUAL THERAPY 1/> REGIONS: CPT | Mod: GO | Performed by: OCCUPATIONAL THERAPIST

## 2025-02-07 PROCEDURE — 97110 THERAPEUTIC EXERCISES: CPT | Mod: GO | Performed by: OCCUPATIONAL THERAPIST

## 2025-02-07 PROCEDURE — 96372 THER/PROPH/DIAG INJ SC/IM: CPT | Performed by: INTERNAL MEDICINE

## 2025-02-07 RX ORDER — CYANOCOBALAMIN 1000 UG/ML
1000 INJECTION, SOLUTION INTRAMUSCULAR; SUBCUTANEOUS ONCE
Status: COMPLETED | OUTPATIENT
Start: 2025-02-07 | End: 2025-02-07

## 2025-02-07 RX ADMIN — CYANOCOBALAMIN 1000 MCG: 1000 INJECTION, SOLUTION INTRAMUSCULAR; SUBCUTANEOUS at 13:17

## 2025-02-07 NOTE — PROGRESS NOTES
CHIEF COMPLAINT: Left ulnar nerve sxs   DOI:5/16/24  DOS:  ORIF L Distal humerus fx by Dr. Oates on 5/30/24      HISTORY OF PRESENT ILLNESS    This is a very pleasant 79-year-old female, right-hand-dominant, retired but very active and highly functional lives with her , denies active tobacco use denies diabetes denies anticoagulation use.  Denies significant cardiopulmonary past medical history.  She is presenting as a new patient evaluation for left-sided ulnar nerve symptoms.  She unfortunately sustained a left distal humerus fracture on the above date and was treated with an ORIF by Dr. Oates.    She has done well from her fixation and has regained a lot of her elbow motion however she has struggled with ulnar neuritis after surgery.  She was initially on gabapentin however has weaned off.  She is very happy with her progress that she has made with occupational therapy.  While her strength is not normal she is thrilled with the trend towards improvement.  Her abnormal sensory disturbances are also improving in both severity and frequency.    PHYSICAL EXAM    Extremities / Musculoskeletal:                  Left upper extremity:  Well-healed surgical wounds.  No gross deformity.  No active skin lesions.  Elbow flexion extension approximately 30-90 near full pronosupination.  No obvious signs of atrophy in the forearm.  Slight narrowing of the palmar width.  Sensation intact but slightly diminished at the ulnar nerve distribution.  Sensation intact and normal with the median radial nerve distribution.  Modest ulnar clawing.  Very weak but intact digital abduction.  Positive Jeansonne positive Froment's sign.  4- out of 5 FDP to the small finger 4- out of 5 first dorsal interosseous.  Motor intact radial PIN median AIN.  2+ radial warm well-perfused    IMAGING / LABS / EMGs:    Left hand x-ray series obtained today and independent reviewed demonstrating no signs of acute fracture or dislocation.  Normal  global alignment.    EMG from 2025 demonstrating electrophysiologic evidence consistent with a nonlocalizable left ulnar neuropathy with active denervation in the ulnar innervated muscles.  Low ulnar sensory potential amplitude absent dorsal ulnar sensory response and active denervation noted in the flexor digitorum profundus which implicated lesion at approximately takeoff of the FDP.    Past Medical History:   Diagnosis Date    Anemia     Hyperlipidemia     Hypertension     Hypothyroidism     PONV (postoperative nausea and vomiting)        Medication Documentation Review Audit       Reviewed by Shirin Saul LPN (Licensed Nurse) on 25 at 0849      Medication Order Taking? Sig Documenting Provider Last Dose Status   atorvastatin (Lipitor) 10 mg tablet [id number]  TAKE 1 TABLET (10 MG) BY MOUTH ONCE DAILY AT BEDTIME. Ezequiel Briseno MD  Active   calcium carbonate/vitamin D3 (CALCIUM + D ORAL) [id number] No Take by mouth. Historical Provider, MD Past Week Active   cyanocobalamin (Vitamin B-12) 1,000 mcg/mL injection [id number]  INJECT 1ML IN THE MUSCLE ONCE A MONTH Ezequiel Briseno MD  Active   cyanocobalamin (Vitamin B-12) injection 1,000 mcg [id number]   Ezequiel Briseno MD  Active   cyanocobalamin (Vitamin B-12) injection 1,000 mcg [id number]   Ezequiel Briseno MD  Active   cyanocobalamin (Vitamin B-12) injection 1,000 mcg [id number]   Ezequiel Briseno MD  Active   cyanocobalamin (Vitamin B-12) injection 1,000 mcg [id number]   Ezequiel Briseno MD  Active   gabapentin (Neurontin) 800 mg tablet [id number]  Take 1 tablet (800 mg) by mouth 3 times a day. Hilda Patton PA-C   24 2359   HYDROcodone-acetaminophen (Norco) 5-325 mg tablet [id number]  Take 1 tablet by mouth every 6 hours if needed for severe pain (7 - 10). Hilda Patton PA-C  Active   levothyroxine (Synthroid, Levoxyl) 100 mcg tablet [id number]  TAKE 1 TABLET (100 MCG) BY MOUTH ONCE DAILY IN THE MORNING. TAKE BEFORE MEALS. Ezequiel CLARK  MD Finn  Active   losartan (Cozaar) 25 mg tablet [id number]  TAKE 1 TABLET BY MOUTH EVERY DAY Ezequiel Briseno MD  Active                    Allergies   Allergen Reactions    Ciprofloxacin Diarrhea       Past Surgical History:   Procedure Laterality Date    BREAST BIOPSY  1980s    HYSTERECTOMY  1997         ASSESSMENT:   Ulnar palsy and neuritis following distal humerus fracture treated with ORIF.    We do long discussion regarding my role in this situation.  Her ulnar nerve is certainly affected with weakness and sensory disturbances.  However it should not be lost that she has made phenomenal progress with occupational therapy and is overall very content with her progress.  I think in addition to the electrodiagnostic studies it would be helpful to obtain a neuromuscular ultrasound to evaluate the ulnar nerve through his entire course looking for signs of stenosis or prestenotic dilation and perhaps any signs of injury.      PLAN:   Continue OT  Neuromuscular ultrasound order placed    Follow-up in: After completion of the ultrasound  XR at next visit: None    The diagnosis and treatment plan were reviewed with the patient. All questions were answered. The patient verbalized understanding of the treatment plan. There were no barriers to understanding identified.     Note dictated with [employer] software.  Completed without full type editing and sent to avoid delay.    Gurvinder Wright M.D.    Department of Orthopaedics  Hand/Upper Extremity  Phone: [phone]  Appt. Phone: [phone]\

## 2025-02-07 NOTE — PROGRESS NOTES
Occupational Therapy  Occupational Therapy Orthopedic Treatment    Patient Name: Gertrudis Cottrell  MRN: [id number]  Today's Date: 2/7/2025 1/9/25 - J - 2025 VERIFIED - MEDICARE A/B - NO AUTH / $257 DEDUCT MET / 80% COINS / $0 used PT/ST 2025. [employer]  SUPP - PLAN G / PER RTE  Insurance Type: Payor: MEDICARE / Plan: MEDICARE PART A AND B / Product Type: *No Product type* /   Time:     Time:  Time Calculation  Start Time: 0835  Stop Time: 0920  Time Calculation (min): 45 min  OT Therapeutic Procedures Time Entry  Manual Therapy Time Entry: 20  Therapeutic Exercise Time Entry: 25         Insurance:  Visit number: 7  since 2025   General:  Reason for visit: L elbow  Referred by: Hilda Patton PA-C    Current Problem  1. Closed fracture of supracondylar humerus, right, initial encounter  Follow Up In Occupational Therapy        Insurance Type: Payor: MEDICARE / Plan: MEDICARE PART A AND B / Product Type: *No Product type* /     Precautions: per post op protocol     Medical History Form: Reviewed (scanned into chart)    Subjective: My fingers are feeling less stiff.    Chief Complaint: L arm/hand  BASILIO: Fall   DOI: 05/12/2024  DOS: 05/30/2024 ORIF L elbow    Hand Dominance: Right    Current Condition since injury:   better      PAIN     Location: L elbow and hand  Intensity: 0/10   Description: sharp  Aggravating Factors:  functional use  Relieving Factors:  Rest     Relevant Information (PMH & Previous Tests/Imaging): reviewed in chart     Prior Level of Function (PLOF)  Exercise/Physical Activity: jigsaw puzzles  Work/School: retired  Current ADL/IADL Status: independent with homemaking     Patients Living Environment: Reviewed and no concern    Primary Language: English    Pt goals for therapy: improve functional use of L hand for cooking, cleaning, dressing, jigsaw puzzles, and various household chores    Red Flags: Do you have any of the following? No  Fever/chills, unexplained weight changes, dizziness/fainting,  unexplained change in bowel or bladder functions, unexplained malaise or muscle weakness, night pain/sweats, numbness or tingling    Objective:     Distance from tips to palm -; MCP/PIP/DIP flexion ; achieved 1 cm post treatment for all digits  2nd: 1 cm  ; 65/100/75   - post treatment 75 ; 0 cm    3rd: 1.5 cm   ;  65/100/70  - post treatment 75 ; 1.5 cm    4th: 1.5 cm  ;  70/105/40 - post treatment 75; 1.5 cm    5th: 2 cm   ; 55/95/40 - - post treatment 70 ; 1.5 cm   Thumb opposition to tip of 4th   Wrist extension/flexion 75 /62   Wrist ulnar/radial deviation 20/10  Forearm pronation/supination 70 /75    Elbow extension/flexion: 23 -115   ;  passive post treatment    strength: 50#/12#  level 3 was 16# was 15#  Lateral pinch: 12#/3#  Physical Observation: significant limitations with elbow and digit range of motion, MCPs most limited, extensor habitus posture of hand    Edema: moderate    Sensory: no complaints of sensory issues     Good rehab potential    Outcome Measures:  Quick DASH: 35    EDUCATION: home exercise program, plan of care, activity modifications, pain management, and injury pathology     Goals:  Active       OT Goals       OT Goal 1 (Progressing)       Start:  08/02/24    Expected End:  12/06/24       Achieve tips to palm for grasping objects in hand.         OT Goal 2 (Progressing)       Start:  08/02/24    Expected End:  12/06/24       Improve elbow flexion to 120 degrees for dressing tasks.         OT Goal 3 (Progressing)       Start:  08/02/24    Expected End:  12/27/24       Improve elbow extension to 15 degrees for reaching to grasp objects.         OT Goal 4 (Progressing)       Start:  08/02/24    Expected End:  12/27/24       Improve Quick DASH to 15%.           OT Goal 5 (Progressing)       Start:  08/02/24    Expected End:  12/27/24       Improve  strength to 70% of unaffected side for opening jars.           Plan of care was developed with input and agreement by the  patient.    Treatments:      Therapeutic Exercise:   25 min  HEP education and completion: MCP flexion stretch, IP flexion stretch, composite digit flexion stretch, prayer stretch, wrist flexion stretch, and forearm pronation stretch.  Digits positioned into end range digit flexion (intrinsic minus) with coban and use of heating pad to improve tissue extensibility and improve range of motion.   Place and hold into end range digit flexion.   Sponge pickup with hand gripper on light resistance   Sponge pickup with red resistance clip   Grasping with power web and extension stretch. - did not complete today        Manual Therapy    20 min  Therapist performed manual digit, wrist, and elbow range of motion.  Therapist performed A/P joint mobilization combined with MCP flexion.  Therapist positioned MCPs in end range flexion with patient making active fist.      Assessment:    Assessed pinch strength today. Continued strengthening and range of motion exercises today.         Plan:      Planned Interventions include: therapeutic exercise, therapeutic activity, self-care home management, manual therapy, therapeutic activities, gait training, neuromuscular coordination, vasopneumatic, dry needling, aquatic therapy, electric stimulation, fluidotherapy, ultrasound, kinesiotaping, orthosis fabrication, wound care  Frequency: 1-2 x Week  Duration: 12 Weeks      Felipe Lee, OT

## 2025-02-07 NOTE — PROGRESS NOTES
CHIEF COMPLAINT: Left ulnar nerve sxs   DOI:5/16/24  DOS:  ORIF L Distal humerus fx by Dr. Oates on 5/30/24      HISTORY OF PRESENT ILLNESS    This is a very pleasant 79-year-old female, right-hand-dominant, retired but very active and highly functional lives with her , denies active tobacco use denies diabetes denies anticoagulation use.  Denies significant cardiopulmonary past medical history.  She is presenting as a new patient evaluation for left-sided ulnar nerve symptoms.  She unfortunately sustained a left distal humerus fracture on the above date and was treated with an ORIF by Dr. Oates.    She has done well from her fixation and has regained a lot of her elbow motion however she has struggled with ulnar neuritis after surgery.  She was initially on gabapentin however has weaned off.  She is very happy with her progress that she has made with occupational therapy.  While her strength is not normal she is thrilled with the trend towards improvement.  Her abnormal sensory disturbances are also improving in both severity and frequency.    Interval update 1/31/2025:  Patient return for scheduled follow-up visit to discuss the results of her ultrasound.  She continues to be very happy with the progress that she is making with occupational therapy.  She is mildly frustrated with the lack of terminal extension.  She explains that she is only having paresthesias when attempting maximal extension.  She has no pain at rest.  She is happy with her hand function.    PHYSICAL EXAM    Extremities / Musculoskeletal:                  Left upper extremity:  Well-healed surgical wounds.  No gross deformity.  No active skin lesions.  Elbow flexion extension approximately 30-90 near full pronosupination.  No obvious signs of atrophy in the forearm.  Slight narrowing of the palmar width.  Sensation intact but slightly diminished at the ulnar nerve distribution.  Sensation intact and normal with the median radial nerve  distribution.  Modest ulnar clawing.  Very weak but intact digital abduction.  Positive Jeansonne positive Froment's sign.  4- out of 5 FDP to the small finger 4- out of 5 first dorsal interosseous.  Motor intact radial PIN median AIN.  2+ radial warm well-perfused    IMAGING / LABS / EMGs:    Left hand x-ray series obtained today and independent reviewed demonstrating no signs of acute fracture or dislocation.  Normal global alignment.    EMG from 1/7/2025 demonstrating electrophysiologic evidence consistent with a nonlocalizable left ulnar neuropathy with active denervation in the ulnar innervated muscles.  Low ulnar sensory potential amplitude absent dorsal ulnar sensory response and active denervation noted in the flexor digitorum profundus which implicated lesion at approximately takeoff of the FDP.    Neuromuscular ultrasound performed on 1/28/2025:  IMPRESSION:     This is an abnormal and complex neuromuscular ultrasound examination of the left upper extremity.      There was ultrasound evidence of a mild to moderate ulnar neuropathy at the elbow. The location of the lesion was at the distal humerus proximal to retrocondylar groove. The ulnar nerve are immediately adjacent to 2 screws at the medial epicondyle. However, no definite screw or other hardware impingement on the ulnar nerve was observed. The ulnar nerve was in continuity, with no evidence of neuroma or laceration. The ulnar nerve was followed through its anatomic course in the limb from wrist to axilla and was otherwise normal.     There is an denervation atrophy of the ulnar innervated muscles including deep head of the flexor pollicis brevis, medial flexor digitorum profundus and flexor carpi ulnaris.     In addition there was prominent denervation atrophy of the pronator quadratus muscle.  However this was not accompanied by denervation atrophy of other muscles supplied by the anterior interosseous nerve and may represent a sequelae of prior  trauma.     The left elbow joint showed significant degenerative changes, including sclerotic changes, osteophytes, particularly at the medial epicondyle. There is joint widening and increased effusion in the radiocapitellar, ulnar trochlear, and posterior elbow joints. Mild sclerotic changes with effusion was seen in the radial fossa. The olecranon showed screw shadowing, osteophytes, and arthritic changes, with effusion in the olecranon fossa. These findings are consistent with the patient’s history of a distal humerus fracture and prior surgery.     For comparison purposes and to assess for additional pathology, the median nerve was also studied and was normal. There was a mild synovial hypertrophy of the radiocarpal joint.     The other visualized osseous, ligamentous and tendinous structures appeared normal.      ASSESSMENT:   Ulnar palsy and neuritis following distal humerus fracture treated with ORIF.    We do long discussion regarding my role in this situation.  Her ulnar nerve is certainly affected with weakness and sensory disturbances.  However it should not be lost that she has made phenomenal progress with occupational therapy and is overall very content with her progress.  I think in addition to the electrodiagnostic studies it would be helpful to obtain a neuromuscular ultrasound to evaluate the ulnar nerve through his entire course looking for signs of stenosis or prestenotic dilation and perhaps any signs of injury.    Interval update 1/28/25:  We have another long discussion regarding the results of the neuromuscular ultrasound of the broader context of ulnar nerve irritation and the results of her prior electrodiagnostic studies.  Fortunately there is no definitive hardware impingement on the nerve or entrapment under the plate.  There is no sign of obvious neuroma or laceration.  We discussed that at baseline she does not have to have any procedure and the fact that she is trending towards  continued improvement that is excellent and there are risks of surgery that could delay that her make her situation worse.  I think that it would be reasonable to consider an ulnar nerve neurolysis cubital tunnel release and anterior transposition which could possibly help with the periodic positional neuritis and help with any ischemic issues to the nerve.  We could also consider a Guyon's release in order to remove any potential downstream sites of compression impairing full return of the ulnar intrinsic musculature.  We also discussed the possibility of an AIN end-to-side supercharge.  Based on the time that is gone by as well as the suspected location at the elbow I think would be unrealistic to have any traumatic improvement in these procedures should only be considered if we are desperate for any potential modest improvement.  Currently I think she is doing so well and she is so happy that I would avoid rushing into surgery at this time.      PLAN:   Continue OT      Follow-up in: 4 to 5 months  XR at next visit: None    The diagnosis and treatment plan were reviewed with the patient. All questions were answered. The patient verbalized understanding of the treatment plan. There were no barriers to understanding identified.     Note dictated with [employer] software.  Completed without full type editing and sent to avoid delay.    Gurvinder Wright M.D.    Department of Orthopaedics  Hand/Upper Extremity  Phone: [phone]  Appt. Phone: [phone]\

## 2025-02-11 ENCOUNTER — TREATMENT (OUTPATIENT)
Dept: OCCUPATIONAL THERAPY | Facility: HOSPITAL | Age: 80
End: 2025-02-11
Payer: MEDICARE

## 2025-02-11 DIAGNOSIS — S42.411A CLOSED FRACTURE OF SUPRACONDYLAR HUMERUS, RIGHT, INITIAL ENCOUNTER: ICD-10-CM

## 2025-02-11 PROCEDURE — 97140 MANUAL THERAPY 1/> REGIONS: CPT | Mod: GO | Performed by: OCCUPATIONAL THERAPIST

## 2025-02-11 PROCEDURE — 97110 THERAPEUTIC EXERCISES: CPT | Mod: GO | Performed by: OCCUPATIONAL THERAPIST

## 2025-02-11 NOTE — PROGRESS NOTES
Occupational Therapy  Occupational Therapy Orthopedic Treatment    Patient Name: Gertrudis Cottrell  MRN: [id number]  Today's Date: 2/11/2025 1/9/25 - J - 2025 VERIFIED - MEDICARE A/B - NO AUTH / $257 DEDUCT MET / 80% COINS / $0 used PT/ST 2025. [employer]  SUPP - PLAN G / PER RTE  Insurance Type: Payor: MEDICARE / Plan: MEDICARE PART A AND B / Product Type: *No Product type* /     Time:  Time Calculation  Start Time: 0800  Stop Time: 0845  Time Calculation (min): 45 min  OT Therapeutic Procedures Time Entry  Manual Therapy Time Entry: 20  Therapeutic Exercise Time Entry: 25        Insurance:  Visit number: 8  since 2025   General:  Reason for visit: L elbow  Referred by: Hilda Patton PA-C    Current Problem  1. Closed fracture of supracondylar humerus, right, initial encounter  Follow Up In Occupational Therapy        Insurance Type: Payor: MEDICARE / Plan: MEDICARE PART A AND B / Product Type: *No Product type* /     Precautions: per post op protocol     Medical History Form: Reviewed (scanned into chart)    Subjective: We have been working on the exercises continuously.     Chief Complaint: L arm/hand  BASILIO: Fall   DOI: 05/12/2024  DOS: 05/30/2024 ORIF L elbow    Hand Dominance: Right    Current Condition since injury:   better      PAIN     Location: L elbow and hand  Intensity: 0/10   Description: sharp  Aggravating Factors:  functional use  Relieving Factors:  Rest     Relevant Information (PMH & Previous Tests/Imaging): reviewed in chart     Prior Level of Function (PLOF)  Exercise/Physical Activity: jigsaw puzzles  Work/School: retired  Current ADL/IADL Status: independent with homemaking     Patients Living Environment: Reviewed and no concern    Primary Language: English    Pt goals for therapy: improve functional use of L hand for cooking, cleaning, dressing, jigsaw puzzles, and various household chores    Red Flags: Do you have any of the following? No  Fever/chills, unexplained weight changes,  dizziness/fainting, unexplained change in bowel or bladder functions, unexplained malaise or muscle weakness, night pain/sweats, numbness or tingling    Objective:     Distance from tips to palm -; MCP/PIP/DIP flexion ; achieved 1 cm post treatment for all digits  2nd: 1 cm  ; 65/100/75   - post treatment 75 ; 0 cm    3rd: 1.5 cm   ;  65/100/70  - post treatment 75 ; 1.5 cm    4th: 1.5 cm  ;  70/105/40 - post treatment 75; 1.5 cm    5th: 2 cm   ; 55/95/40 - - post treatment 70 ; 1.5 cm   Thumb opposition to tip of 4th   Wrist extension/flexion 75 /62   Wrist ulnar/radial deviation 20/10  Forearm pronation/supination 70 /75    Elbow extension/flexion: 23 -115   ;  passive post treatment    strength: 50#/15# was 12#  level 3 was 16#   Lateral pinch: 12#/3#  Physical Observation: significant limitations with elbow and digit range of motion, MCPs most limited, extensor habitus posture of hand    Edema: moderate    Sensory: no complaints of sensory issues     Good rehab potential    Outcome Measures:  Quick DASH: 35    EDUCATION: home exercise program, plan of care, activity modifications, pain management, and injury pathology     Goals:  Active       OT Goals       OT Goal 1 (Progressing)       Start:  08/02/24    Expected End:  12/06/24       Achieve tips to palm for grasping objects in hand.         OT Goal 2 (Progressing)       Start:  08/02/24    Expected End:  12/06/24       Improve elbow flexion to 120 degrees for dressing tasks.         OT Goal 3 (Progressing)       Start:  08/02/24    Expected End:  12/27/24       Improve elbow extension to 15 degrees for reaching to grasp objects.         OT Goal 4 (Progressing)       Start:  08/02/24    Expected End:  12/27/24       Improve Quick DASH to 15%.           OT Goal 5 (Progressing)       Start:  08/02/24    Expected End:  12/27/24       Improve  strength to 70% of unaffected side for opening jars.           Plan of care was developed with input and  agreement by the patient.    Treatments:      Therapeutic Exercise:   25 min  HEP education and completion: MCP flexion stretch, IP flexion stretch, composite digit flexion stretch, prayer stretch, wrist flexion stretch, and forearm pronation stretch.  Digits positioned into end range digit flexion with coban and use of heating pad to improve tissue extensibility and improve range of motion.   Place and hold into end range digit flexion.   Sponge pickup with hand gripper on light resistance   Sponge pickup with red resistance clip.  Intrinsic slide with foam piece.   Green flexbar: forearm pronation, forearm supination, wrist extension, and wrist flexion.   Putty tools for functional strengthening.       Manual Therapy    20 min  Therapist performed manual digit flexion and intrinsic stretches.  Therapist performed A/P joint mobilization combined with MCP flexion.  Therapist positioned MCPs in end range flexion with patient making active fist.      Assessment:     strength improved today. Patient to continue working on end range motion and /pinch strengthening. Patient to follow up Friday.         Plan:      Planned Interventions include: therapeutic exercise, therapeutic activity, self-care home management, manual therapy, therapeutic activities, gait training, neuromuscular coordination, vasopneumatic, dry needling, aquatic therapy, electric stimulation, fluidotherapy, ultrasound, kinesiotaping, orthosis fabrication, wound care  Frequency: 1-2 x Week  Duration: 12 Weeks      Felipe Lee OT

## 2025-02-14 ENCOUNTER — TREATMENT (OUTPATIENT)
Dept: OCCUPATIONAL THERAPY | Facility: HOSPITAL | Age: 80
End: 2025-02-14
Payer: MEDICARE

## 2025-02-14 ENCOUNTER — APPOINTMENT (OUTPATIENT)
Dept: PRIMARY CARE | Facility: CLINIC | Age: 80
End: 2025-02-14
Payer: MEDICARE

## 2025-02-14 DIAGNOSIS — S42.411A CLOSED FRACTURE OF SUPRACONDYLAR HUMERUS, RIGHT, INITIAL ENCOUNTER: ICD-10-CM

## 2025-02-14 PROCEDURE — 97140 MANUAL THERAPY 1/> REGIONS: CPT | Mod: GO | Performed by: OCCUPATIONAL THERAPIST

## 2025-02-14 PROCEDURE — 97110 THERAPEUTIC EXERCISES: CPT | Mod: GO | Performed by: OCCUPATIONAL THERAPIST

## 2025-02-14 NOTE — PROGRESS NOTES
Occupational Therapy  Occupational Therapy Orthopedic Treatment    Patient Name: Gertrudis Cottrell  MRN: [id number]  Today's Date: 2/14/2025 1/9/25 - J - 2025 VERIFIED - MEDICARE A/B - NO AUTH / $257 DEDUCT MET / 80% COINS / $0 used PT/ST 2025. [employer]  SUPP - PLAN G / PER RTE  Insurance Type: Payor: MEDICARE / Plan: MEDICARE PART A AND B / Product Type: *No Product type* /   Time:  Time Calculation  Start Time: 0845  Stop Time: 0930  Time Calculation (min): 45 min  OT Therapeutic Procedures Time Entry  Manual Therapy Time Entry: 20  Therapeutic Exercise Time Entry: 25        Insurance:  Visit number: 9  since 2025   General:  Reason for visit: L elbow  Referred by: Hilda Patton PA-C    Current Problem  1. Closed fracture of supracondylar humerus, right, initial encounter  Follow Up In Occupational Therapy        Insurance Type: Payor: MEDICARE / Plan: MEDICARE PART A AND B / Product Type: *No Product type* /     Precautions: per post op protocol     Medical History Form: Reviewed (scanned into chart)    Subjective: I got the flexbar and hand gripper.     Chief Complaint: L arm/hand  BASILIO: Fall   DOI: 05/12/2024  DOS: 05/30/2024 ORIF L elbow    Hand Dominance: Right    Current Condition since injury:   better      PAIN     Location: L elbow and hand  Intensity: 0/10   Description: sharp  Aggravating Factors:  functional use  Relieving Factors:  Rest     Relevant Information (PMH & Previous Tests/Imaging): reviewed in chart     Prior Level of Function (PLOF)  Exercise/Physical Activity: jigsaw puzzles  Work/School: retired  Current ADL/IADL Status: independent with homemaking     Patients Living Environment: Reviewed and no concern    Primary Language: English    Pt goals for therapy: improve functional use of L hand for cooking, cleaning, dressing, jigsaw puzzles, and various household chores    Red Flags: Do you have any of the following? No  Fever/chills, unexplained weight changes, dizziness/fainting,  unexplained change in bowel or bladder functions, unexplained malaise or muscle weakness, night pain/sweats, numbness or tingling    Objective:     Distance from tips to palm -; MCP/PIP/DIP flexion ; achieved 1 cm post treatment for all digits  2nd: 1 cm  ; 65/100/75   - post treatment 75 ; 0 cm    3rd: 1.5 cm   ;  65/100/70  - post treatment 75 ; 1.5 cm    4th: 1.5 cm  ;  70/105/40 - post treatment 75; 1.5 cm    5th: 2 cm   ; 55/102 was 95/40 - - post treatment 72 was 70 ; 1.5 cm   Thumb opposition to tip of 4th   Wrist extension/flexion 75 /62   Wrist ulnar/radial deviation 20/10  Forearm pronation/supination 70 /75    Elbow extension/flexion: 23 -115   ;  passive post treatment    strength: 50#/15#   level 3 was 16#   Lateral pinch: 12#/3#  Physical Observation: significant limitations with elbow and digit range of motion, MCPs most limited, extensor habitus posture of hand    Edema: moderate    Sensory: no complaints of sensory issues     Good rehab potential    Outcome Measures:  Quick DASH: 35    EDUCATION: home exercise program, plan of care, activity modifications, pain management, and injury pathology     Goals:  Active       OT Goals       OT Goal 1 (Progressing)       Start:  08/02/24    Expected End:  12/06/24       Achieve tips to palm for grasping objects in hand.         OT Goal 2 (Progressing)       Start:  08/02/24    Expected End:  12/06/24       Improve elbow flexion to 120 degrees for dressing tasks.         OT Goal 3 (Progressing)       Start:  08/02/24    Expected End:  12/27/24       Improve elbow extension to 15 degrees for reaching to grasp objects.         OT Goal 4 (Progressing)       Start:  08/02/24    Expected End:  12/27/24       Improve Quick DASH to 15%.           OT Goal 5 (Progressing)       Start:  08/02/24    Expected End:  12/27/24       Improve  strength to 70% of unaffected side for opening jars.           Plan of care was developed with input and agreement by  the patient.    Treatments:      Therapeutic Exercise:   25 min  HEP education and completion: MCP flexion stretch, IP flexion stretch, composite digit flexion stretch, prayer stretch, wrist flexion stretch, and forearm pronation stretch.  Digits positioned into end range digit flexion with coban and use of heating pad to improve tissue extensibility and improve range of motion.   Place and hold into end range digit flexion.   Sponge pickup with hand gripper on light resistance   Sponge pickup with red resistance clip.  Intrinsic slide with foam piece.   Green flexbar: forearm pronation, forearm supination, wrist extension, and wrist flexion.   Putty tools for functional strengthening.       Manual Therapy    20 min  Therapist performed manual digit flexion and intrinsic stretches.  Therapist performed A/P joint mobilization combined with MCP flexion.  Therapist positioned MCPs in end range flexion with patient making active fist.      Assessment:     SF ROM improved today post treatment session. Patient reports good carryover and compliance with home program. Patient to follow up in two weeks.         Plan:      Planned Interventions include: therapeutic exercise, therapeutic activity, self-care home management, manual therapy, therapeutic activities, gait training, neuromuscular coordination, vasopneumatic, dry needling, aquatic therapy, electric stimulation, fluidotherapy, ultrasound, kinesiotaping, orthosis fabrication, wound care  Frequency: 1-2 x Week  Duration: 12 Weeks      Felipe Lee OT

## 2025-02-18 ENCOUNTER — APPOINTMENT (OUTPATIENT)
Dept: OCCUPATIONAL THERAPY | Facility: HOSPITAL | Age: 80
End: 2025-02-18
Payer: MEDICARE

## 2025-02-20 ENCOUNTER — APPOINTMENT (OUTPATIENT)
Dept: OBSTETRICS AND GYNECOLOGY | Facility: CLINIC | Age: 80
End: 2025-02-20
Payer: MEDICARE

## 2025-02-20 VITALS
HEIGHT: 64 IN | WEIGHT: 138 LBS | SYSTOLIC BLOOD PRESSURE: 140 MMHG | DIASTOLIC BLOOD PRESSURE: 82 MMHG | BODY MASS INDEX: 23.56 KG/M2

## 2025-02-20 DIAGNOSIS — Z01.419 ENCOUNTER FOR WELL WOMAN EXAM WITH ROUTINE GYNECOLOGICAL EXAM: Primary | ICD-10-CM

## 2025-02-20 DIAGNOSIS — Z12.31 ENCOUNTER FOR SCREENING MAMMOGRAM FOR BREAST CANCER: ICD-10-CM

## 2025-02-20 PROCEDURE — G0101 CA SCREEN;PELVIC/BREAST EXAM: HCPCS | Performed by: OBSTETRICS & GYNECOLOGY

## 2025-02-20 PROCEDURE — 1159F MED LIST DOCD IN RCRD: CPT | Performed by: OBSTETRICS & GYNECOLOGY

## 2025-02-20 PROCEDURE — 1036F TOBACCO NON-USER: CPT | Performed by: OBSTETRICS & GYNECOLOGY

## 2025-02-20 PROCEDURE — 1160F RVW MEDS BY RX/DR IN RCRD: CPT | Performed by: OBSTETRICS & GYNECOLOGY

## 2025-02-20 NOTE — PROGRESS NOTES
ASSESSMENT/PLAN  1. Encounter for well woman exam with routine gynecological exam (Primary)  Routine well woman visit.   Your exam was normal today.   A pap test was done. H/o hysterectomy.     2. Encounter for screening mammogram for breast cancer  Your clinical breast exam was normal.   A screening mammogram order has been placed after 3/15/2025.  Please schedule your mammogram.     ------------------------------------------------------------    SUBJECTIVE    PAP hyst  MAMMO 3-15-24 screening, R breast asymmetry/   3-20-24 R breast dx mamm and US  DEXA 3- T=-2.4  COLON: 2019    HPI    78 yo for new patient well woman visit.   Last visit 1/2023.   h/o vaginal hysterectomy age 44. Bladder injury at time of hysterectomy. Pt uncertain the reason for the hysterectomy.  2024 mammogram, asymmetry right breast. Followup diagnostic mamm and ultrasound normal.   8 months ago fell while in [place]. Shattered left humerus. Surgery. Long recovery.   In past on Prolia for approx 5 years. Now off and taking calcium and vit D. Reports that the ortho providers thought Prolia contributed to humerus fracture severity. Last bone density 2023.    Up to date with PCP, Dr. Briseno.  Son went to [place]. Lives in [place].  , no smoke, ETOH 4/wk.  Denies family h/o female cancers.     REVIEW OF SYSTEMS    Constitutional: no recent weight gain, no fatigue.   ENT: no hearing loss.  Cardiovascular: no chest pain, no palpitations.  Respiratory: no shortness of breath.  Gastrointestinal: no abdominal pain, no constipation, no nausea, no diarrhea.  Musculoskeletal: no back pain.  Lymphatic: no swollen glands.  Genitourinary: no pelvic pain, no urinary urgency, no urinary incontinence, no change in urinary frequency, no vaginal dryness, no vaginal itching,  no vaginal discharge, no unexplained vaginal bleeding, no lesion/sore.   Breasts: no breast pain, no nipple discharge, no breast lump.   Neurological: no headache, no numbness,  "no dizziness.   Psychiatric: no sleep disturbances, no anxiety, no depression.   Endocrine: no hot flashes, no loss of hair, no hirsutism.       OBJECTIVE    /82   Ht 1.626 m (5' 4\")   Wt 62.6 kg (138 lb)   BMI 23.69 kg/m²      Physical Exam     Constitutional: Alert and in no acute distress. Well developed, well nourished   Head and Face: Head and face: normal   Eyes: Normal external exam - nonicteric sclera.  Ears, Nose, Mouth, and Throat: External inspection of ears and nose: normal   Neck: no neck asymmetry. Supple and thyroid not enlarged and there were no palpable thyroid nodules   Cardiovascular: Heart rate and rhythm were normal  Pulmonary: No respiratory distress and clear bilateral breath sounds   Chest: Breasts: normal appearance, no nipple discharge, no skin changes. Palpation of breasts and axillae: no palpable mass, no axillary lymphadenopathy   Abdomen: soft nontender; no abdominal mass palpated, no organomegaly and no hernias   Genitourinary: external genitalia: normal appearing vulva and labia without lesions. No inguinal lymphadenopathy,    Urethra: normal.   Bladder: normal on palpation.   Perianal area: normal   Vagina: normal, without significant discharge, no lesions.  Cervix and uterus absent  Right and left adnexa/parametria: Normal  Skin: normal skin color and pigmentation, normal skin turgor, and no rash  Psychiatric: alert and oriented x 3., affect normal to patient baseline and mood: appropriate        Priya Lovell MD    "

## 2025-02-21 ENCOUNTER — APPOINTMENT (OUTPATIENT)
Dept: OCCUPATIONAL THERAPY | Facility: HOSPITAL | Age: 80
End: 2025-02-21
Payer: MEDICARE

## 2025-02-25 ENCOUNTER — APPOINTMENT (OUTPATIENT)
Dept: OCCUPATIONAL THERAPY | Facility: HOSPITAL | Age: 80
End: 2025-02-25
Payer: MEDICARE

## 2025-02-28 ENCOUNTER — TREATMENT (OUTPATIENT)
Dept: OCCUPATIONAL THERAPY | Facility: HOSPITAL | Age: 80
End: 2025-02-28
Payer: MEDICARE

## 2025-02-28 DIAGNOSIS — S42.411A CLOSED FRACTURE OF SUPRACONDYLAR HUMERUS, RIGHT, INITIAL ENCOUNTER: ICD-10-CM

## 2025-02-28 PROCEDURE — 97110 THERAPEUTIC EXERCISES: CPT | Mod: GO | Performed by: OCCUPATIONAL THERAPIST

## 2025-02-28 PROCEDURE — 97140 MANUAL THERAPY 1/> REGIONS: CPT | Mod: GO | Performed by: OCCUPATIONAL THERAPIST

## 2025-02-28 NOTE — PROGRESS NOTES
Occupational Therapy  Occupational Therapy Orthopedic Progress Note    Patient Name: Gertrudis Cottrell  MRN: [id number]  Today's Date: 2/28/2025 1/9/25 - J - 2025 VERIFIED - MEDICARE A/B - NO AUTH / $257 DEDUCT MET / 80% COINS / $0 used PT/ST 2025. [employer]  SUPP - PLAN G / PER RTE  Insurance Type: Payor: MEDICARE / Plan: MEDICARE PART A AND B / Product Type: *No Product type* /   Time:  Time Calculation  Start Time: 0845  Stop Time: 0930  Time Calculation (min): 45 min  OT Therapeutic Procedures Time Entry  Manual Therapy Time Entry: 20  Therapeutic Exercise Time Entry: 20        Insurance:  Visit number: 10  since 2025   General:  Reason for visit: L elbow  Referred by: Hilda Patton PA-C    Current Problem  1. Closed fracture of supracondylar humerus, right, initial encounter  Follow Up In Occupational Therapy        Insurance Type: Payor: MEDICARE / Plan: MEDICARE PART A AND B / Product Type: *No Product type* /     Precautions: per post op protocol     Medical History Form: Reviewed (scanned into chart)    Subjective: I can sqeeze the gripper more    Chief Complaint: L arm/hand  BASILIO: Fall   DOI: 05/12/2024  DOS: 05/30/2024 ORIF L elbow    Hand Dominance: Right    Current Condition since injury:   better      PAIN     Location: L elbow and hand  Intensity: 0/10   Description: sharp  Aggravating Factors:  functional use  Relieving Factors:  Rest     Relevant Information (PMH & Previous Tests/Imaging): reviewed in chart     Prior Level of Function (PLOF)  Exercise/Physical Activity: jigsaw puzzles  Work/School: retired  Current ADL/IADL Status: independent with homemaking     Patients Living Environment: Reviewed and no concern    Primary Language: English    Pt goals for therapy: improve functional use of L hand for cooking, cleaning, dressing, jigsaw puzzles, and various household chores    Red Flags: Do you have any of the following? No  Fever/chills, unexplained weight changes, dizziness/fainting, unexplained  change in bowel or bladder functions, unexplained malaise or muscle weakness, night pain/sweats, numbness or tingling    Objective:     Distance from tips to palm -; MCP/PIP/DIP flexion ; achieved 1 cm post treatment for all digits  2nd: 0 cm was 1 cm  ; 70 was 65/100/75   - post treatment 75 ; 0 cm    3rd: 1.5 cm   ;  70 was 65/100/70  - post treatment 75 ; 1.5 cm    4th: 1 cm was 1.5 cm  ;  77 was 70/105/40 - post treatment 75; 1.5 cm    5th: 1.5 cm was 2 cm   ; 75 was 55/102 was 95/40 - - post treatment 72  ; 1.5 cm   Thumb opposition to tip of 4th   Wrist extension/flexion 75 /62   Wrist ulnar/radial deviation 20/10  Forearm pronation/supination 70 /75    Elbow extension/flexion: 23 -115   ;  passive post treatment    strength: 50#/15#   level 3 was 16#   Lateral pinch: 12#/3#  Physical Observation: significant limitations with elbow and digit range of motion, MCPs most limited, extensor habitus posture of hand    Edema: moderate    Sensory: no complaints of sensory issues     Good rehab potential    Outcome Measures:  Quick DASH: 22.5 was 35    EDUCATION: home exercise program, plan of care, activity modifications, pain management, and injury pathology     Goals:  Active       OT Goals       OT Goal 1 (Progressing)       Start:  08/02/24    Expected End:  04/18/25       Achieve tips to palm for grasping objects in hand.         OT Goal 2 (Progressing)       Start:  08/02/24    Expected End:  04/18/25       Improve elbow flexion to 120 degrees for dressing tasks.         OT Goal 3 (Progressing)       Start:  08/02/24    Expected End:  04/18/25       Improve elbow extension to 15 degrees for reaching to grasp objects.         OT Goal 4 (Progressing)       Start:  08/02/24    Expected End:  04/18/25       Improve Quick DASH to 15%.           OT Goal 5 (Progressing)       Start:  08/02/24    Expected End:  04/18/25       Improve  strength to 70% of unaffected side for opening jars.           Plan of  care was developed with input and agreement by the patient.    Treatments:     Heating pad applied to circumferential hand.   Therapeutic Exercise:   20 min  HEP education and completion: MCP flexion stretch, IP flexion stretch, composite digit flexion stretch, prayer stretch, wrist flexion stretch, and forearm pronation stretch.  Place and hold into end range digit flexion.   Sponge pickup with hand gripper on light resistance   Sponge pickup with red resistance clip.  Intrinsic slide with foam piece.   Green flexbar: forearm pronation, forearm supination, wrist extension, and wrist flexion. Did not complete today  Putty tools for functional strengthening. Did not complete today      Manual Therapy    20 min  Therapist performed manual digit flexion and intrinsic stretches.  Therapist performed A/P joint mobilization combined with MCP flexion.  Therapist positioned MCPs in end range flexion with patient making active fist.    Assessment:      MCP flexion improved. Patient reports good compliance with home program and wear of mobilization splints. Patient to follow up in two weeks. Patient to benefit from continued therapy services to progress range of motion and functional strengthening.     Plan:      Planned Interventions include: therapeutic exercise, therapeutic activity, self-care home management, manual therapy, therapeutic activities, gait training, neuromuscular coordination, vasopneumatic, dry needling, aquatic therapy, electric stimulation, fluidotherapy, ultrasound, kinesiotaping, orthosis fabrication, wound care  Frequency: 1-2 x Week  Duration: 12 Weeks      Felipe Lee OT

## 2025-03-04 ENCOUNTER — APPOINTMENT (OUTPATIENT)
Dept: OCCUPATIONAL THERAPY | Facility: HOSPITAL | Age: 80
End: 2025-03-04
Payer: MEDICARE

## 2025-03-07 ENCOUNTER — APPOINTMENT (OUTPATIENT)
Dept: OCCUPATIONAL THERAPY | Facility: HOSPITAL | Age: 80
End: 2025-03-07
Payer: MEDICARE

## 2025-03-07 ENCOUNTER — APPOINTMENT (OUTPATIENT)
Dept: PRIMARY CARE | Facility: CLINIC | Age: 80
End: 2025-03-07
Payer: MEDICARE

## 2025-03-11 ENCOUNTER — APPOINTMENT (OUTPATIENT)
Dept: OCCUPATIONAL THERAPY | Facility: HOSPITAL | Age: 80
End: 2025-03-11
Payer: MEDICARE

## 2025-03-14 ENCOUNTER — APPOINTMENT (OUTPATIENT)
Dept: PRIMARY CARE | Facility: CLINIC | Age: 80
End: 2025-03-14
Payer: MEDICARE

## 2025-03-14 ENCOUNTER — TREATMENT (OUTPATIENT)
Dept: OCCUPATIONAL THERAPY | Facility: HOSPITAL | Age: 80
End: 2025-03-14
Payer: MEDICARE

## 2025-03-14 DIAGNOSIS — D51.9 ANEMIA DUE TO VITAMIN B12 DEFICIENCY, UNSPECIFIED B12 DEFICIENCY TYPE: Primary | ICD-10-CM

## 2025-03-14 DIAGNOSIS — S42.411A CLOSED FRACTURE OF SUPRACONDYLAR HUMERUS, RIGHT, INITIAL ENCOUNTER: ICD-10-CM

## 2025-03-14 DIAGNOSIS — Z00.00 HEALTH CARE MAINTENANCE: ICD-10-CM

## 2025-03-14 PROCEDURE — 97110 THERAPEUTIC EXERCISES: CPT | Mod: GO | Performed by: OCCUPATIONAL THERAPIST

## 2025-03-14 PROCEDURE — 96372 THER/PROPH/DIAG INJ SC/IM: CPT | Performed by: INTERNAL MEDICINE

## 2025-03-14 PROCEDURE — 97140 MANUAL THERAPY 1/> REGIONS: CPT | Mod: GO | Performed by: OCCUPATIONAL THERAPIST

## 2025-03-14 RX ORDER — CYANOCOBALAMIN 1000 UG/ML
1000 INJECTION, SOLUTION INTRAMUSCULAR; SUBCUTANEOUS ONCE
Status: COMPLETED | OUTPATIENT
Start: 2025-03-14 | End: 2025-03-14

## 2025-03-14 RX ADMIN — CYANOCOBALAMIN 1000 MCG: 1000 INJECTION, SOLUTION INTRAMUSCULAR; SUBCUTANEOUS at 13:42

## 2025-03-14 NOTE — PROGRESS NOTES
Occupational Therapy  Occupational Therapy Orthopedic Treatment Note    Patient Name: Gertrudis Cottrell  MRN: [id number]  Today's Date: 3/14/2025  1/9/25 - J - 2025 VERIFIED - MEDICARE A/B - NO AUTH / $257 DEDUCT MET / 80% COINS / $0 used PT/ST 2025. [employer]  SUPP - PLAN G / PER RTE  Insurance Type: Payor: MEDICARE / Plan: MEDICARE PART A AND B / Product Type: *No Product type* /   Time:   Time:  Time Calculation  Start Time: 0845  Stop Time: 0930  Time Calculation (min): 45 min  OT Therapeutic Procedures Time Entry  Manual Therapy Time Entry: 25  Therapeutic Exercise Time Entry: 15         Insurance:  Visit number: 12  since 2025   General:  Reason for visit: L elbow  Referred by: Hilda Patton PA-C    Current Problem  1. Closed fracture of supracondylar humerus, right, initial encounter  Follow Up In Occupational Therapy        Insurance Type: Payor: MEDICARE / Plan: MEDICARE PART A AND B / Product Type: *No Product type* /     Precautions: per post op protocol     Medical History Form: Reviewed (scanned into chart)    Subjective: I still have trouble with the clasp on my necklace.     Chief Complaint: L arm/hand  BASILIO: Fall   DOI: 05/12/2024  DOS: 05/30/2024 ORIF L elbow    Hand Dominance: Right    Current Condition since injury:   better      PAIN     Location: L elbow and hand  Intensity: 0/10   Description: sharp  Aggravating Factors:  functional use  Relieving Factors:  Rest     Relevant Information (PMH & Previous Tests/Imaging): reviewed in chart     Prior Level of Function (PLOF)  Exercise/Physical Activity: jigsaw puzzles  Work/School: retired  Current ADL/IADL Status: independent with homemaking     Patients Living Environment: Reviewed and no concern    Primary Language: English    Pt goals for therapy: improve functional use of L hand for cooking, cleaning, dressing, jigsaw puzzles, and various household chores    Red Flags: Do you have any of the following? No  Fever/chills, unexplained weight  changes, dizziness/fainting, unexplained change in bowel or bladder functions, unexplained malaise or muscle weakness, night pain/sweats, numbness or tingling    Objective:     Distance from tips to palm -; MCP/PIP/DIP flexion ; achieved 1 cm post treatment for all digits  2nd: 0 cm; 75 was 70 /100/75   - post treatment 75 ; 0 cm    3rd: 1.5 cm   ;  75 was 70 /100/70  - post treatment 75 ; 1.5 cm    4th: 1 cm  ;  77 /105/40 - post treatment 75; 1.5 cm    5th: 1.5 cm  ; 75 /102 was 95/40  - post treatment 72  ; 1.5 cm   Thumb opposition to tip of 4th   Wrist extension/flexion 75 /62   Wrist ulnar/radial deviation 20/10  Forearm pronation/supination 70 /75    Elbow extension/flexion: 23 -115   ;  passive post treatment    strength: 50#/15#   level 3 was 16#   Lateral pinch: 12#/4# was 3#  Physical Observation: significant limitations with elbow and digit range of motion, MCPs most limited, extensor habitus posture of hand    Edema: moderate    Sensory: no complaints of sensory issues     Good rehab potential    Outcome Measures:  Quick DASH: 22.5 was 35    EDUCATION: home exercise program, plan of care, activity modifications, pain management, and injury pathology     Goals:  Active       OT Goals       OT Goal 1 (Progressing)       Start:  08/02/24    Expected End:  04/18/25       Achieve tips to palm for grasping objects in hand.         OT Goal 2 (Progressing)       Start:  08/02/24    Expected End:  04/18/25       Improve elbow flexion to 120 degrees for dressing tasks.         OT Goal 3 (Progressing)       Start:  08/02/24    Expected End:  04/18/25       Improve elbow extension to 15 degrees for reaching to grasp objects.         OT Goal 4 (Progressing)       Start:  08/02/24    Expected End:  04/18/25       Improve Quick DASH to 15%.           OT Goal 5 (Progressing)       Start:  08/02/24    Expected End:  04/18/25       Improve  strength to 70% of unaffected side for opening jars.           Plan  of care was developed with input and agreement by the patient.    Treatments:     Heating pad applied to circumferential hand.   Therapeutic Exercise:   15 min  HEP education and completion: MCP flexion stretch, IP flexion stretch, composite digit flexion stretch, prayer stretch, wrist flexion stretch, and forearm pronation stretch.  Place and hold into end range digit flexion.   Sponge pickup with hand gripper on light resistance   Sponge pickup with red resistance clip.  Intrinsic slide with foam piece. Did not complete today  Green flexbar: forearm pronation, forearm supination, wrist extension, and wrist flexion.    Putty tools for functional strengthening.       Manual Therapy    25 min  Therapist performed manual digit flexion and intrinsic stretches.  Therapist performed A/P joint mobilization combined with MCP flexion.  Therapist positioned MCPs in end range flexion with patient making active fist.  Therapist performed elbow distraction combined with extension stretch.    Assessment:    Patient to continue working on end range digit flexion and elbow extension. Patient to continue working to improve functional use of L hand.  strength unchanged. Patient to follow up in two weeks.     Plan:   Patient to follow up in two week.       Felipe Lee, OT

## 2025-03-18 ENCOUNTER — APPOINTMENT (OUTPATIENT)
Dept: OCCUPATIONAL THERAPY | Facility: HOSPITAL | Age: 80
End: 2025-03-18
Payer: MEDICARE

## 2025-03-18 DIAGNOSIS — Z00.00 HEALTH CARE MAINTENANCE: ICD-10-CM

## 2025-03-19 ENCOUNTER — HOSPITAL ENCOUNTER (OUTPATIENT)
Dept: RADIOLOGY | Facility: CLINIC | Age: 80
Discharge: HOME | End: 2025-03-19
Payer: MEDICARE

## 2025-03-19 VITALS — WEIGHT: 138.01 LBS | BODY MASS INDEX: 23.56 KG/M2 | HEIGHT: 64 IN

## 2025-03-19 DIAGNOSIS — Z12.31 ENCOUNTER FOR SCREENING MAMMOGRAM FOR BREAST CANCER: ICD-10-CM

## 2025-03-19 PROCEDURE — 77067 SCR MAMMO BI INCL CAD: CPT | Performed by: RADIOLOGY

## 2025-03-19 PROCEDURE — 77063 BREAST TOMOSYNTHESIS BI: CPT | Performed by: RADIOLOGY

## 2025-03-19 PROCEDURE — 77067 SCR MAMMO BI INCL CAD: CPT

## 2025-03-21 ENCOUNTER — APPOINTMENT (OUTPATIENT)
Dept: OCCUPATIONAL THERAPY | Facility: HOSPITAL | Age: 80
End: 2025-03-21
Payer: MEDICARE

## 2025-03-22 RX ORDER — LEVOTHYROXINE SODIUM 100 UG/1
100 TABLET ORAL
Qty: 90 TABLET | Refills: 0 | Status: SHIPPED | OUTPATIENT
Start: 2025-03-22

## 2025-03-22 RX ORDER — ATORVASTATIN CALCIUM 10 MG/1
10 TABLET, FILM COATED ORAL NIGHTLY
Qty: 90 TABLET | Refills: 0 | Status: SHIPPED | OUTPATIENT
Start: 2025-03-22

## 2025-03-25 ENCOUNTER — APPOINTMENT (OUTPATIENT)
Dept: OCCUPATIONAL THERAPY | Facility: HOSPITAL | Age: 80
End: 2025-03-25
Payer: MEDICARE

## 2025-03-28 ENCOUNTER — TREATMENT (OUTPATIENT)
Dept: OCCUPATIONAL THERAPY | Facility: HOSPITAL | Age: 80
End: 2025-03-28
Payer: MEDICARE

## 2025-03-28 DIAGNOSIS — S42.411A CLOSED FRACTURE OF SUPRACONDYLAR HUMERUS, RIGHT, INITIAL ENCOUNTER: ICD-10-CM

## 2025-03-28 PROCEDURE — 97140 MANUAL THERAPY 1/> REGIONS: CPT | Mod: GO | Performed by: OCCUPATIONAL THERAPIST

## 2025-03-28 PROCEDURE — 97110 THERAPEUTIC EXERCISES: CPT | Mod: GO | Performed by: OCCUPATIONAL THERAPIST

## 2025-03-28 NOTE — PROGRESS NOTES
Occupational Therapy  Occupational Therapy Orthopedic Treatment Note    Patient Name: Gertrudis Cottrell  MRN: [id number]  Today's Date: 3/28/2025  1/9/25 - J - 2025 VERIFIED - MEDICARE A/B - NO AUTH / $257 DEDUCT MET / 80% COINS / $0 used PT/ST 2025. [employer]  SUPP - PLAN G / PER RTE  Insurance Type: Payor: MEDICARE / Plan: MEDICARE PART A AND B / Product Type: *No Product type* /        Time:  Time Calculation  Start Time: 0845  Stop Time: 0930  Time Calculation (min): 45 min  OT Therapeutic Procedures Time Entry  Manual Therapy Time Entry: 20  Therapeutic Exercise Time Entry: 20      Insurance Type: Payor: MEDICARE / Plan: MEDICARE PART A AND B / Product Type: *No Product type* /       Insurance:  Visit number: 13  since 2025   General:  Reason for visit: L elbow  Referred by: Hilda Patton PA-C    Current Problem  1. Closed fracture of supracondylar humerus, right, initial encounter  Follow Up In Occupational Therapy        Insurance Type: Payor: MEDICARE / Plan: MEDICARE PART A AND B / Product Type: *No Product type* /     Precautions: per post op protocol     Medical History Form: Reviewed (scanned into chart)    Subjective: I am working on the strength. I have been working with the putty.    Chief Complaint: L arm/hand  BASILIO: Fall   DOI: 05/12/2024  DOS: 05/30/2024 ORIF L elbow    Hand Dominance: Right    Current Condition since injury:   better      PAIN     Location: L elbow and hand  Intensity: 0/10   Description: sharp  Aggravating Factors:  functional use  Relieving Factors:  Rest     Relevant Information (PMH & Previous Tests/Imaging): reviewed in chart     Prior Level of Function (PLOF)  Exercise/Physical Activity: jigsaw puzzles  Work/School: retired  Current ADL/IADL Status: independent with homemaking     Patients Living Environment: Reviewed and no concern    Primary Language: English    Pt goals for therapy: improve functional use of L hand for cooking, cleaning, dressing, jigsaw puzzles, and  various household chores    Red Flags: Do you have any of the following? No  Fever/chills, unexplained weight changes, dizziness/fainting, unexplained change in bowel or bladder functions, unexplained malaise or muscle weakness, night pain/sweats, numbness or tingling    Objective:     Distance from tips to palm -; MCP/PIP/DIP flexion ; achieved 1 cm post treatment for all digits  2nd: 0 cm; 75 was 70 /100/75   - post treatment 75 ; 0 cm    3rd: 1.5 cm   ;  75 was 70 /100/70  - post treatment 75 ; 1.5 cm    4th: 1 cm  ;  77 /105/40 - post treatment 75; 1.5 cm    5th: 1.5 cm  ; 75 /102 was 95/40  - post treatment 72  ; 1.5 cm   Thumb opposition to tip of 4th   Wrist extension/flexion 75 /62   Wrist ulnar/radial deviation 20/10  Forearm pronation/supination 70 /75    Elbow extension/flexion: 23 -115   ;  passive post treatment    strength: 50#/15#   level 3 was 16#   Lateral pinch: 12#/4#   Physical Observation: significant limitations with elbow and digit range of motion, MCPs most limited, extensor habitus posture of hand    Edema: moderate    Sensory: no complaints of sensory issues     Good rehab potential    Outcome Measures:  Quick DASH: 22.5 was 35    EDUCATION: home exercise program, plan of care, activity modifications, pain management, and injury pathology     Goals:  Active       OT Goals       OT Goal 1 (Progressing)       Start:  08/02/24    Expected End:  04/18/25       Achieve tips to palm for grasping objects in hand.         OT Goal 2 (Progressing)       Start:  08/02/24    Expected End:  04/18/25       Improve elbow flexion to 120 degrees for dressing tasks.         OT Goal 3 (Progressing)       Start:  08/02/24    Expected End:  04/18/25       Improve elbow extension to 15 degrees for reaching to grasp objects.         OT Goal 4 (Progressing)       Start:  08/02/24    Expected End:  04/18/25       Improve Quick DASH to 15%.           OT Goal 5 (Progressing)       Start:  08/02/24     Expected End:  04/18/25       Improve  strength to 70% of unaffected side for opening jars.           Plan of care was developed with input and agreement by the patient.    Treatments:     Heating pad applied to circumferential hand.   Therapeutic Exercise:   20 min  HEP education and completion: MCP flexion stretch, IP flexion stretch, composite digit flexion stretch, prayer stretch, wrist flexion stretch, and forearm pronation stretch.  Place and hold into end range digit flexion.   Sponge pickup with hand gripper on light resistance   Sponge pickup with red resistance clip.  Intrinsic slide with foam piece. Did not complete today  Putty pinching and gripping exercises on table top.  Thenar/hypothenar strengthening with tennis ball.      Manual Therapy    20 min  Therapist performed manual digit flexion and intrinsic stretches.  Therapist performed A/P joint mobilization combined with MCP flexion.  Therapist positioned MCPs in end range flexion with patient making active fist.  Therapist performed elbow distraction combined with extension stretch.    Assessment:        Plan:   Patient to follow up in two week.       Felipe Lee, OT

## 2025-04-01 DIAGNOSIS — Z00.00 HEALTH CARE MAINTENANCE: ICD-10-CM

## 2025-04-04 DIAGNOSIS — D51.9 ANEMIA DUE TO VITAMIN B12 DEFICIENCY, UNSPECIFIED B12 DEFICIENCY TYPE: ICD-10-CM

## 2025-04-05 RX ORDER — LOSARTAN POTASSIUM 25 MG/1
25 TABLET ORAL DAILY
Qty: 90 TABLET | Refills: 0 | Status: SHIPPED | OUTPATIENT
Start: 2025-04-05

## 2025-04-05 RX ORDER — CYANOCOBALAMIN 1000 UG/ML
INJECTION, SOLUTION INTRAMUSCULAR; SUBCUTANEOUS
Qty: 3 ML | Refills: 1 | Status: SHIPPED | OUTPATIENT
Start: 2025-04-05

## 2025-04-11 ENCOUNTER — APPOINTMENT (OUTPATIENT)
Dept: PRIMARY CARE | Facility: CLINIC | Age: 80
End: 2025-04-11
Payer: MEDICARE

## 2025-04-11 ENCOUNTER — TREATMENT (OUTPATIENT)
Dept: OCCUPATIONAL THERAPY | Facility: HOSPITAL | Age: 80
End: 2025-04-11
Payer: MEDICARE

## 2025-04-11 DIAGNOSIS — S42.411A CLOSED FRACTURE OF SUPRACONDYLAR HUMERUS, RIGHT, INITIAL ENCOUNTER: ICD-10-CM

## 2025-04-11 PROCEDURE — 97110 THERAPEUTIC EXERCISES: CPT | Mod: GO | Performed by: OCCUPATIONAL THERAPIST

## 2025-04-11 NOTE — PROGRESS NOTES
Occupational Therapy  Occupational Therapy Orthopedic Treatment Note    Patient Name: Gertrudis Cottrell  MRN: [id number]  Today's Date: 4/11/2025 1/9/25 - J - 2025 VERIFIED - MEDICARE A/B - NO AUTH / $257 DEDUCT MET / 80% COINS / $0 used PT/ST 2025. [employer]  SUPP - PLAN G / PER RTE  Insurance Type: Payor: MEDICARE / Plan: MEDICARE PART A AND B / Product Type: *No Product type* /     Time:  Time Calculation  Start Time: 0845  Stop Time: 0930  Time Calculation (min): 45 min  OT Therapeutic Procedures Time Entry  Therapeutic Exercise Time Entry: 40         Insurance:  Visit number: 14 since 2025   General:  Reason for visit: L elbow  Referred by: Hilda Patton PA-C    Current Problem  1. Closed fracture of supracondylar humerus, right, initial encounter  Follow Up In Occupational Therapy        Insurance Type: Payor: MEDICARE / Plan: MEDICARE PART A AND B / Product Type: *No Product type* /     Precautions: per post op protocol     Medical History Form: Reviewed (scanned into chart)    Subjective: I have been working on the finger exercises everyday and have used the splint 10 out of the last 14 days.    Chief Complaint: L arm/hand  BASILIO: Fall   DOI: 05/12/2024  DOS: 05/30/2024 ORIF L elbow    Hand Dominance: Right    Current Condition since injury:   better      PAIN     Location: L elbow and hand  Intensity: 0/10   Description: sharp  Aggravating Factors:  functional use  Relieving Factors:  Rest     Relevant Information (PMH & Previous Tests/Imaging): reviewed in chart     Prior Level of Function (PLOF)  Exercise/Physical Activity: jigsaw puzzles  Work/School: retired  Current ADL/IADL Status: independent with homemaking     Patients Living Environment: Reviewed and no concern    Primary Language: English    Pt goals for therapy: improve functional use of L hand for cooking, cleaning, dressing, jigsaw puzzles, and various household chores    Red Flags: Do you have any of the following? No  Fever/chills, unexplained  weight changes, dizziness/fainting, unexplained change in bowel or bladder functions, unexplained malaise or muscle weakness, night pain/sweats, numbness or tingling    Objective:     Distance from tips to palm -; MCP/PIP/DIP flexion ; achieved 1 cm post treatment for all digits  2nd: 0 cm; 75  /100/75   - post treatment 75 ; 0 cm    3rd: 1.5 cm   ;  75  /100/70  - post treatment 75 ; 1.5 cm    4th: 1 cm  ;  77 /105/40 - post treatment 75; 1.5 cm    5th: 1.5 cm  ; 75 /102 /40  - post treatment 72  ; 1.5 cm   Thumb opposition to tip of 4th   Wrist extension/flexion 75 /62   Wrist ulnar/radial deviation 20/10  Forearm pronation/supination 70 /75    Elbow extension/flexion: 23 -115   ;  passive post treatment    strength: 50#/15#   level 3 was 16#   Lateral pinch: 12#/4#   Physical Observation: significant limitations with elbow and digit range of motion, MCPs most limited, extensor habitus posture of hand    Edema: moderate    Sensory: no complaints of sensory issues     Good rehab potential    Outcome Measures:  Quick DASH: 22.5    EDUCATION: home exercise program, plan of care, activity modifications, pain management, and injury pathology     Goals:  Active       OT Goals       OT Goal 1 (Progressing)       Start:  08/02/24    Expected End:  04/18/25       Achieve tips to palm for grasping objects in hand.         OT Goal 2 (Progressing)       Start:  08/02/24    Expected End:  04/18/25       Improve elbow flexion to 120 degrees for dressing tasks.         OT Goal 3 (Progressing)       Start:  08/02/24    Expected End:  04/18/25       Improve elbow extension to 15 degrees for reaching to grasp objects.         OT Goal 4 (Progressing)       Start:  08/02/24    Expected End:  04/18/25       Improve Quick DASH to 15%.           OT Goal 5 (Progressing)       Start:  08/02/24    Expected End:  04/18/25       Improve  strength to 70% of unaffected side for opening jars.           Plan of care was developed  with input and agreement by the patient.    Treatments:     Heating pad applied to circumferential hand.   Therapeutic Exercise:   0 min  HEP education and completion: MCP flexion stretch, IP flexion stretch, composite digit flexion stretch, prayer stretch, wrist flexion stretch, and forearm pronation stretch.  Place and hold into end range digit flexion.   Sponge pickup with hand gripper on light resistance   Sponge pickup with red resistance clip.  Intrinsic slide with foam piece. Did not complete today  Putty pinching and gripping exercises on table top.        Manual Therapy    40 min  Therapist performed manual digit flexion and intrinsic stretches.  Therapist performed A/P joint mobilization combined with MCP flexion.  Therapist positioned MCPs in end range flexion with patient making active fist.  Therapist performed elbow distraction combined with extension stretch.  Therapist performed trigger point release and IASTM along elbow flexors.    Assessment:     strength improved. Focused on soft tissue mobilization and manual stretches. Patient reports good compliance with splint wear schedule and completion of home program.     Plan:   Patient to follow up in two week.       Felipe Lee, OT

## 2025-04-18 ENCOUNTER — APPOINTMENT (OUTPATIENT)
Dept: PRIMARY CARE | Facility: CLINIC | Age: 80
End: 2025-04-18
Payer: MEDICARE

## 2025-04-18 DIAGNOSIS — E53.8 B12 DEFICIENCY: ICD-10-CM

## 2025-04-18 DIAGNOSIS — Z00.00 HEALTH CARE MAINTENANCE: Primary | ICD-10-CM

## 2025-04-18 PROCEDURE — 96372 THER/PROPH/DIAG INJ SC/IM: CPT | Performed by: INTERNAL MEDICINE

## 2025-04-18 RX ORDER — CYANOCOBALAMIN 1000 UG/ML
1000 INJECTION, SOLUTION INTRAMUSCULAR; SUBCUTANEOUS ONCE
Status: COMPLETED | OUTPATIENT
Start: 2025-04-18 | End: 2025-04-18

## 2025-04-18 RX ADMIN — CYANOCOBALAMIN 1000 MCG: 1000 INJECTION, SOLUTION INTRAMUSCULAR; SUBCUTANEOUS at 13:27

## 2025-04-25 ENCOUNTER — TREATMENT (OUTPATIENT)
Dept: OCCUPATIONAL THERAPY | Facility: HOSPITAL | Age: 80
End: 2025-04-25
Payer: MEDICARE

## 2025-04-25 DIAGNOSIS — S42.411A CLOSED FRACTURE OF SUPRACONDYLAR HUMERUS, RIGHT, INITIAL ENCOUNTER: Primary | ICD-10-CM

## 2025-04-25 PROCEDURE — 97140 MANUAL THERAPY 1/> REGIONS: CPT | Mod: GO | Performed by: OCCUPATIONAL THERAPIST

## 2025-04-25 PROCEDURE — 97110 THERAPEUTIC EXERCISES: CPT | Mod: GO | Performed by: OCCUPATIONAL THERAPIST

## 2025-04-25 NOTE — PROGRESS NOTES
Occupational Therapy  Occupational Therapy Orthopedic Treatment Note    Patient Name: Gertrudis Cottrell  MRN: [id number]  Today's Date: 4/25/2025 1/9/25 - J - 2025 VERIFIED - MEDICARE A/B - NO AUTH / $257 DEDUCT MET / 80% COINS / $0 used PT/ST 2025. [employer]  SUPP - PLAN G / PER RTE  Insurance Type: Payor: MEDICARE / Plan: MEDICARE PART A AND B / Product Type: *No Product type* /   Time:  Time Calculation  Start Time: 0840  Stop Time: 0925  Time Calculation (min): 45 min  OT Therapeutic Procedures Time Entry  Manual Therapy Time Entry: 35  Therapeutic Exercise Time Entry: 5         Insurance:  Visit number: 15 since 2025   General:  Reason for visit: L elbow  Referred by: Hilda Patton PA-C    Current Problem  No diagnosis found.    Insurance Type: Payor: MEDICARE / Plan: MEDICARE PART A AND B / Product Type: *No Product type* /     Precautions: per post op protocol     Medical History Form: Reviewed (scanned into chart)    Subjective: I have had a couple painful weeks. My elbow has been sore.     Chief Complaint: L arm/hand  BASILIO: Fall   DOI: 05/12/2024  DOS: 05/30/2024 ORIF L elbow    Hand Dominance: Right    Current Condition since injury:   better      PAIN     Location: L elbow and hand  Intensity: 0/10   Description: sharp  Aggravating Factors:  functional use  Relieving Factors:  Rest     Relevant Information (PMH & Previous Tests/Imaging): reviewed in chart     Prior Level of Function (PLOF)  Exercise/Physical Activity: jigsaw puzzles  Work/School: retired  Current ADL/IADL Status: independent with homemaking     Patients Living Environment: Reviewed and no concern    Primary Language: English    Pt goals for therapy: improve functional use of L hand for cooking, cleaning, dressing, jigsaw puzzles, and various household chores    Red Flags: Do you have any of the following? No  Fever/chills, unexplained weight changes, dizziness/fainting, unexplained change in bowel or bladder functions, unexplained malaise  or muscle weakness, night pain/sweats, numbness or tingling    Objective:     Distance from tips to palm -; MCP/PIP/DIP flexion ; achieved 1 cm post treatment for all digits  2nd: 0 cm; 75  /100/75   - post treatment 75 ; 0 cm    3rd: 1.5 cm   ;  75  /100/70  - post treatment 75 ; 1.5 cm    4th: 1 cm  ;  77 /105/40 - post treatment 75; 1.5 cm    5th: 1.5 cm  ; 75 /102 /40  - post treatment 72  ; 1.5 cm   Thumb opposition to tip of 4th   Wrist extension/flexion 75 /62   Wrist ulnar/radial deviation 20/10  Forearm pronation/supination 70 /75    Elbow extension/flexion: 23 -115   ;  passive post treatment    strength: 50#/22# was 15#    Lateral pinch: 12#/4#   Physical Observation: significant limitations with elbow and digit range of motion, MCPs most limited, extensor habitus posture of hand    Edema: moderate    Sensory: no complaints of sensory issues     Good rehab potential    Outcome Measures:  Quick DASH: 22.5    EDUCATION: home exercise program, plan of care, activity modifications, pain management, and injury pathology     Goals:  Active       OT Goals       OT Goal 1 (Progressing)       Start:  08/02/24    Expected End:  04/18/25       Achieve tips to palm for grasping objects in hand.         OT Goal 2 (Progressing)       Start:  08/02/24    Expected End:  04/18/25       Improve elbow flexion to 120 degrees for dressing tasks.         OT Goal 3 (Progressing)       Start:  08/02/24    Expected End:  04/18/25       Improve elbow extension to 15 degrees for reaching to grasp objects.         OT Goal 4 (Progressing)       Start:  08/02/24    Expected End:  04/18/25       Improve Quick DASH to 15%.           OT Goal 5 (Progressing)       Start:  08/02/24    Expected End:  04/18/25       Improve  strength to 70% of unaffected side for opening jars.           Plan of care was developed with input and agreement by the patient.    Treatments:     Heating pad applied to circumferential hand.    Therapeutic Exercise:   5 min  HEP education and completion: MCP flexion stretch, IP flexion stretch, composite digit flexion stretch, prayer stretch, wrist flexion stretch, and forearm pronation stretch.  Place and hold into end range digit flexion.   Bead pickup with in hand manipulation and transfer.  Sponge pickup with hand gripper on light resistance  Did not complete today  Sponge pickup with red resistance clip. Did not complete today  Intrinsic slide with foam piece. Did not complete today  Putty pinching and gripping exercises on table top. Did not complete today        Manual Therapy    35 min  Therapist performed manual digit flexion and intrinsic stretches.  Therapist performed A/P joint mobilization combined with MCP flexion.  Therapist positioned MCPs in end range flexion with patient making active fist.  Therapist performed elbow distraction combined with extension stretch.  Therapist performed trigger point release and IASTM along elbow flexors.    Assessment:     strength improved today. Patient to work on dexterity /tip pinch with bead manipulation tasks. Patient to continue increasing functional use as tolerated.    Plan:   Patient to follow up in two week.       Felipe Lee, OT

## 2025-05-08 ENCOUNTER — TREATMENT (OUTPATIENT)
Dept: OCCUPATIONAL THERAPY | Facility: HOSPITAL | Age: 80
End: 2025-05-08
Payer: MEDICARE

## 2025-05-08 DIAGNOSIS — S42.411A CLOSED FRACTURE OF SUPRACONDYLAR HUMERUS, RIGHT, INITIAL ENCOUNTER: Primary | ICD-10-CM

## 2025-05-08 PROCEDURE — 97110 THERAPEUTIC EXERCISES: CPT | Mod: GO | Performed by: OCCUPATIONAL THERAPIST

## 2025-05-08 PROCEDURE — 97140 MANUAL THERAPY 1/> REGIONS: CPT | Mod: GO | Performed by: OCCUPATIONAL THERAPIST

## 2025-05-08 NOTE — PROGRESS NOTES
Occupational Therapy  Occupational Therapy Orthopedic Treatment Note    Patient Name: Gertrudis Cottrell  MRN: [id number]  Today's Date: 5/8/2025 1/9/25 - J - 2025 VERIFIED - MEDICARE A/B - NO AUTH / $257 DEDUCT MET / 80% COINS / $0 used PT/ST 2025. [employer]  SUPP - PLAN G / PER RTE  Insurance Type: Payor: MEDICARE / Plan: MEDICARE PART A AND B / Product Type: *No Product type* /   Time:   Time:  Time Calculation  Start Time: 0858  Stop Time: 0943  Time Calculation (min): 45 min  OT Therapeutic Procedures Time Entry  Manual Therapy Time Entry: 35  Therapeutic Exercise Time Entry: 5      Insurance Type: Payor: MEDICARE / Plan: MEDICARE PART A AND B / Product Type: *No Product type* /         Insurance:  Visit number: 16 since 2025   General:  Reason for visit: L elbow  Referred by: Hilda Patton PA-C    Current Problem  No diagnosis found.    Insurance Type: Payor: MEDICARE / Plan: MEDICARE PART A AND B / Product Type: *No Product type* /     Precautions: per post op protocol     Medical History Form: Reviewed (scanned into chart)    Subjective: I have had a couple painful weeks. My elbow has been sore.     Chief Complaint: L arm/hand  BASILIO: Fall   DOI: 05/12/2024  DOS: 05/30/2024 ORIF L elbow    Hand Dominance: Right    Current Condition since injury:   better      PAIN     Location: L elbow and hand  Intensity: 0/10   Description: sharp  Aggravating Factors:  functional use  Relieving Factors:  Rest     Relevant Information (PMH & Previous Tests/Imaging): reviewed in chart     Prior Level of Function (PLOF)  Exercise/Physical Activity: jigsaw puzzles  Work/School: retired  Current ADL/IADL Status: independent with homemaking     Patients Living Environment: Reviewed and no concern    Primary Language: English    Pt goals for therapy: improve functional use of L hand for cooking, cleaning, dressing, jigsaw puzzles, and various household chores    Red Flags: Do you have any of the following? No  Fever/chills,  unexplained weight changes, dizziness/fainting, unexplained change in bowel or bladder functions, unexplained malaise or muscle weakness, night pain/sweats, numbness or tingling    Objective:     Distance from tips to palm -; MCP/PIP/DIP flexion ; achieved 1 cm post treatment for all digits  2nd: 0 cm; 75  /100/75   - post treatment 75 ; 0 cm    3rd: 1.5 cm   ;  75  /100/70  - post treatment 75 ; 1.5 cm    4th: 1 cm  ;  77 /105/40 - post treatment 75; 1.5 cm    5th: 1.5 cm  ; 75 /102 /40  - post treatment 72  ; 1.5 cm   Thumb opposition to tip of 4th   Wrist extension/flexion 75 /62   Wrist ulnar/radial deviation 20/10  Forearm pronation/supination 70 /75    Elbow extension/flexion: 23 -115   ;  passive post treatment    strength: 50#/22#    Lateral pinch: 12#/4#   Physical Observation: significant limitations with elbow and digit range of motion, MCPs most limited, extensor habitus posture of hand    Edema: moderate    Sensory: no complaints of sensory issues     Good rehab potential    Outcome Measures:  Quick DASH: 22.5    EDUCATION: home exercise program, plan of care, activity modifications, pain management, and injury pathology     Goals:  Active       OT Goals       OT Goal 1 (Progressing)       Start:  08/02/24    Expected End:  04/18/25       Achieve tips to palm for grasping objects in hand.         OT Goal 2 (Progressing)       Start:  08/02/24    Expected End:  04/18/25       Improve elbow flexion to 120 degrees for dressing tasks.         OT Goal 3 (Progressing)       Start:  08/02/24    Expected End:  04/18/25       Improve elbow extension to 15 degrees for reaching to grasp objects.         OT Goal 4 (Progressing)       Start:  08/02/24    Expected End:  04/18/25       Improve Quick DASH to 15%.           OT Goal 5 (Progressing)       Start:  08/02/24    Expected End:  04/18/25       Improve  strength to 70% of unaffected side for opening jars.           Plan of care was developed with  input and agreement by the patient.    Treatments:     Heating pad applied to circumferential hand.   Therapeutic Exercise:   5 min  HEP education and completion: MCP flexion stretch, IP flexion stretch, composite digit flexion stretch, prayer stretch, wrist flexion stretch, and forearm pronation stretch.  Place and hold into end range digit flexion.   Bead pickup with in hand manipulation and transfer.  Lateral pinch with green resistance clip.  Sponge pickup with hand gripper on light resistance  Did not complete today  Intrinsic slide with foam piece. Did not complete today  Putty pinching and gripping exercises on table top. Did not complete today        Manual Therapy    35 min  Therapist performed manual digit flexion and intrinsic stretches.  Therapist performed A/P joint mobilization combined with MCP flexion.  Therapist positioned MCPs in end range flexion with patient making active fist.  Therapist performed elbow distraction combined with extension stretch.  Therapist performed trigger point release and IASTM along elbow flexors.    Assessment:      Continued working on end range digit and elbow motion. Upgraded resistance clip to green. Patient to follow up in two week.     Plan:   Patient to follow up in two week.       Felipe Lee, OT

## 2025-05-09 ENCOUNTER — APPOINTMENT (OUTPATIENT)
Dept: OCCUPATIONAL THERAPY | Facility: HOSPITAL | Age: 80
End: 2025-05-09
Payer: MEDICARE

## 2025-05-16 ENCOUNTER — APPOINTMENT (OUTPATIENT)
Dept: PRIMARY CARE | Facility: CLINIC | Age: 80
End: 2025-05-16
Payer: MEDICARE

## 2025-05-16 VITALS — HEART RATE: 72 BPM | RESPIRATION RATE: 12 BRPM

## 2025-05-16 DIAGNOSIS — E53.8 B12 DEFICIENCY: ICD-10-CM

## 2025-05-16 DIAGNOSIS — Z00.00 HEALTH CARE MAINTENANCE: ICD-10-CM

## 2025-05-16 DIAGNOSIS — D51.0 PERNICIOUS ANEMIA: Primary | ICD-10-CM

## 2025-05-16 NOTE — PROGRESS NOTES
Subjective   Patient ID: Tricia Cottrell is a 79 y.o. female who presents for No chief complaint on file..    HPI follow-up visit for vitamin B12 injection    Review of Systems    Objective   There were no vitals taken for this visit.    Physical Exam vital signs noted alert and oriented x 3 NCAT breathing unlabored not otherwise examined    Area prepped on right deltoid tricep area and 1 cc of cyanocobalamin injected without incident bandage applied    Lot [id number]  Expiration date 1026    Assessment/Plan     Impression pernicious anemia  Plan continue to observe the area of repeat in 1 month and eventually recheck on blood work

## 2025-05-23 ENCOUNTER — TREATMENT (OUTPATIENT)
Dept: OCCUPATIONAL THERAPY | Facility: HOSPITAL | Age: 80
End: 2025-05-23
Payer: MEDICARE

## 2025-05-23 DIAGNOSIS — S42.411A CLOSED FRACTURE OF SUPRACONDYLAR HUMERUS, RIGHT, INITIAL ENCOUNTER: Primary | ICD-10-CM

## 2025-05-23 PROCEDURE — 97140 MANUAL THERAPY 1/> REGIONS: CPT | Mod: GO | Performed by: OCCUPATIONAL THERAPIST

## 2025-05-23 PROCEDURE — 97110 THERAPEUTIC EXERCISES: CPT | Mod: GO | Performed by: OCCUPATIONAL THERAPIST

## 2025-05-23 NOTE — PROGRESS NOTES
Occupational Therapy  Occupational Therapy Orthopedic Treatment Note    Patient Name: Gertrudis Cottrell  MRN: [id number]  Today's Date: 5/23/2025 1/9/25 - J - 2025 VERIFIED - MEDICARE A/B - NO AUTH / $257 DEDUCT MET / 80% COINS / $0 used PT/ST 2025. [employer] SUPP - PLAN G / PER RTE  Time:  Time Calculation  Start Time: 0845  Stop Time: 0930  Time Calculation (min): 45 min  OT Therapeutic Procedures Time Entry  Manual Therapy Time Entry: 35  Therapeutic Exercise Time Entry: 5           Insurance:  Visit number: 17 since 2025   General:  Reason for visit: L elbow  Referred by: Hilda Patton PA-C    Current Problem  1. Closed fracture of supracondylar humerus, right, initial encounter            Insurance Type: Payor: MEDICARE / Plan: MEDICARE PART A AND B / Product Type: *No Product type* /     Precautions: per post op protocol     Medical History Form: Reviewed (scanned into chart)    Subjective:  I have been working on the exercises. I am using it more.    Chief Complaint: L arm/hand  BASILIO: Fall   DOI: 05/12/2024  DOS: 05/30/2024 ORIF L elbow    Hand Dominance: Right    Current Condition since injury:   better      PAIN     Location: L elbow and hand  Intensity: 0/10   Description: sharp  Aggravating Factors:  functional use  Relieving Factors:  Rest     Relevant Information (PMH & Previous Tests/Imaging): reviewed in chart     Prior Level of Function (PLOF)  Exercise/Physical Activity: jigsaw puzzles  Work/School: retired  Current ADL/IADL Status: independent with homemaking     Patients Living Environment: Reviewed and no concern    Primary Language: English    Pt goals for therapy: improve functional use of L hand for cooking, cleaning, dressing, jigsaw puzzles, and various household chores    Red Flags: Do you have any of the following? No  Fever/chills, unexplained weight changes, dizziness/fainting, unexplained change in bowel or bladder functions, unexplained malaise or muscle weakness, night pain/sweats,  numbness or tingling    Objective:     Distance from tips to palm -; MCP/PIP/DIP flexion ; achieved 1 cm post treatment for all digits  2nd: 0 cm; 77 was 75  /100/75      3rd: 1.5 cm   ;  80 was 75  /100/70    4th: 1 cm  ;  80 was 77 /105/40   5th: 1.5 cm  ; 77 was 75 /102 /40   Thumb opposition to tip of 4th   Wrist extension/flexion 75 /62   Wrist ulnar/radial deviation 20/10  Forearm pronation/supination 70 /75    Elbow extension/flexion: 23 -115   ;  passive post treatment    strength: 45#/22#    Lateral pinch: 12#/4#   Physical Observation: significant limitations with elbow and digit range of motion, MCPs most limited, extensor habitus posture of hand    Edema: moderate    Sensory: no complaints of sensory issues     Good rehab potential    Outcome Measures:  Quick DASH: 22.5    EDUCATION: home exercise program, plan of care, activity modifications, pain management, and injury pathology     Goals:  Active       OT Goals       OT Goal 1 (Progressing)       Start:  08/02/24    Expected End:  04/18/25       Achieve tips to palm for grasping objects in hand.         OT Goal 2 (Progressing)       Start:  08/02/24    Expected End:  04/18/25       Improve elbow flexion to 120 degrees for dressing tasks.         OT Goal 3 (Progressing)       Start:  08/02/24    Expected End:  04/18/25       Improve elbow extension to 15 degrees for reaching to grasp objects.         OT Goal 4 (Progressing)       Start:  08/02/24    Expected End:  04/18/25       Improve Quick DASH to 15%.           OT Goal 5 (Progressing)       Start:  08/02/24    Expected End:  04/18/25       Improve  strength to 70% of unaffected side for opening jars.           Plan of care was developed with input and agreement by the patient.    Treatments:     Heating pad applied to circumferential hand.   Therapeutic Exercise:   5 min  HEP education and completion: MCP flexion stretch, IP flexion stretch, composite digit flexion stretch, prayer  stretch, wrist flexion stretch, and forearm pronation stretch.  Bead pickup with in hand manipulation and transfer.  Lateral pinch with green resistance clip.      Manual Therapy    35 min  Therapist performed manual digit flexion and intrinsic stretches.  Therapist performed A/P joint mobilization combined with MCP flexion.  Therapist positioned MCPs in end range flexion with patient making active fist.  Therapist performed elbow distraction combined with extension stretch.  Therapist performed trigger point release and IASTM along elbow flexors.    Assessment:    MCP flexion improve.       Plan:   Patient to follow up in two week.       Felipe Lee, OT

## 2025-06-04 ENCOUNTER — OFFICE VISIT (OUTPATIENT)
Dept: ORTHOPEDIC SURGERY | Facility: CLINIC | Age: 80
End: 2025-06-04
Payer: MEDICARE

## 2025-06-04 ENCOUNTER — HOSPITAL ENCOUNTER (OUTPATIENT)
Dept: RADIOLOGY | Facility: CLINIC | Age: 80
Discharge: HOME | End: 2025-06-04
Payer: MEDICARE

## 2025-06-04 DIAGNOSIS — S42.411A CLOSED FRACTURE OF SUPRACONDYLAR HUMERUS, RIGHT, INITIAL ENCOUNTER: Primary | ICD-10-CM

## 2025-06-04 DIAGNOSIS — S42.411A CLOSED FRACTURE OF SUPRACONDYLAR HUMERUS, RIGHT, INITIAL ENCOUNTER: ICD-10-CM

## 2025-06-04 PROCEDURE — 99212 OFFICE O/P EST SF 10 MIN: CPT | Performed by: ORTHOPAEDIC SURGERY

## 2025-06-04 PROCEDURE — 1160F RVW MEDS BY RX/DR IN RCRD: CPT | Performed by: ORTHOPAEDIC SURGERY

## 2025-06-04 PROCEDURE — 1159F MED LIST DOCD IN RCRD: CPT | Performed by: ORTHOPAEDIC SURGERY

## 2025-06-04 PROCEDURE — 1036F TOBACCO NON-USER: CPT | Performed by: ORTHOPAEDIC SURGERY

## 2025-06-04 PROCEDURE — 73070 X-RAY EXAM OF ELBOW: CPT | Mod: LT

## 2025-06-04 PROCEDURE — 99214 OFFICE O/P EST MOD 30 MIN: CPT | Performed by: ORTHOPAEDIC SURGERY

## 2025-06-04 PROCEDURE — 73070 X-RAY EXAM OF ELBOW: CPT | Mod: LEFT SIDE | Performed by: STUDENT IN AN ORGANIZED HEALTH CARE EDUCATION/TRAINING PROGRAM

## 2025-06-04 ASSESSMENT — PAIN - FUNCTIONAL ASSESSMENT
PAIN_FUNCTIONAL_ASSESSMENT: NO/DENIES PAIN
PAIN_FUNCTIONAL_ASSESSMENT: NO/DENIES PAIN

## 2025-06-04 NOTE — PROGRESS NOTES
History of Present Illness   Gertrudis Cottrell is a 79 y.o. female presenting today for post-op check from ORIF left distal humerus fracture on 5/30/2024.  She had significant motion issues and ulnar neuropathy with hand stiffness.  She has been seeing Dr. Wright.  Largely her nerve issues have improved significantly.  She has way better hand motion.  Still some difficulty with flexion of the MCP joints.  She has had multiple ulnar investigatory studies including EMG and ultrasound.  She has no area of constriction or stenosis.  She has been treated conservatively by Dr. Wright and has got a lot better.  She is also working on elbow flexion and extension with a dynamic splint.  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 Sig Documenting Provider Last Dose Status   atorvastatin (Lipitor) 10 mg tablet [id number] Yes TAKE 1 TABLET (10 MG) BY MOUTH ONCE DAILY AT BEDTIME. Ezequiel Briseno MD  Active   calcium carbonate/vitamin D3 (CALCIUM + D ORAL) [id number] Yes Take by mouth. Historical Provider, MD  Active   cyanocobalamin (Vitamin B-12) 1,000 mcg/mL injection [id number] Yes INJECT 1ML IN THE MUSCLE ONCE A MONTH Ezequiel Briseno MD  Active   cyanocobalamin (Vitamin B-12) injection 1,000 mcg [id number]   Ezequiel Briseno MD  Active   cyanocobalamin (Vitamin B-12) injection 1,000 mcg [id number]   Ezequiel Briseno MD  Active   cyanocobalamin (Vitamin B-12) injection 1,000 mcg [id number]   Ezequiel Briseno MD  Active   cyanocobalamin (Vitamin B-12) injection 1,000 mcg [id number]   Ezequiel Briseno MD  Active   levothyroxine (Synthroid, Levoxyl) 100 mcg tablet [id number] Yes TAKE 1 TABLET BY MOUTH ONCE  DAILY IN THE MORNING. TAKE BEFORE MEALS. Ezequiel Briseno MD  Active   losartan (Cozaar) 25 mg tablet [id number] Yes TAKE 1 TABLET BY MOUTH EVERY DAY Ezequiel Briseno MD  Active                    Allergies   Allergen Reactions    Ciprofloxacin Diarrhea       Social History     Socioeconomic History    Marital status:      Spouse name: Not on file    Number of children: Not on file    Years of education: Not on file    Highest education level: Not on file   Occupational History    Not on file   Tobacco Use    Smoking status: Never    Smokeless tobacco: Never   Vaping Use    Vaping status: Never Used   Substance and Sexual Activity    Alcohol use: Yes     Alcohol/week: 3.0 standard drinks of alcohol     Types: 3 Glasses of wine per week    Drug use: Never    Sexual activity: Yes     Partners: Male     Birth control/protection: Post-menopausal   Other Topics Concern    Not on file   Social History Narrative    Not on file     Social Drivers of Health     Financial Resource Strain: Not on file   Food Insecurity: Not on file   Transportation Needs: Not on file   Physical Activity: Not on file   Stress: Not on file   Social Connections: Not on file   Intimate Partner Violence: Not on file   Housing Stability: Not on file       Past Surgical History:   Procedure Laterality Date    BREAST BIOPSY  1980s    Negative    HUMERUS FRACTURE SURGERY      HYSTERECTOMY  1997       Physical Exam:  Gen: The pt is A&Ox3, NAD, and appear state age and weight  Psychiatric: mood and affect are appropriate   Eyes: sclera are white, EOM grossly intact  ENT: MMM  Neck: supple, thyroid is midline  Respiratory: respirations are nonlabored, chest rise symmetric  CV: rate is regular by palpation of distal pulses  Abdomen: nondistended   Integument: no obvious cutaneous lesions noted. No signs of lymphangitis. No signs of systemic edema.   MSK: Left elbow surgical incision well healing without edema, erythema, drainage, or other s/sx of  infection.  Range of motion is about 15 degrees lacking of full extension and flexing to 120 degrees.  She also has improvement in her metacarpal phalangeal joint contractures in digits 2 3 and 5 most predominantly.  Her sensory exam is stable.  Overall she has made great improvement in her motion.    Imaging:  I personally reviewed multiple views of the left elbow were obtained in the office today demonstrate maintenance of reduction, interval healing, and a stable position of the hardware.  There is no sign of posttraumatic arthritis.  Stable anterior ossification fragment.     Assessment   79 y.o. female post-op from open reduction internal fixation left distal humerus fracture on 5/30/2024.  She may have hit a plateau with her motion recovery but is overall happy with the amount of functional return.  She is getting continue to see Dr. Wright and would like to see me back in 6 months.  Will get 2 views left elbow at that time.  At this point do not think any additional surgical intervention would meaningfully help her motion or her nerve recovery.  Gabapentin was no longer working for her and she has weaned off.    Natural History reviewed. All questions answered. The patient was in agreement with the plan.      **This note was created using voice recognition software and was not corrected for typographical or grammatical errors.**   Statement Selected

## 2025-06-06 ENCOUNTER — TREATMENT (OUTPATIENT)
Dept: OCCUPATIONAL THERAPY | Facility: HOSPITAL | Age: 80
End: 2025-06-06
Payer: MEDICARE

## 2025-06-06 DIAGNOSIS — S42.411A CLOSED FRACTURE OF SUPRACONDYLAR HUMERUS, RIGHT, INITIAL ENCOUNTER: Primary | ICD-10-CM

## 2025-06-06 PROCEDURE — 97140 MANUAL THERAPY 1/> REGIONS: CPT | Mod: GO | Performed by: OCCUPATIONAL THERAPIST

## 2025-06-06 NOTE — PROGRESS NOTES
Occupational Therapy  Occupational Therapy Orthopedic Discharge Note    Patient Name: Gertrudis Cottrell  MRN: [id number]  Today's Date: 6/6/2025 1/9/25 - J - 2025 VERIFIED - MEDICARE A/B - NO AUTH / $257 DEDUCT MET / 80% COINS / $0 used PT/ST 2025. [employer] SUPP - PLAN G / PER RTE  Time:         Insurance:  Visit number: 18 since 2025   General:  Reason for visit: L elbow  Referred by: Hilda Patton PA-C    Current Problem  1. Closed fracture of supracondylar humerus, right, initial encounter              Insurance Type: Payor: MEDICARE / Plan: MEDICARE PART A AND B / Product Type: *No Product type* /     Precautions: per post op protocol     Medical History Form: Reviewed (scanned into chart)    Subjective:  I am working on the elbow and my finger motion. I am doing more with my hand.     Chief Complaint: L arm/hand  BASILIO: Fall   DOI: 05/12/2024  DOS: 05/30/2024 ORIF L elbow    Hand Dominance: Right    Current Condition since injury:   better      PAIN     Location: L elbow and hand  Intensity: 0/10   Description: sharp  Aggravating Factors:  functional use  Relieving Factors:  Rest     Relevant Information (PMH & Previous Tests/Imaging): reviewed in chart     Prior Level of Function (PLOF)  Exercise/Physical Activity: jigsaw puzzles  Work/School: retired  Current ADL/IADL Status: independent with homemaking     Patients Living Environment: Reviewed and no concern    Primary Language: English    Pt goals for therapy: improve functional use of L hand for cooking, cleaning, dressing, jigsaw puzzles, and various household chores    Red Flags: Do you have any of the following? No  Fever/chills, unexplained weight changes, dizziness/fainting, unexplained change in bowel or bladder functions, unexplained malaise or muscle weakness, night pain/sweats, numbness or tingling    Objective:     Distance from tips to palm -; MCP/PIP/DIP flexion ; achieved 1 cm post treatment for all digits  2nd: 0 cm; 77  /100/75      3rd: 1.5  cm   ;  80  /100/70    4th: 1 cm  ;  80   /105/40   5th: 1.5 cm  ; 80 was 77  /102 /40   Thumb opposition to tip of 4th   Wrist extension/flexion 75 /62   Wrist ulnar/radial deviation 20/10  Forearm pronation/supination 70 /75    Elbow extension/flexion: 23 -120 was 115   ;  passive post treatment    strength: 45#/22#    Lateral pinch: 12#/4#   Physical Observation: significant limitations with elbow and digit range of motion, MCPs most limited, extensor habitus posture of hand    Edema: moderate    Sensory: no complaints of sensory issues     Good rehab potential    Outcome Measures:  Quick DASH: 22.5    EDUCATION: home exercise program, plan of care, activity modifications, pain management, and injury pathology     Goals:  Active       OT Goals       OT Goal 1 (Progressing)       Start:  08/02/24    Expected End:  04/18/25       Achieve tips to palm for grasping objects in hand.         OT Goal 2 (Progressing)       Start:  08/02/24    Expected End:  04/18/25       Improve elbow flexion to 120 degrees for dressing tasks.         OT Goal 3 (Progressing)       Start:  08/02/24    Expected End:  04/18/25       Improve elbow extension to 15 degrees for reaching to grasp objects.         OT Goal 4 (Progressing)       Start:  08/02/24    Expected End:  04/18/25       Improve Quick DASH to 15%.           OT Goal 5 (Progressing)       Start:  08/02/24    Expected End:  04/18/25       Improve  strength to 70% of unaffected side for opening jars.           Plan of care was developed with input and agreement by the patient.    Treatments:     Heating pad applied to circumferential hand.   Therapeutic Exercise:   0 min did not complete today  HEP education and completion: MCP flexion stretch, IP flexion stretch, composite digit flexion stretch, prayer stretch, wrist flexion stretch, and forearm pronation stretch.      Manual Therapy    35 min  Therapist performed manual digit flexion and intrinsic  stretches.  Therapist performed A/P joint mobilization combined with MCP flexion.  Therapist positioned MCPs in end range flexion with patient making active fist.  Therapist performed elbow distraction combined with extension stretch.  Therapist performed trigger point release and IASTM along elbow flexors.    Assessment:      Patient had slight improvement in SF flexion at MCP and with elbow flexion. Patient reports good  functional use of R hand/elbow at home. Patient discharged to home program. Patient to follow up with any further questions or concerns.     Plan:   Patient to follow up in two week.       Felipe Lee, OT

## 2025-06-10 ENCOUNTER — TELEPHONE (OUTPATIENT)
Dept: OBSTETRICS AND GYNECOLOGY | Facility: CLINIC | Age: 80
End: 2025-06-10
Payer: COMMERCIAL

## 2025-06-11 DIAGNOSIS — Z78.0 ASYMPTOMATIC POSTMENOPAUSAL STATE: ICD-10-CM

## 2025-06-11 DIAGNOSIS — Z13.820 SCREENING FOR OSTEOPOROSIS: Primary | ICD-10-CM

## 2025-06-11 NOTE — TELEPHONE ENCOUNTER
LRA -2.20.2025 w/DEVAN  Next Annual APT Sched- n/a  Last Mammogram- 3.19.2025  Last Dexa Scan-3.13.2023        PT called into the office today at 946am asking for LSM to add her Dexa Scan Requisition to the sys. PT agreed to letting her Mychart notifying her.       Bone density order placed.

## 2025-06-13 ENCOUNTER — OFFICE VISIT (OUTPATIENT)
Dept: ORTHOPEDIC SURGERY | Facility: CLINIC | Age: 80
End: 2025-06-13
Payer: MEDICARE

## 2025-06-13 VITALS — WEIGHT: 138 LBS | HEIGHT: 64 IN | BODY MASS INDEX: 23.56 KG/M2

## 2025-06-13 DIAGNOSIS — M79.642 HAND PAIN, LEFT: Primary | ICD-10-CM

## 2025-06-13 DIAGNOSIS — Z00.00 HEALTH CARE MAINTENANCE: ICD-10-CM

## 2025-06-13 PROCEDURE — 99213 OFFICE O/P EST LOW 20 MIN: CPT | Performed by: STUDENT IN AN ORGANIZED HEALTH CARE EDUCATION/TRAINING PROGRAM

## 2025-06-13 PROCEDURE — 99213 OFFICE O/P EST LOW 20 MIN: CPT

## 2025-06-13 PROCEDURE — 1036F TOBACCO NON-USER: CPT | Performed by: STUDENT IN AN ORGANIZED HEALTH CARE EDUCATION/TRAINING PROGRAM

## 2025-06-13 PROCEDURE — 1159F MED LIST DOCD IN RCRD: CPT | Performed by: STUDENT IN AN ORGANIZED HEALTH CARE EDUCATION/TRAINING PROGRAM

## 2025-06-13 PROCEDURE — 99212 OFFICE O/P EST SF 10 MIN: CPT

## 2025-06-13 PROCEDURE — G2211 COMPLEX E/M VISIT ADD ON: HCPCS | Performed by: STUDENT IN AN ORGANIZED HEALTH CARE EDUCATION/TRAINING PROGRAM

## 2025-06-13 RX ORDER — ATORVASTATIN CALCIUM 10 MG/1
10 TABLET, FILM COATED ORAL NIGHTLY
Qty: 90 TABLET | Refills: 0 | Status: SHIPPED | OUTPATIENT
Start: 2025-06-13

## 2025-06-13 NOTE — PROGRESS NOTES
CHIEF COMPLAINT: Left ulnar nerve sxs   DOI:5/16/24  DOS:  ORIF L Distal humerus fx by Dr. Oates on 5/30/24      HISTORY OF PRESENT ILLNESS    This is a very pleasant 79-year-old female, right-hand-dominant, retired but very active and highly functional lives with her , denies active tobacco use denies diabetes denies anticoagulation use.  Denies significant cardiopulmonary past medical history.  She is presenting as a new patient evaluation for left-sided ulnar nerve symptoms.  She unfortunately sustained a left distal humerus fracture on the above date and was treated with an ORIF by Dr. Oates.    She has done well from her fixation and has regained a lot of her elbow motion however she has struggled with ulnar neuritis after surgery.  She was initially on gabapentin however has weaned off.  She is very happy with her progress that she has made with occupational therapy.  While her strength is not normal she is thrilled with the trend towards improvement.  Her abnormal sensory disturbances are also improving in both severity and frequency.    Interval update 1/31/2025:  Patient return for scheduled follow-up visit to discuss the results of her ultrasound.  She continues to be very happy with the progress that she is making with occupational therapy.  She is mildly frustrated with the lack of terminal extension.  She explains that she is only having paresthesias when attempting maximal extension.  She has no pain at rest.  She is happy with her hand function.    Interval update 6/13/2025:  Patient return for scheduled follow-up visit.  She has been working well with occupational therapy she is very happy with her continued progress.  She has excellent documentation of a log of the level of her pain which is improved dramatically.  Her strength in her intrinsics is returning as well.  Her primary complaint right now is stiffness especially in the morning the small joints of her hand.    PHYSICAL  EXAM    Extremities / Musculoskeletal:                  Left upper extremity:  Well-healed surgical wounds.  No gross deformity.  No active skin lesions.  Elbow flexion extension approximately  near full pronosupination.  No obvious signs of atrophy in the forearm.  Slight narrowing of the palmar width.  Sensation intact in the ulnar nerve distribution with the exception of dysesthesias in the tips of her ring and small finger.  Sensation intact and normal with the median radial nerve distribution.  Modest ulnar clawing. weak but improving digital abduction.  Mildly positive Jeansonne positive Froment's sign.  4- out of 5 FDP to the small finger 4- out of 5 first dorsal interosseous.  Motor intact radial PIN median AIN.  2+ radial warm well-perfused    IMAGING / LABS / EMGs:    Left hand x-ray series obtained today and independent reviewed demonstrating no signs of acute fracture or dislocation.  Normal global alignment.    EMG from 1/7/2025 demonstrating electrophysiologic evidence consistent with a nonlocalizable left ulnar neuropathy with active denervation in the ulnar innervated muscles.  Low ulnar sensory potential amplitude absent dorsal ulnar sensory response and active denervation noted in the flexor digitorum profundus which implicated lesion at approximately takeoff of the FDP.    Neuromuscular ultrasound performed on 1/28/2025:  IMPRESSION:     This is an abnormal and complex neuromuscular ultrasound examination of the left upper extremity.      There was ultrasound evidence of a mild to moderate ulnar neuropathy at the elbow. The location of the lesion was at the distal humerus proximal to retrocondylar groove. The ulnar nerve are immediately adjacent to 2 screws at the medial epicondyle. However, no definite screw or other hardware impingement on the ulnar nerve was observed. The ulnar nerve was in continuity, with no evidence of neuroma or laceration. The ulnar nerve was followed through its  anatomic course in the limb from wrist to axilla and was otherwise normal.     There is an denervation atrophy of the ulnar innervated muscles including deep head of the flexor pollicis brevis, medial flexor digitorum profundus and flexor carpi ulnaris.     In addition there was prominent denervation atrophy of the pronator quadratus muscle.  However this was not accompanied by denervation atrophy of other muscles supplied by the anterior interosseous nerve and may represent a sequelae of prior trauma.     The left elbow joint showed significant degenerative changes, including sclerotic changes, osteophytes, particularly at the medial epicondyle. There is joint widening and increased effusion in the radiocapitellar, ulnar trochlear, and posterior elbow joints. Mild sclerotic changes with effusion was seen in the radial fossa. The olecranon showed screw shadowing, osteophytes, and arthritic changes, with effusion in the olecranon fossa. These findings are consistent with the patient’s history of a distal humerus fracture and prior surgery.     For comparison purposes and to assess for additional pathology, the median nerve was also studied and was normal. There was a mild synovial hypertrophy of the radiocarpal joint.     The other visualized osseous, ligamentous and tendinous structures appeared normal.    No new imaging obtained for today's visit on 6/13/2025    ASSESSMENT:   Ulnar palsy and neuritis following distal humerus fracture treated with ORIF.    We do long discussion regarding my role in this situation.  Her ulnar nerve is certainly affected with weakness and sensory disturbances.  However it should not be lost that she has made phenomenal progress with occupational therapy and is overall very content with her progress.  I think in addition to the electrodiagnostic studies it would be helpful to obtain a neuromuscular ultrasound to evaluate the ulnar nerve through his entire course looking for signs of  stenosis or prestenotic dilation and perhaps any signs of injury.    Interval update 1/28/25:  We have another long discussion regarding the results of the neuromuscular ultrasound of the broader context of ulnar nerve irritation and the results of her prior electrodiagnostic studies.  Fortunately there is no definitive hardware impingement on the nerve or entrapment under the plate.  There is no sign of obvious neuroma or laceration.  We discussed that at baseline she does not have to have any procedure and the fact that she is trending towards continued improvement that is excellent and there are risks of surgery that could delay that her make her situation worse.  I think that it would be reasonable to consider an ulnar nerve neurolysis cubital tunnel release and anterior transposition which could possibly help with the periodic positional neuritis and help with any ischemic issues to the nerve.  We could also consider a Guyon's release in order to remove any potential downstream sites of compression impairing full return of the ulnar intrinsic musculature.  We also discussed the possibility of an AIN end-to-side supercharge.  Based on the time that is gone by as well as the suspected location at the elbow I think would be unrealistic to have any traumatic improvement in these procedures should only be considered if we are desperate for any potential modest improvement.  Currently I think she is doing so well and she is so happy that I would avoid rushing into surgery at this time.    Interval update 6/15/2025:  Patient has made excellent progress and continued recovery of her ulnar nerve issues.  Her intrinsic motor function is returning.  Her sensory disturbances are consolidating to the tips of her ring and small finger.  She should be very proud of the progress that she has made.  I would advise against considering surgery at this point to allow for full recovery on her own.    PLAN:   Continue OT  Consider  over-the-counter hot wax bath or heated gloves first in the morning    Follow-up in: 3 months   XR at next visit: None    The diagnosis and treatment plan were reviewed with the patient. All questions were answered. The patient verbalized understanding of the treatment plan. There were no barriers to understanding identified.     Note dictated with [employer] software.  Completed without full type editing and sent to avoid delay.    Gurvinder Wright M.D.    Department of Orthopaedics  Hand/Upper Extremity  Phone: [phone]  Appt. Phone: [phone]\

## 2025-06-17 ENCOUNTER — APPOINTMENT (OUTPATIENT)
Dept: PRIMARY CARE | Facility: CLINIC | Age: 80
End: 2025-06-17
Payer: COMMERCIAL

## 2025-06-17 DIAGNOSIS — E53.8 B12 DEFICIENCY: Primary | ICD-10-CM

## 2025-06-17 DIAGNOSIS — Z00.00 HEALTH CARE MAINTENANCE: ICD-10-CM

## 2025-06-17 RX ORDER — CYANOCOBALAMIN 1000 UG/ML
1000 INJECTION, SOLUTION INTRAMUSCULAR; SUBCUTANEOUS ONCE
Status: COMPLETED | OUTPATIENT
Start: 2025-06-17 | End: 2025-06-17

## 2025-06-17 RX ADMIN — CYANOCOBALAMIN 1000 MCG: 1000 INJECTION, SOLUTION INTRAMUSCULAR; SUBCUTANEOUS at 11:08

## 2025-06-23 ENCOUNTER — TREATMENT (OUTPATIENT)
Dept: OCCUPATIONAL THERAPY | Facility: HOSPITAL | Age: 80
End: 2025-06-23
Payer: MEDICARE

## 2025-06-23 DIAGNOSIS — S42.411A CLOSED FRACTURE OF SUPRACONDYLAR HUMERUS, RIGHT, INITIAL ENCOUNTER: Primary | ICD-10-CM

## 2025-06-23 PROCEDURE — 97110 THERAPEUTIC EXERCISES: CPT | Mod: GO | Performed by: OCCUPATIONAL THERAPIST

## 2025-06-23 PROCEDURE — 97140 MANUAL THERAPY 1/> REGIONS: CPT | Mod: GO | Performed by: OCCUPATIONAL THERAPIST

## 2025-06-23 NOTE — PROGRESS NOTES
Occupational Therapy  Occupational Therapy Orthopedic Progress Note    Patient Name: Gertrudis Cottrell  MRN: [id number]  Today's Date: 6/23/2025 1/9/25 - J - 2025 VERIFIED - MEDICARE A/B - NO AUTH / $257 DEDUCT MET / 80% COINS / $0 used PT/ST 2025. KEIKO MCKENZIE SUPP - PLAN G / PER RTE  Time:       Insurance:  Visit number: 19 since 2025   General:  Reason for visit: L elbow  Referred by: Hilda Patton PA-C  Time:  Time Calculation  Start Time: 0855  Stop Time: 0940  Time Calculation (min): 45 min  OT Therapeutic Procedures Time Entry  Therapeutic Exercise Time Entry: 10  Manual Therapy Time Entry: 30    Insurance Type: Payor: MEDICARE / Plan: MEDICARE PART A AND B / Product Type: *No Product type* /     Current Problem  1. Closed fracture of supracondylar humerus, right, initial encounter          Insurance Type: Payor: MEDICARE / Plan: MEDICARE PART A AND B / Product Type: *No Product type* /     Precautions: per post op protocol     Medical History Form: Reviewed (scanned into chart)    Subjective:  I still have trouble holding onto things.     Chief Complaint: L arm/hand  BASILIO: Fall   DOI: 05/12/2024  DOS: 05/30/2024 ORIF L elbow    Hand Dominance: Right    Current Condition since injury:   better      PAIN     Location: L elbow and hand  Intensity: 0/10   Description: sharp  Aggravating Factors:  functional use  Relieving Factors:  Rest     Relevant Information (PMH & Previous Tests/Imaging): reviewed in chart     Prior Level of Function (PLOF)  Exercise/Physical Activity: jigsaw puzzles  Work/School: retired  Current ADL/IADL Status: independent with homemaking     Patients Living Environment: Reviewed and no concern    Primary Language: English    Pt goals for therapy: improve functional use of L hand for cooking, cleaning, dressing, jigsaw puzzles, and various household chores    Red Flags: Do you have any of the following? No  Fever/chills, unexplained weight changes, dizziness/fainting, unexplained change in  bowel or bladder functions, unexplained malaise or muscle weakness, night pain/sweats, numbness or tingling    Objective:     Distance from tips to palm -; MCP/PIP/DIP flexion ; achieved 1 cm post treatment for all digits  2nd: 0 cm; 80 was 77  /100/75      3rd: 1.5 cm   ;  80  /100/70    4th: 1 cm  ;  80   /105/40   5th: 1.5 cm  ; 80    /102 /40   Thumb opposition to tip of 4th   Wrist extension/flexion 75 /62   Wrist ulnar/radial deviation 20/10  Forearm pronation/supination 70 /75    Elbow extension/flexion: 23 -120 ;  passive post treatment    strength: 45#/25# was 22#    Lateral pinch: 12#/4#   Physical Observation: significant limitations with elbow and digit range of motion, MCPs most limited, extensor habitus posture of hand    Edema: moderate    Sensory: no complaints of sensory issues     Good rehab potential    Outcome Measures:  Quick DASH: 22.5    EDUCATION: home exercise program, plan of care, activity modifications, pain management, and injury pathology     Goals:      Plan of care was developed with input and agreement by the patient.    Treatments:     Heating pad applied to circumferential hand.   Therapeutic Exercise:   10 min   HEP education and completion: MCP flexion stretch, IP flexion stretch, composite digit flexion stretch, prayer stretch, wrist flexion stretch, and forearm pronation stretch.  Added digit adduction with putty, digit abduction with rubber band, intrinsic plus with putty, MCP flexion stretch.       Manual Therapy    30 min  Therapist performed manual digit flexion and intrinsic stretches.  Therapist performed A/P joint mobilization combined with MCP flexion.  Therapist positioned MCPs in end range flexion with patient making active fist.  Therapist performed elbow distraction combined with extension stretch.      Assessment:      Progressed exercises today. Patient to continue working on digit/elbow motion and functional strengthening. Patient to follow up in two  weeks. Patient to benefit from continued therapy to progress functional strengthening.     Plan:   Patient to follow up in two weeks.       Felipe Lee, OT

## 2025-07-01 DIAGNOSIS — Z00.00 HEALTH CARE MAINTENANCE: ICD-10-CM

## 2025-07-04 RX ORDER — LOSARTAN POTASSIUM 25 MG/1
25 TABLET ORAL DAILY
Qty: 90 TABLET | Refills: 0 | Status: SHIPPED | OUTPATIENT
Start: 2025-07-04

## 2025-07-09 ENCOUNTER — TREATMENT (OUTPATIENT)
Dept: OCCUPATIONAL THERAPY | Facility: HOSPITAL | Age: 80
End: 2025-07-09
Payer: MEDICARE

## 2025-07-09 DIAGNOSIS — S42.411A CLOSED FRACTURE OF SUPRACONDYLAR HUMERUS, RIGHT, INITIAL ENCOUNTER: Primary | ICD-10-CM

## 2025-07-09 PROCEDURE — 97140 MANUAL THERAPY 1/> REGIONS: CPT | Mod: GO | Performed by: OCCUPATIONAL THERAPIST

## 2025-07-09 PROCEDURE — 97110 THERAPEUTIC EXERCISES: CPT | Mod: GO | Performed by: OCCUPATIONAL THERAPIST

## 2025-07-09 NOTE — PROGRESS NOTES
Occupational Therapy  Occupational Therapy Orthopedic Discharge Note    Patient Name: Gertrudis Cottrell  MRN: [id number]  Today's Date: 7/9/2025 1/9/25 - J - 2025 VERIFIED - MEDICARE A/B - NO AUTH / $257 DEDUCT MET / 80% COINS / $0 used PT/ST 2025. KEIKO MCKENZIE SUPP - PLAN G / PER RTE  Time:       Insurance:  Visit number: 20 since 2025   General:  Reason for visit: L elbow  Referred by: Hilda Patton PA-C  Time:       Insurance Type: Payor: MEDICARE / Plan: MEDICARE PART A AND B / Product Type: *No Product type* /     Current Problem  1. Closed fracture of supracondylar humerus, right, initial encounter            Insurance Type: Payor: MEDICARE / Plan: MEDICARE PART A AND B / Product Type: *No Product type* /     Precautions: per post op protocol     Medical History Form: Reviewed (scanned into chart)    Subjective:  I can do everything that I use to do. I am able to do everything in pilates.     Chief Complaint: L arm/hand  BASILIO: Fall   DOI: 05/12/2024  DOS: 05/30/2024 ORIF L elbow    Hand Dominance: Right    Current Condition since injury:   better      PAIN     Location: L elbow and hand  Intensity: 0/10   Description: sharp  Aggravating Factors:  functional use  Relieving Factors:  Rest     Relevant Information (PMH & Previous Tests/Imaging): reviewed in chart     Prior Level of Function (PLOF)  Exercise/Physical Activity: jigsaw puzzles  Work/School: retired  Current ADL/IADL Status: independent with homemaking     Patients Living Environment: Reviewed and no concern    Primary Language: English    Pt goals for therapy: improve functional use of L hand for cooking, cleaning, dressing, jigsaw puzzles, and various household chores    Red Flags: Do you have any of the following? No  Fever/chills, unexplained weight changes, dizziness/fainting, unexplained change in bowel or bladder functions, unexplained malaise or muscle weakness, night pain/sweats, numbness or tingling    Objective:     Distance from tips to palm  -; MCP/PIP/DIP flexion ; achieved 1 cm post treatment for all digits  2nd: 0 cm; 80 /100/75      3rd: 1.5 cm   ;  80  /100/70    4th: 1 cm  ;  80   /105/40   5th: 1.5 cm  ; 80    /102 /40   Thumb opposition to tip of 5th was 4th   Wrist extension/flexion 75 /62   Wrist ulnar/radial deviation 20/10  Forearm pronation/supination 70 /75    Elbow extension/flexion: 23 -120 ;  passive post treatment    strength: 45#/25#  Lateral pinch: 12#/4#   Physical Observation: significant limitations with elbow and digit range of motion, MCPs most limited, extensor habitus posture of hand    Edema: moderate    Sensory: no complaints of sensory issues     Good rehab potential    Outcome Measures:  Quick DASH: 22.5    EDUCATION: home exercise program, plan of care, activity modifications, pain management, and injury pathology     Goals:    Plan of care was developed with input and agreement by the patient.    Treatments:     Heating pad applied to circumferential hand.   Therapeutic Exercise:   10 min   HEP education and completion: MCP flexion stretch, IP flexion stretch, composite digit flexion stretch, prayer stretch, wrist flexion stretch, and forearm pronation stretch.  Added digit adduction with putty, digit abduction with rubber band, intrinsic plus with putty, MCP flexion stretch.     Manual Therapy    30 min  Therapist performed manual digit flexion and intrinsic stretches.  Therapist performed A/P joint mobilization combined with MCP flexion.  Therapist positioned MCPs in end range flexion with patient making active fist.  Therapist performed elbow distraction combined with extension stretch.    Assessment:    Post treatment elbow flexion up to 130 today. Patient to continue working on end range elbow and digit motion. Patient doing very well overall with functional use of L hand/elbow. Patient discharged to home program.       Plan:   Patient discharged to home program.       Felipe Lee, OT

## 2025-07-10 ENCOUNTER — APPOINTMENT (OUTPATIENT)
Dept: RADIOLOGY | Facility: CLINIC | Age: 80
End: 2025-07-10
Payer: COMMERCIAL

## 2025-07-10 DIAGNOSIS — Z13.820 SCREENING FOR OSTEOPOROSIS: ICD-10-CM

## 2025-07-10 DIAGNOSIS — Z78.0 ASYMPTOMATIC POSTMENOPAUSAL STATE: ICD-10-CM

## 2025-07-10 PROCEDURE — 77080 DXA BONE DENSITY AXIAL: CPT | Performed by: RADIOLOGY

## 2025-07-10 PROCEDURE — 77080 DXA BONE DENSITY AXIAL: CPT

## 2025-07-11 ENCOUNTER — APPOINTMENT (OUTPATIENT)
Dept: PRIMARY CARE | Facility: CLINIC | Age: 80
End: 2025-07-11
Payer: COMMERCIAL

## 2025-07-11 DIAGNOSIS — E53.8 B12 DEFICIENCY: Primary | ICD-10-CM

## 2025-07-11 RX ORDER — CYANOCOBALAMIN 1000 UG/ML
1000 INJECTION, SOLUTION INTRAMUSCULAR; SUBCUTANEOUS ONCE
Status: COMPLETED | OUTPATIENT
Start: 2025-07-11 | End: 2025-07-11

## 2025-07-11 RX ADMIN — CYANOCOBALAMIN 1000 MCG: 1000 INJECTION, SOLUTION INTRAMUSCULAR; SUBCUTANEOUS at 10:05

## 2025-08-08 ENCOUNTER — APPOINTMENT (OUTPATIENT)
Dept: PRIMARY CARE | Facility: CLINIC | Age: 80
End: 2025-08-08
Payer: COMMERCIAL

## 2025-08-08 ENCOUNTER — OFFICE VISIT (OUTPATIENT)
Dept: PRIMARY CARE | Facility: CLINIC | Age: 80
End: 2025-08-08
Payer: MEDICARE

## 2025-08-08 DIAGNOSIS — E53.8 B12 DEFICIENCY: Primary | ICD-10-CM

## 2025-08-08 DIAGNOSIS — Z00.00 HEALTH CARE MAINTENANCE: ICD-10-CM

## 2025-08-08 RX ADMIN — CYANOCOBALAMIN 1000 MCG: 1000 INJECTION, SOLUTION INTRAMUSCULAR; SUBCUTANEOUS at 01:40

## 2025-08-09 RX ORDER — CYANOCOBALAMIN 1000 UG/ML
1000 INJECTION, SOLUTION INTRAMUSCULAR; SUBCUTANEOUS ONCE
Status: COMPLETED | OUTPATIENT
Start: 2025-08-09 | End: 2025-08-08

## 2025-08-19 ENCOUNTER — APPOINTMENT (OUTPATIENT)
Dept: PRIMARY CARE | Facility: CLINIC | Age: 80
End: 2025-08-19
Payer: COMMERCIAL

## 2025-08-21 DIAGNOSIS — M81.0 AGE RELATED OSTEOPOROSIS, UNSPECIFIED PATHOLOGICAL FRACTURE PRESENCE: Primary | ICD-10-CM

## 2025-08-31 ASSESSMENT — ACTIVITIES OF DAILY LIVING (ADL)
MANAGING_FINANCES: INDEPENDENT
TAKING_MEDICATION: INDEPENDENT
GROCERY_SHOPPING: INDEPENDENT
DRESSING: INDEPENDENT
DOING_HOUSEWORK: INDEPENDENT
BATHING: INDEPENDENT

## 2025-08-31 ASSESSMENT — ENCOUNTER SYMPTOMS
OCCASIONAL FEELINGS OF UNSTEADINESS: 0
LOSS OF SENSATION IN FEET: 0
DEPRESSION: 0

## 2025-08-31 ASSESSMENT — PATIENT HEALTH QUESTIONNAIRE - PHQ9
1. LITTLE INTEREST OR PLEASURE IN DOING THINGS: NOT AT ALL
2. FEELING DOWN, DEPRESSED OR HOPELESS: NOT AT ALL
SUM OF ALL RESPONSES TO PHQ9 QUESTIONS 1 AND 2: 0

## 2025-09-05 ENCOUNTER — APPOINTMENT (OUTPATIENT)
Dept: PRIMARY CARE | Facility: CLINIC | Age: 80
End: 2025-09-05
Payer: COMMERCIAL

## 2025-09-12 ENCOUNTER — APPOINTMENT (OUTPATIENT)
Dept: ORTHOPEDIC SURGERY | Facility: CLINIC | Age: 80
End: 2025-09-12
Payer: COMMERCIAL

## 2025-10-03 ENCOUNTER — APPOINTMENT (OUTPATIENT)
Dept: PRIMARY CARE | Facility: CLINIC | Age: 80
End: 2025-10-03
Payer: COMMERCIAL

## 2025-11-07 ENCOUNTER — APPOINTMENT (OUTPATIENT)
Dept: RHEUMATOLOGY | Facility: CLINIC | Age: 80
End: 2025-11-07
Payer: COMMERCIAL

## 2025-11-10 ENCOUNTER — APPOINTMENT (OUTPATIENT)
Dept: RHEUMATOLOGY | Facility: CLINIC | Age: 80
End: 2025-11-10
Payer: COMMERCIAL

## 2025-12-18 ENCOUNTER — APPOINTMENT (OUTPATIENT)
Dept: DERMATOLOGY | Facility: CLINIC | Age: 80
End: 2025-12-18
Payer: COMMERCIAL

## 2026-01-27 ENCOUNTER — APPOINTMENT (OUTPATIENT)
Dept: RHEUMATOLOGY | Facility: CLINIC | Age: 81
End: 2026-01-27
Payer: COMMERCIAL

## (undated) DEVICE — DRILL BIT, 2.5MM, QC, GOLD, 110MM, STERILE

## (undated) DEVICE — DRAPE, SHEET, U, W/ADHESIVE STRIP, IMPERVIOUS, 60 X 70 IN, DISPOSABLE, LF, STERILE

## (undated) DEVICE — ELECTRODE, ELECTROSURGICAL, BLADE, STANDARD, 2.75 IN

## (undated) DEVICE — DRAPE, INCISE, ANTIMICROBIAL, IOBAN 2, LARGE, 17 X 23 IN, DISPOSABLE, STERILE

## (undated) DEVICE — PROTECTOR, NERVE, ULNAR, PINK

## (undated) DEVICE — COVER, CART, 45 X 27 X 48 IN, CLEAR

## (undated) DEVICE — SUTURE, VICRYL, 1, 27 IN, CT-1, VIOLET

## (undated) DEVICE — SUTURE, VICRYL, 2-0, 27 IN, FS-1, UNDYED

## (undated) DEVICE — BANDAGE, ELASTIC, MATRIX, SELF-CLOSURE, 6 IN X 5 YD, LF

## (undated) DEVICE — DRAPE, SHEET, THREE QUARTER, FAN FOLD, 57 X 77 IN

## (undated) DEVICE — Device

## (undated) DEVICE — IRRIGATION SET, Y, LARGE BORE

## (undated) DEVICE — BANDAGE, ELASTIC, MATRIX, SELF-CLOSURE, 4 IN X 5 YD, LF

## (undated) DEVICE — DRILL BIT, 2.0MM, QC, 140MM, W/DEPTH MARK

## (undated) DEVICE — TIP, SUCTION, FRAZIER, W/CONTROL VENT, 12 FR

## (undated) DEVICE — COVER, C-ARM W/CLIPS, OEC GE

## (undated) DEVICE — COVER, BACK TABLE, 65 X 90, HVY REINFORCED

## (undated) DEVICE — DRAPE, IRRIGATION, W/POUCH, ADHESIVE STRIP, STERI DRAPE, 19 X 23 IN, DISPOSABLE, STERILE

## (undated) DEVICE — STAPLER, SKIN PROXIMATE, 35 WIDE

## (undated) DEVICE — APPLICATOR, CHLORAPREP, W/ORANGE TINT, 26ML

## (undated) DEVICE — SUTURE, PROLENE, 2-0, 18 IN, FS, BLUE

## (undated) DEVICE — PADDING, WEBRIL, UNDERCAST, STERILE, 3 IN

## (undated) DEVICE — BANDAGE, COFLEX, 4 X 5 YDS, TAN, STERILE, LF

## (undated) DEVICE — APPLICATOR, PREP, CHLORAPREP, W/ORANGE TINT, 10.5ML

## (undated) DEVICE — BANDAGE, GAUZE, CONFORMING, KERLIX, 6 PLY, 4.5 IN X 4.1 YD